# Patient Record
Sex: MALE | Race: WHITE | NOT HISPANIC OR LATINO | URBAN - METROPOLITAN AREA
[De-identification: names, ages, dates, MRNs, and addresses within clinical notes are randomized per-mention and may not be internally consistent; named-entity substitution may affect disease eponyms.]

---

## 2017-05-04 PROBLEM — Z00.00 ENCOUNTER FOR PREVENTIVE HEALTH EXAMINATION: Status: ACTIVE | Noted: 2017-05-04

## 2017-05-14 ENCOUNTER — EMERGENCY (EMERGENCY)
Facility: HOSPITAL | Age: 77
LOS: 0 days | Discharge: HOME | End: 2017-05-14

## 2017-06-28 ENCOUNTER — INPATIENT (INPATIENT)
Facility: HOSPITAL | Age: 77
LOS: 1 days | Discharge: HOME | End: 2017-06-30

## 2017-06-28 DIAGNOSIS — Z98.890 OTHER SPECIFIED POSTPROCEDURAL STATES: ICD-10-CM

## 2017-06-28 DIAGNOSIS — R47.81 SLURRED SPEECH: ICD-10-CM

## 2017-06-28 DIAGNOSIS — R04.0 EPISTAXIS: ICD-10-CM

## 2017-06-28 DIAGNOSIS — Z79.02 LONG TERM (CURRENT) USE OF ANTITHROMBOTICS/ANTIPLATELETS: ICD-10-CM

## 2017-06-28 DIAGNOSIS — I25.10 ATHEROSCLEROTIC HEART DISEASE OF NATIVE CORONARY ARTERY WITHOUT ANGINA PECTORIS: ICD-10-CM

## 2017-06-28 DIAGNOSIS — Z79.82 LONG TERM (CURRENT) USE OF ASPIRIN: ICD-10-CM

## 2017-06-28 DIAGNOSIS — I10 ESSENTIAL (PRIMARY) HYPERTENSION: ICD-10-CM

## 2017-07-10 DIAGNOSIS — N40.0 BENIGN PROSTATIC HYPERPLASIA WITHOUT LOWER URINARY TRACT SYMPTOMS: ICD-10-CM

## 2017-07-10 DIAGNOSIS — I25.10 ATHEROSCLEROTIC HEART DISEASE OF NATIVE CORONARY ARTERY WITHOUT ANGINA PECTORIS: ICD-10-CM

## 2017-07-10 DIAGNOSIS — B19.20 UNSPECIFIED VIRAL HEPATITIS C WITHOUT HEPATIC COMA: ICD-10-CM

## 2017-07-10 DIAGNOSIS — F32.9 MAJOR DEPRESSIVE DISORDER, SINGLE EPISODE, UNSPECIFIED: ICD-10-CM

## 2017-07-10 DIAGNOSIS — I10 ESSENTIAL (PRIMARY) HYPERTENSION: ICD-10-CM

## 2017-07-10 DIAGNOSIS — G45.9 TRANSIENT CEREBRAL ISCHEMIC ATTACK, UNSPECIFIED: ICD-10-CM

## 2017-07-10 DIAGNOSIS — Z87.820 PERSONAL HISTORY OF TRAUMATIC BRAIN INJURY: ICD-10-CM

## 2017-07-10 DIAGNOSIS — I63.9 CEREBRAL INFARCTION, UNSPECIFIED: ICD-10-CM

## 2017-07-11 DIAGNOSIS — R29.704 NIHSS SCORE 4: ICD-10-CM

## 2017-08-11 ENCOUNTER — OUTPATIENT (OUTPATIENT)
Dept: OUTPATIENT SERVICES | Facility: HOSPITAL | Age: 77
LOS: 1 days | Discharge: HOME | End: 2017-08-11

## 2017-08-11 DIAGNOSIS — R47.81 SLURRED SPEECH: ICD-10-CM

## 2017-08-17 ENCOUNTER — INPATIENT (INPATIENT)
Facility: HOSPITAL | Age: 77
LOS: 5 days | Discharge: HOME | End: 2017-08-23
Attending: INTERNAL MEDICINE

## 2017-08-17 DIAGNOSIS — K29.40 CHRONIC ATROPHIC GASTRITIS WITHOUT BLEEDING: ICD-10-CM

## 2017-08-17 DIAGNOSIS — Z86.73 PERSONAL HISTORY OF TRANSIENT ISCHEMIC ATTACK (TIA), AND CEREBRAL INFARCTION WITHOUT RESIDUAL DEFICITS: ICD-10-CM

## 2017-08-17 DIAGNOSIS — R47.81 SLURRED SPEECH: ICD-10-CM

## 2017-08-17 DIAGNOSIS — E78.00 PURE HYPERCHOLESTEROLEMIA, UNSPECIFIED: ICD-10-CM

## 2017-08-17 DIAGNOSIS — R13.10 DYSPHAGIA, UNSPECIFIED: ICD-10-CM

## 2017-08-25 DIAGNOSIS — E78.5 HYPERLIPIDEMIA, UNSPECIFIED: ICD-10-CM

## 2017-08-25 DIAGNOSIS — N40.0 BENIGN PROSTATIC HYPERPLASIA WITHOUT LOWER URINARY TRACT SYMPTOMS: ICD-10-CM

## 2017-08-25 DIAGNOSIS — R55 SYNCOPE AND COLLAPSE: ICD-10-CM

## 2017-08-25 DIAGNOSIS — Z86.19 PERSONAL HISTORY OF OTHER INFECTIOUS AND PARASITIC DISEASES: ICD-10-CM

## 2017-08-25 DIAGNOSIS — I25.10 ATHEROSCLEROTIC HEART DISEASE OF NATIVE CORONARY ARTERY WITHOUT ANGINA PECTORIS: ICD-10-CM

## 2017-08-25 DIAGNOSIS — I10 ESSENTIAL (PRIMARY) HYPERTENSION: ICD-10-CM

## 2017-08-25 DIAGNOSIS — Z87.891 PERSONAL HISTORY OF NICOTINE DEPENDENCE: ICD-10-CM

## 2017-08-25 DIAGNOSIS — G62.9 POLYNEUROPATHY, UNSPECIFIED: ICD-10-CM

## 2017-08-25 DIAGNOSIS — Z85.828 PERSONAL HISTORY OF OTHER MALIGNANT NEOPLASM OF SKIN: ICD-10-CM

## 2017-08-25 DIAGNOSIS — D50.9 IRON DEFICIENCY ANEMIA, UNSPECIFIED: ICD-10-CM

## 2017-08-25 DIAGNOSIS — Z86.73 PERSONAL HISTORY OF TRANSIENT ISCHEMIC ATTACK (TIA), AND CEREBRAL INFARCTION WITHOUT RESIDUAL DEFICITS: ICD-10-CM

## 2017-08-25 DIAGNOSIS — R63.4 ABNORMAL WEIGHT LOSS: ICD-10-CM

## 2017-08-25 DIAGNOSIS — K57.30 DIVERTICULOSIS OF LARGE INTESTINE WITHOUT PERFORATION OR ABSCESS WITHOUT BLEEDING: ICD-10-CM

## 2017-09-12 ENCOUNTER — OUTPATIENT (OUTPATIENT)
Dept: OUTPATIENT SERVICES | Facility: HOSPITAL | Age: 77
LOS: 1 days | Discharge: HOME | End: 2017-09-12

## 2017-09-12 DIAGNOSIS — R47.81 SLURRED SPEECH: ICD-10-CM

## 2017-09-18 ENCOUNTER — OUTPATIENT (OUTPATIENT)
Dept: OUTPATIENT SERVICES | Facility: HOSPITAL | Age: 77
LOS: 1 days | Discharge: HOME | End: 2017-09-18

## 2017-09-18 DIAGNOSIS — R47.81 SLURRED SPEECH: ICD-10-CM

## 2017-09-20 DIAGNOSIS — D64.9 ANEMIA, UNSPECIFIED: ICD-10-CM

## 2017-09-20 DIAGNOSIS — R19.5 OTHER FECAL ABNORMALITIES: ICD-10-CM

## 2017-09-25 DIAGNOSIS — E78.5 HYPERLIPIDEMIA, UNSPECIFIED: ICD-10-CM

## 2017-09-25 DIAGNOSIS — I10 ESSENTIAL (PRIMARY) HYPERTENSION: ICD-10-CM

## 2017-09-25 DIAGNOSIS — R55 SYNCOPE AND COLLAPSE: ICD-10-CM

## 2017-09-25 DIAGNOSIS — I25.10 ATHEROSCLEROTIC HEART DISEASE OF NATIVE CORONARY ARTERY WITHOUT ANGINA PECTORIS: ICD-10-CM

## 2017-10-12 ENCOUNTER — APPOINTMENT (OUTPATIENT)
Dept: CARDIOLOGY | Facility: CLINIC | Age: 77
End: 2017-10-12

## 2017-10-12 VITALS — BODY MASS INDEX: 25.76 KG/M2 | HEIGHT: 71 IN | WEIGHT: 184 LBS

## 2017-10-12 VITALS — DIASTOLIC BLOOD PRESSURE: 62 MMHG | SYSTOLIC BLOOD PRESSURE: 104 MMHG

## 2017-10-12 DIAGNOSIS — Z86.39 PERSONAL HISTORY OF OTHER ENDOCRINE, NUTRITIONAL AND METABOLIC DISEASE: ICD-10-CM

## 2017-10-12 DIAGNOSIS — Z86.19 PERSONAL HISTORY OF OTHER INFECTIOUS AND PARASITIC DISEASES: ICD-10-CM

## 2018-06-18 ENCOUNTER — MOBILE ON CALL (OUTPATIENT)
Age: 78
End: 2018-06-18

## 2018-08-09 RX ORDER — FLUDROCORTISONE ACETATE 0.1 MG/1
0.1 TABLET ORAL DAILY
Refills: 0 | Status: ACTIVE | COMMUNITY

## 2018-08-09 RX ORDER — CLOPIDOGREL 75 MG/1
75 TABLET, FILM COATED ORAL
Refills: 0 | Status: DISCONTINUED | COMMUNITY

## 2018-09-17 ENCOUNTER — OUTPATIENT (OUTPATIENT)
Dept: OUTPATIENT SERVICES | Facility: HOSPITAL | Age: 78
LOS: 1 days | Discharge: HOME | End: 2018-09-17

## 2018-09-17 VITALS
SYSTOLIC BLOOD PRESSURE: 148 MMHG | OXYGEN SATURATION: 95 % | WEIGHT: 184.31 LBS | HEIGHT: 71 IN | TEMPERATURE: 98 F | HEART RATE: 69 BPM | DIASTOLIC BLOOD PRESSURE: 81 MMHG | RESPIRATION RATE: 18 BRPM

## 2018-09-17 NOTE — H&P CARDIOLOGY - FAMILY HISTORY
Mother  Still living? Unknown  Family history of breast cancer in female, Age at diagnosis: Age Unknown     Father  Still living? No  Family history of throat cancer, Age at diagnosis: Age Unknown

## 2018-09-17 NOTE — H&P CARDIOLOGY - PMH
Benign prostate hyperplasia    Depression    Gastroesophageal reflux disease, esophagitis presence not specified    Hepatitis C virus infection cured after antiviral drug therapy    Hypercholesteremia    Other depression    Skin cancer  left ear  Spleen injury

## 2018-09-17 NOTE — H&P CARDIOLOGY - HISTORY OF PRESENT ILLNESS
HPI   78 Yr old male with PMH of GERD, BPH, HEPATITIS C, SPLENECTOMY, LEFT EAR CA, DEPRESSION, HLD  presents for removal of loop recorder.  Pt states loop recorder was inserted one year ago for episodes dizziness with periods of near syncope.  Pt admits to diaphoresis with these events. Pt denied any cp or sob and no n/v with these events.  Reports since being placed on loop recorder was found to have orthostatic hypotension with improved symptoms since he now compensates by rising slowly with position change.

## 2018-09-17 NOTE — CHART NOTE - NSCHARTNOTEFT_GEN_A_CORE
Cardiac Electrophysiology Procedure Note    Procedure:  [employer]  Implantable Loop Recorder Explant    Indication: Palpitations  Attending:	Jl Santacruz MD    EQUIPMENT EXPLANTED      [employer] Linq  Serial Number  [id number]        DESCRIPTION OF PROCEDURE  The patient was brought to the Procedure Room in a nonsedated and fasting state, having received preoperative antibiotics. Informed, written consent was obtained prior to the procedure.  The left shoulder region was cleaned and prepped with serial applications of Chlorhexidine. Patient was then covered with sterile drapes in the usual manner. Blood pressure, oxygenation and level of comfort were stable throughout.   	The left deltopectoral groove region was defined; 10 cc of lidocaine solution were infiltrated into the skin overlying the left deltopectoral groove. A 5 mm. incision was then made. The loop recorder was removed from under the skin..     The wound margins approximated well, without any tension or overlap. The wound was closed using dermabond  A dry, sterile dressing was placed over this.     COMPLICATIONS:  The patient tolerated the procedure well. There were no immediate complications.    CONCLUSIONS:  Successful explant of loop recorder.

## 2018-09-17 NOTE — PROGRESS NOTE ADULT - SUBJECTIVE AND OBJECTIVE BOX
Electrophysiology Brief Post-Op Note    I have personally seen and examined the patient.  I agree with the history and physical which I have reviewed and noted any changes below.  09-17-18 @ 13:08    PRE-OP DIAGNOSIS: palpitations    POST-OP DIAGNOSIS: palpitations    PROCEDURE: loop explant    Physician: Noam  Assistant: None    ESTIMATED BLOOD LOSS: 1      mL    ANESTHESIA TYPE:  [  ]General Anesthesia  [  ] Sedation  [ X ] Local/Regional    CONDITION  [  ] Critical  [  ] Serious  [  ]Fair  [ X} good      SPECIMENS REMOVED (IF APPLICABLE):  loop    IMPLANTS (IF APPLICABLE)  none    FINDINGS  PLAN OF CARE  FU 3-4 weeks

## 2018-09-20 DIAGNOSIS — F32.9 MAJOR DEPRESSIVE DISORDER, SINGLE EPISODE, UNSPECIFIED: ICD-10-CM

## 2018-09-20 DIAGNOSIS — Z45.09 ENCOUNTER FOR ADJUSTMENT AND MANAGEMENT OF OTHER CARDIAC DEVICE: ICD-10-CM

## 2018-09-20 DIAGNOSIS — Z87.891 PERSONAL HISTORY OF NICOTINE DEPENDENCE: ICD-10-CM

## 2018-09-20 DIAGNOSIS — E78.4 OTHER HYPERLIPIDEMIA: ICD-10-CM

## 2018-09-20 DIAGNOSIS — B19.20 UNSPECIFIED VIRAL HEPATITIS C WITHOUT HEPATIC COMA: ICD-10-CM

## 2019-06-18 ENCOUNTER — INPATIENT (INPATIENT)
Facility: HOSPITAL | Age: 79
LOS: 1 days | Discharge: HOME | End: 2019-06-20
Attending: INTERNAL MEDICINE | Admitting: INTERNAL MEDICINE
Payer: MEDICARE

## 2019-06-18 VITALS
HEART RATE: 70 BPM | SYSTOLIC BLOOD PRESSURE: 186 MMHG | RESPIRATION RATE: 18 BRPM | OXYGEN SATURATION: 97 % | DIASTOLIC BLOOD PRESSURE: 94 MMHG

## 2019-06-18 PROBLEM — Z86.19 PERSONAL HISTORY OF OTHER INFECTIOUS AND PARASITIC DISEASES: Chronic | Status: ACTIVE | Noted: 2018-09-17

## 2019-06-18 PROBLEM — E78.00 PURE HYPERCHOLESTEROLEMIA, UNSPECIFIED: Chronic | Status: ACTIVE | Noted: 2018-09-17

## 2019-06-18 PROBLEM — K21.9 GASTRO-ESOPHAGEAL REFLUX DISEASE WITHOUT ESOPHAGITIS: Chronic | Status: ACTIVE | Noted: 2018-09-17

## 2019-06-18 PROBLEM — F32.89 OTHER SPECIFIED DEPRESSIVE EPISODES: Chronic | Status: ACTIVE | Noted: 2018-09-17

## 2019-06-18 PROBLEM — N40.0 BENIGN PROSTATIC HYPERPLASIA WITHOUT LOWER URINARY TRACT SYMPTOMS: Chronic | Status: ACTIVE | Noted: 2018-09-17

## 2019-06-18 PROBLEM — C44.90 UNSPECIFIED MALIGNANT NEOPLASM OF SKIN, UNSPECIFIED: Chronic | Status: ACTIVE | Noted: 2018-09-17

## 2019-06-18 PROBLEM — S36.00XA: Chronic | Status: ACTIVE | Noted: 2018-09-17

## 2019-06-18 PROBLEM — F32.9 MAJOR DEPRESSIVE DISORDER, SINGLE EPISODE, UNSPECIFIED: Chronic | Status: ACTIVE | Noted: 2018-09-17

## 2019-06-18 LAB
ALBUMIN SERPL ELPH-MCNC: 3.8 G/DL — SIGNIFICANT CHANGE UP (ref 3.5–5.2)
ALP SERPL-CCNC: 96 U/L — SIGNIFICANT CHANGE UP (ref 30–115)
ALT FLD-CCNC: 15 U/L — SIGNIFICANT CHANGE UP (ref 0–41)
ANION GAP SERPL CALC-SCNC: 7 MMOL/L — SIGNIFICANT CHANGE UP (ref 7–14)
APTT BLD: 38.1 SEC — SIGNIFICANT CHANGE UP (ref 27–39.2)
AST SERPL-CCNC: 21 U/L — SIGNIFICANT CHANGE UP (ref 0–41)
BASOPHILS # BLD AUTO: 0.05 K/UL — SIGNIFICANT CHANGE UP (ref 0–0.2)
BASOPHILS NFR BLD AUTO: 0.7 % — SIGNIFICANT CHANGE UP (ref 0–1)
BILIRUB SERPL-MCNC: 0.8 MG/DL — SIGNIFICANT CHANGE UP (ref 0.2–1.2)
BUN SERPL-MCNC: 11 MG/DL — SIGNIFICANT CHANGE UP (ref 10–20)
CALCIUM SERPL-MCNC: 8.9 MG/DL — SIGNIFICANT CHANGE UP (ref 8.5–10.1)
CHLORIDE SERPL-SCNC: 102 MMOL/L — SIGNIFICANT CHANGE UP (ref 98–110)
CK MB CFR SERPL CALC: 2.5 NG/ML — SIGNIFICANT CHANGE UP (ref 0.6–6.3)
CK MB CFR SERPL CALC: 2.7 NG/ML — SIGNIFICANT CHANGE UP (ref 0.6–6.3)
CK SERPL-CCNC: 92 U/L — SIGNIFICANT CHANGE UP (ref 0–225)
CO2 SERPL-SCNC: 32 MMOL/L — SIGNIFICANT CHANGE UP (ref 17–32)
CREAT SERPL-MCNC: 0.8 MG/DL — SIGNIFICANT CHANGE UP (ref 0.7–1.5)
EOSINOPHIL # BLD AUTO: 0.19 K/UL — SIGNIFICANT CHANGE UP (ref 0–0.7)
EOSINOPHIL NFR BLD AUTO: 2.7 % — SIGNIFICANT CHANGE UP (ref 0–8)
GLUCOSE BLDC GLUCOMTR-MCNC: 109 MG/DL — HIGH (ref 70–99)
GLUCOSE BLDC GLUCOMTR-MCNC: 111 MG/DL — HIGH (ref 70–99)
GLUCOSE SERPL-MCNC: 94 MG/DL — SIGNIFICANT CHANGE UP (ref 70–99)
HCT VFR BLD CALC: 42.9 % — SIGNIFICANT CHANGE UP (ref 42–52)
HGB BLD-MCNC: 14.2 G/DL — SIGNIFICANT CHANGE UP (ref 14–18)
IMM GRANULOCYTES NFR BLD AUTO: 0.3 % — SIGNIFICANT CHANGE UP (ref 0.1–0.3)
INR BLD: 1.14 RATIO — SIGNIFICANT CHANGE UP (ref 0.65–1.3)
LYMPHOCYTES # BLD AUTO: 2.41 K/UL — SIGNIFICANT CHANGE UP (ref 1.2–3.4)
LYMPHOCYTES # BLD AUTO: 34.5 % — SIGNIFICANT CHANGE UP (ref 20.5–51.1)
MCHC RBC-ENTMCNC: 32 PG — HIGH (ref 27–31)
MCHC RBC-ENTMCNC: 33.1 G/DL — SIGNIFICANT CHANGE UP (ref 32–37)
MCV RBC AUTO: 96.6 FL — HIGH (ref 80–94)
MONOCYTES # BLD AUTO: 0.79 K/UL — HIGH (ref 0.1–0.6)
MONOCYTES NFR BLD AUTO: 11.3 % — HIGH (ref 1.7–9.3)
NEUTROPHILS # BLD AUTO: 3.52 K/UL — SIGNIFICANT CHANGE UP (ref 1.4–6.5)
NEUTROPHILS NFR BLD AUTO: 50.5 % — SIGNIFICANT CHANGE UP (ref 42.2–75.2)
NRBC # BLD: 0 /100 WBCS — SIGNIFICANT CHANGE UP (ref 0–0)
PLATELET # BLD AUTO: 209 K/UL — SIGNIFICANT CHANGE UP (ref 130–400)
POTASSIUM SERPL-MCNC: 3.3 MMOL/L — LOW (ref 3.5–5)
POTASSIUM SERPL-SCNC: 3.3 MMOL/L — LOW (ref 3.5–5)
PROT SERPL-MCNC: 6.8 G/DL — SIGNIFICANT CHANGE UP (ref 6–8)
PROTHROM AB SERPL-ACNC: 13.1 SEC — HIGH (ref 9.95–12.87)
RBC # BLD: 4.44 M/UL — LOW (ref 4.7–6.1)
RBC # FLD: 14.2 % — SIGNIFICANT CHANGE UP (ref 11.5–14.5)
SODIUM SERPL-SCNC: 141 MMOL/L — SIGNIFICANT CHANGE UP (ref 135–146)
TROPONIN T SERPL-MCNC: <0.01 NG/ML — SIGNIFICANT CHANGE UP
TROPONIN T SERPL-MCNC: <0.01 NG/ML — SIGNIFICANT CHANGE UP
WBC # BLD: 6.98 K/UL — SIGNIFICANT CHANGE UP (ref 4.8–10.8)
WBC # FLD AUTO: 6.98 K/UL — SIGNIFICANT CHANGE UP (ref 4.8–10.8)

## 2019-06-18 PROCEDURE — 93010 ELECTROCARDIOGRAM REPORT: CPT

## 2019-06-18 PROCEDURE — 70450 CT HEAD/BRAIN W/O DYE: CPT | Mod: 26

## 2019-06-18 PROCEDURE — 0042T: CPT

## 2019-06-18 PROCEDURE — 93010 ELECTROCARDIOGRAM REPORT: CPT | Mod: 77

## 2019-06-18 PROCEDURE — 71045 X-RAY EXAM CHEST 1 VIEW: CPT | Mod: 26

## 2019-06-18 PROCEDURE — 99221 1ST HOSP IP/OBS SF/LOW 40: CPT

## 2019-06-18 PROCEDURE — 99291 CRITICAL CARE FIRST HOUR: CPT | Mod: GC

## 2019-06-18 RX ORDER — PREGABALIN 225 MG/1
1000 CAPSULE ORAL DAILY
Refills: 0 | Status: DISCONTINUED | OUTPATIENT
Start: 2019-06-18 | End: 2019-06-20

## 2019-06-18 RX ORDER — FERROUS SULFATE 325(65) MG
325 TABLET ORAL DAILY
Refills: 0 | Status: DISCONTINUED | OUTPATIENT
Start: 2019-06-18 | End: 2019-06-20

## 2019-06-18 RX ORDER — PANTOPRAZOLE SODIUM 20 MG/1
1 TABLET, DELAYED RELEASE ORAL
Qty: 0 | Refills: 0 | DISCHARGE

## 2019-06-18 RX ORDER — PREGABALIN 225 MG/1
1 CAPSULE ORAL
Qty: 0 | Refills: 0 | DISCHARGE

## 2019-06-18 RX ORDER — TAMSULOSIN HYDROCHLORIDE 0.4 MG/1
0.4 CAPSULE ORAL AT BEDTIME
Refills: 0 | Status: DISCONTINUED | OUTPATIENT
Start: 2019-06-18 | End: 2019-06-20

## 2019-06-18 RX ORDER — CHLORHEXIDINE GLUCONATE 213 G/1000ML
1 SOLUTION TOPICAL ONCE
Refills: 0 | Status: COMPLETED | OUTPATIENT
Start: 2019-06-18 | End: 2019-06-18

## 2019-06-18 RX ORDER — ATORVASTATIN CALCIUM 80 MG/1
80 TABLET, FILM COATED ORAL AT BEDTIME
Refills: 0 | Status: DISCONTINUED | OUTPATIENT
Start: 2019-06-18 | End: 2019-06-20

## 2019-06-18 RX ORDER — ASPIRIN/CALCIUM CARB/MAGNESIUM 324 MG
81 TABLET ORAL DAILY
Refills: 0 | Status: DISCONTINUED | OUTPATIENT
Start: 2019-06-18 | End: 2019-06-20

## 2019-06-18 RX ORDER — FLUDROCORTISONE ACETATE 0.1 MG/1
0.1 TABLET ORAL DAILY
Refills: 0 | Status: DISCONTINUED | OUTPATIENT
Start: 2019-06-18 | End: 2019-06-20

## 2019-06-18 RX ORDER — ASPIRIN/CALCIUM CARB/MAGNESIUM 324 MG
325 TABLET ORAL ONCE
Refills: 0 | Status: COMPLETED | OUTPATIENT
Start: 2019-06-18 | End: 2019-06-18

## 2019-06-18 RX ORDER — CHLORHEXIDINE GLUCONATE 213 G/1000ML
1 SOLUTION TOPICAL
Refills: 0 | Status: DISCONTINUED | OUTPATIENT
Start: 2019-06-18 | End: 2019-06-20

## 2019-06-18 RX ORDER — PANTOPRAZOLE SODIUM 20 MG/1
40 TABLET, DELAYED RELEASE ORAL
Refills: 0 | Status: DISCONTINUED | OUTPATIENT
Start: 2019-06-18 | End: 2019-06-20

## 2019-06-18 RX ORDER — ENOXAPARIN SODIUM 100 MG/ML
40 INJECTION SUBCUTANEOUS DAILY
Refills: 0 | Status: DISCONTINUED | OUTPATIENT
Start: 2019-06-18 | End: 2019-06-20

## 2019-06-18 RX ORDER — ESCITALOPRAM OXALATE 10 MG/1
20 TABLET, FILM COATED ORAL DAILY
Refills: 0 | Status: DISCONTINUED | OUTPATIENT
Start: 2019-06-18 | End: 2019-06-20

## 2019-06-18 RX ORDER — ATORVASTATIN CALCIUM 80 MG/1
1 TABLET, FILM COATED ORAL
Qty: 0 | Refills: 0 | DISCHARGE

## 2019-06-18 RX ORDER — POTASSIUM CHLORIDE 20 MEQ
40 PACKET (EA) ORAL ONCE
Refills: 0 | Status: COMPLETED | OUTPATIENT
Start: 2019-06-18 | End: 2019-06-18

## 2019-06-18 RX ORDER — TAMSULOSIN HYDROCHLORIDE 0.4 MG/1
1 CAPSULE ORAL
Qty: 0 | Refills: 0 | DISCHARGE

## 2019-06-18 RX ADMIN — TAMSULOSIN HYDROCHLORIDE 0.4 MILLIGRAM(S): 0.4 CAPSULE ORAL at 22:03

## 2019-06-18 RX ADMIN — Medication 325 MILLIGRAM(S): at 16:27

## 2019-06-18 RX ADMIN — Medication 40 MILLIEQUIVALENT(S): at 16:27

## 2019-06-18 RX ADMIN — ENOXAPARIN SODIUM 40 MILLIGRAM(S): 100 INJECTION SUBCUTANEOUS at 17:25

## 2019-06-18 NOTE — H&P ADULT - NSHPLABSRESULTS_GEN_ALL_CORE
14.2   6.98  )-----------( 209      ( 18 Jun 2019 10:55 )             42.9       06-18    141  |  102  |  11  ----------------------------<  94  3.3<L>   |  32  |  0.8    Ca    8.9      18 Jun 2019 10:55    TPro  6.8  /  Alb  3.8  /  TBili  0.8  /  DBili  x   /  AST  21  /  ALT  15  /  AlkPhos  96  06-18        PT/INR - ( 18 Jun 2019 10:55 )   PT: 13.10 sec;   INR: 1.14 ratio         PTT - ( 18 Jun 2019 10:55 )  PTT:38.1 sec      CARDIAC MARKERS ( 18 Jun 2019 10:55 )  x     / <0.01 ng/mL / 92 U/L / x     / 2.5 ng/mL        08 (18 Jun 2019 11:16)      POCT Blood Glucose.: 108 mg/dL (18 Jun 2019 10:46)

## 2019-06-18 NOTE — CONSULT NOTE ADULT - ASSESSMENT
Impression:  79 y/o  with PMH TIA, BPH, Hyperlipidemia, TBI, skin cancer presents with left arm weakness, numbness, dysarthria and left facial droop which began yesterday at 12PM.    Plan:  No TPA as LKW >4.5 hours  No IA intervention as no LVO present  Continue Plavix   mg x1 then 81 mg QD  Lipitor 80 mg QD  MRI brain without bang  2d echo w bubble study  check lipid panel, a1c, b12/folate  PT/OT/Speech eval  admit to stroke unit Impression:  77 y/o  with PMH TIA, BPH, Hyperlipidemia, TBI, skin cancer presents with left arm weakness, numbness, dysarthria and left facial droop which began yesterday at 12PM.    Plan:  No TPA as LKW >4.5 hours  No IA intervention as no LVO present  Continue Plavix   mg x1 then 81 mg QD  Lipitor 80 mg QD  MRI brain without bang  2d echo w bubble study  check lipid panel, a1c, b12/folate  PT/OT/Speech eval  admit to stroke unit and neurochecks and vitals q4 hours  Keep -200

## 2019-06-18 NOTE — H&P ADULT - ASSESSMENT
77 y/o with PMHx of TIA, BPH, Hyperlipidemia, TBI, skin cancer s/p ear sx and RT on remission , hepatitis C (treated)   presents with Left sided weakness and numbness that started around 12 pm yesterday. Patient was a stroke code in ED, with NIHSS of 6, CThead  was performed in ED which was unremarkable for acute changes. Patient is being admitted under stroke unit for further work up.    #Left sided weakness/numbness : R/o stroke   CT head : unremarkable  CTA :  Short 0.7 cm segment of mild (50-59%) stenosis of the proximal left   internal carotid artery proximally 1.0 cm beyond the carotid bifurcation. Persistent fetal origin of the right PCA off the right internal carotid artery with small/hypoplastic P1 segment of the right PCA.otherwise unremarkable  seen by neurology  Recommended :  mg x1 then 81 mg QD  Lipitor 80 mg QD  MRI brain without bang  2d echo w bubble study  check lipid panel, a1c, b12/folate  PT/OT/Speech eval  neurochecks and vitals q4 hours  Keep -200    #BPH: c/w flomax    #depression : c/w lexapro    #CHG 4% bath daily and prn  DVT PPX: lovenox  DISPO:from home  DIET: dash diet  Activity: as tolerated   PT/OT

## 2019-06-18 NOTE — ED PROVIDER NOTE - CLINICAL SUMMARY MEDICAL DECISION MAKING FREE TEXT BOX
Pw dysarthria, left sided weakness, at first thought to be waxing and waning but appears to be continuous since yesterday. Stroke code called. Neuro recs followed. admitted

## 2019-06-18 NOTE — CONSULT NOTE ADULT - SUBJECTIVE AND OBJECTIVE BOX
HPI:  77 y/o  with PMH TIA, BPH, Hyperlipidemia, TBI, skin cancer presents with left arm weakness, numbness, dysarthria and left facial droop which began yesterday at 12PM.    Home Medications:  Aspirin Low Dose 81 mg oral delayed release tablet: 1 tab(s) orally once a day (17 Sep 2018 15:17)  Cobal-1000 injectable solution: 1  injectable every 30 days (17 Sep 2018 15:22)  cyanocobalamin 1000 mcg oral tablet, extended release: 1 tab(s) orally once a day (17 Sep 2018 15:23)  ferrous sulfate 324 mg (65 mg elemental iron) oral tablet: 1 tab(s) orally (17 Sep 2018 15:20)  Flomax 0.4 mg oral capsule: 1 cap(s) orally once a day (17 Sep 2018 15:17)  Lexapro 20 mg oral tablet: 1 tab(s) orally once a day (17 Sep 2018 15:17)  Lipitor 80 mg oral tablet: 1 tab(s) orally once a day (17 Sep 2018 15:17)  Protonix 40 mg oral granule, delayed release: 1 each orally once a day (17 Sep 2018 15:17)    Allergies    No Known Allergies    Intolerances      MEDICATIONS  (STANDING):    MEDICATIONS  (PRN):      LABS:                        14.2   6.98  )-----------( 209      ( 18 Jun 2019 10:55 )             42.9       NIH Stroke Scale:   · NIH Stroke Scale: LOC	(0) Alert; keenly responsive	  · NIH Stroke Scale: LOC Question	(1) Answers one question correctly	  · NIH Stroke Scale: LOC Command	(0) Performs both tasks correctly	  · NIH Stroke Scale: Gaze	(0) Normal	  · NIH Stroke Scale: Visual	(0) No visual loss	  · NIH Stroke Scale: Facial	(1) Minor paralysis (flattened nasolabial fold, asymmetry on smiling)	  · NIH Stroke Scale: Arm Left	(1) Drift; limb holds 90 (or 45) degrees, but drifts down before full 10 seconds; does not hit bed or other support	  · NIH Stroke Scale: Arm Right	(0) No drift; limb holds 90 (or 45) degrees for full 10 secs	  · NIH Stroke Scale: Leg Left	(0) No drift; leg holds 30 degree position for full 5 secs	  · NIH Stroke Scale: Leg Right	(0) No drift; leg holds 30 degree position for full 5 secs	  · NIH Stroke Scale: Ataxia	(1) Present in one limb	  · NIH Stroke Scale: Sensory	(1) Mild-to-moderate sensory loss; patient feels pinprick is less sharp or is dull on the affected side; or there is a loss of superficial pain with pinprick, but patient is aware of being touched	  · NIH Stroke Scale: Language	(0) No aphasia; normal	  · NIH Stroke Scale: Dysarthria	(1) Mild-to-moderate dysarthria; patient slurs at least some words and, at worst, can be understood with some difficulty	  · NIH Stroke Scale: Extinct Inattention	(0) No abnormality	  · NIH Stroke Scale: Total	6	    MRS: 0    < from: CT Brain Stroke Protocol (06.18.19 @ 11:05) >  Findings: The ventricles, basal cisterns and sulcal pattern are slightly   prominent consistent with parenchymal volume loss. There are scattered   patchy and punctate foci of hypodensities in the periventricular and   subcortical white matter which are nonspecific and without mass effect   most likely consistent with chronic small vessel ischemic changes in a   patient of this stated age.    Small round hypodensities are again noted in the right head of the   caudate and right basal ganglia, bilateral subinsular regions and left   cerebellum consistent with areas of old lacunar infarcts. There is no   acute mass effect, midline shift or hemorrhage. No extra-axial fluid   collections are identified.    The bones of the calvarium are intact. There is partial opacification   left mastoid air complex. The paranasal sinuses, right mastoid complex   and globes and orbits are grossly unremarkable. The lenses are thin and   may have been replaced.    Vascular calcifications are noted in the bilateral carotid arteries   consistent with atherosclerotic changes. Metallic surgical clip is noted   within the region of the left parotid gland.    Beam hardening artifact is noted overlying the brain stem and posterior   fossa which is inherent to CT in this location.      IMPRESSION:     1.  Periventricular and subcortical white matter chronic small vessel   ischemic changes and multiple lacunar infarcts as described above.    2.  No acute mass effect, midline shift or hemorrhage.      3.  If the patient continues to be symptomatic follow-up CT scan and/or   MRI of the brain may be helpful for further evaluation.    < end of copied text >

## 2019-06-18 NOTE — ED PROVIDER NOTE - PROGRESS NOTE DETAILS
ATTENDING NOTE:   79 y/o M with PMH of TBI, TIA, BPH, HLD, skin CA, presents to ED with onset of left sided weakness starting at 12PM yesterday. Wife believed this was waxing/waning, however pt now states SX have been continuous. No fever/chills, CP, SOB, abdominal pain, N/V/D.     Exam: NAD, NCAT, HEENT: Pt with post-surgical changes of face s/p injury years ago, mmm, EOMI, PERRLA, Neck: supple, nontender, nl ROM, Heart: RRR, no murmur, Lungs: BCTA, no signs of increased WOB, Abd: NTND, no guarding or rebound, no hernia palpated, no CVAT. MSK: chest, back, and ext nontender, nl rom, no deformity. Neuro: A&Ox3, CN II-XII intact, 5/5 strength of right upper and lower extremities, left lower extremity. (+)Left hand weakness with some drift of LUE noted. nl sensation throughout, normal speech, gait, and coordination.  A/P: Stroke code called on arrival. Dispo per neuro recs. Assess for metabolic or other cause of SX.

## 2019-06-18 NOTE — H&P ADULT - ATTENDING COMMENTS
Patient seen and examined independently. I agree with the resident's note, physical exam, and plan except as below.  Vital Signs Last 24 Hrs  T(C): 35.8 (19 Jun 2019 11:15), Max: 37.3 (18 Jun 2019 20:00)  T(F): 96.5 (19 Jun 2019 11:15), Max: 99.1 (18 Jun 2019 20:00)  HR: 63 (19 Jun 2019 11:15) (58 - 71)  BP: 143/75 (19 Jun 2019 05:42) (143/75 - 196/109)  BP(mean): 109 (18 Jun 2019 19:00) (109 - 142)  RR: 16 (19 Jun 2019 05:42) (11 - 19)  SpO2: 97% (18 Jun 2019 21:05) (97% - 99%)  PE  nad  aaox3  LEft ear removed  [id number]  ctabl  soft ntnd+bs  no ccw  nonfocal , CN intact    #LEft sided numbness/weakness - now resolved  TIA - rule out CVA  cont asa/lipitor  stroke unit monitoring  follow up MRI and echo with bubble  pt has hx of TIA in past  neuro follow up after mri  pt/rehab    #Bph on flomax - no luts    discussed with resident and rn

## 2019-06-18 NOTE — ED ADULT NURSE NOTE - NSIMPLEMENTINTERV_GEN_ALL_ED
Implemented All Fall with Harm Risk Interventions:  Caret to call system. Call bell, personal items and telephone within reach. Instruct patient to call for assistance. Room bathroom lighting operational. Non-slip footwear when patient is off stretcher. Physically safe environment: no spills, clutter or unnecessary equipment. Stretcher in lowest position, wheels locked, appropriate side rails in place. Provide visual cue, wrist band, yellow gown, etc. Monitor gait and stability. Monitor for mental status changes and reorient to person, place, and time. Review medications for side effects contributing to fall risk. Reinforce activity limits and safety measures with patient and family. Provide visual clues: red socks.

## 2019-06-18 NOTE — ED PROVIDER NOTE - PHYSICAL EXAMINATION
Exam: NAD, NCAT, HEENT: Pt with post-surgical changes of face s/p injury years ago, mmm, EOMI, PERRLA, Neck: supple, nontender, nl ROM, Heart: RRR, no murmur, Lungs: BCTA, no signs of increased WOB, Abd: NTND, no guarding or rebound, no hernia palpated, no CVAT. MSK: chest, back, and ext nontender, nl rom, no deformity. Neuro: A&Ox3, CN II-XII intact, 5/5 strength of right upper and lower extremities, left lower extremity. (+)Left hand weakness with some drift of LUE noted. nl sensation throughout, normal speech, gait, and coordination.

## 2019-06-18 NOTE — H&P ADULT - HISTORY OF PRESENT ILLNESS
77 y/o  with PMH TIA, BPH, Hyperlipidemia, TBI, skin cancer presents with left arm weakness, numbness, dysarthria and left facial droop which began yesterday at 12PM. 77 y/o with PMHx of TIA, BPH, Hyperlipidemia, TBI, skin cancer s/p ear sx and RT on remission , hepatitis C (treated)   presents with Left sided weakness and numbness that started around 12 pm yesterday. He states that he suddenly started feeling weak and had unsteady gait since yesterday afternoon which has now improved but still not at his baseline. Also reports feeling numb on entire left side (both UE and LE). As per wife, his symptoms have been on and off but he states that its continuous since yesterday. Denies any headache/n/v/dizziness/vision change/slurred speech. He has always had difficulty swallowing from radiation therapy that he received for his skin cancer but no new changes now. Denies fever/chills/cehst pain/sob.abd pain.GI or  symptoms. ROS otherwise negative.

## 2019-06-18 NOTE — CONSULT NOTE ADULT - ATTENDING COMMENTS
Patient seen and examined with PA during stroke code.  Patient had symptoms starting last night before bed.  When he woke up he was still not feeling normal so wife brought him to the ED.    Plan as above

## 2019-06-18 NOTE — H&P ADULT - NSICDXFAMILYHX_GEN_ALL_CORE_FT
FAMILY HISTORY:  Father  Still living? No  Family history of throat cancer, Age at diagnosis: Age Unknown    Mother  Still living? Unknown  Family history of breast cancer in female, Age at diagnosis: Age Unknown

## 2019-06-18 NOTE — H&P ADULT - NSICDXPASTMEDICALHX_GEN_ALL_CORE_FT
PAST MEDICAL HISTORY:  Benign prostate hyperplasia     Depression     Gastroesophageal reflux disease, esophagitis presence not specified     Hepatitis C virus infection cured after antiviral drug therapy     Hypercholesteremia     Other depression     Skin cancer left ear    Spleen injury

## 2019-06-18 NOTE — ED PROVIDER NOTE - OBJECTIVE STATEMENT
79 y/o M with PMH of TBI, TIA, BPH, HLD, skin CA, presents to ED with onset of left sided weakness starting at 12PM yesterday. Wife believed this was waxing/waning, however pt now states SX have been continuous. No fever/chills, CP, SOB, abdominal pain, N/V/D.

## 2019-06-18 NOTE — ED ADULT NURSE NOTE - OBJECTIVE STATEMENT
left side weakness and decreased sensation this AM, wife noticed this morning when he got up to walk.

## 2019-06-19 LAB
ANION GAP SERPL CALC-SCNC: 11 MMOL/L — SIGNIFICANT CHANGE UP (ref 7–14)
BASOPHILS # BLD AUTO: 0.06 K/UL — SIGNIFICANT CHANGE UP (ref 0–0.2)
BASOPHILS NFR BLD AUTO: 0.8 % — SIGNIFICANT CHANGE UP (ref 0–1)
BUN SERPL-MCNC: 8 MG/DL — LOW (ref 10–20)
CALCIUM SERPL-MCNC: 8.3 MG/DL — LOW (ref 8.5–10.1)
CHLORIDE SERPL-SCNC: 105 MMOL/L — SIGNIFICANT CHANGE UP (ref 98–110)
CHOLEST SERPL-MCNC: 108 MG/DL — SIGNIFICANT CHANGE UP (ref 100–200)
CO2 SERPL-SCNC: 28 MMOL/L — SIGNIFICANT CHANGE UP (ref 17–32)
CREAT SERPL-MCNC: 0.6 MG/DL — LOW (ref 0.7–1.5)
EOSINOPHIL # BLD AUTO: 0.33 K/UL — SIGNIFICANT CHANGE UP (ref 0–0.7)
EOSINOPHIL NFR BLD AUTO: 4.4 % — SIGNIFICANT CHANGE UP (ref 0–8)
ESTIMATED AVERAGE GLUCOSE: 100 MG/DL — SIGNIFICANT CHANGE UP (ref 68–114)
FOLATE SERPL-MCNC: 8.6 NG/ML — SIGNIFICANT CHANGE UP
GLUCOSE BLDC GLUCOMTR-MCNC: 106 MG/DL — HIGH (ref 70–99)
GLUCOSE BLDC GLUCOMTR-MCNC: 124 MG/DL — HIGH (ref 70–99)
GLUCOSE BLDC GLUCOMTR-MCNC: 88 MG/DL — SIGNIFICANT CHANGE UP (ref 70–99)
GLUCOSE BLDC GLUCOMTR-MCNC: 88 MG/DL — SIGNIFICANT CHANGE UP (ref 70–99)
GLUCOSE SERPL-MCNC: 90 MG/DL — SIGNIFICANT CHANGE UP (ref 70–99)
HBA1C BLD-MCNC: 5.1 % — SIGNIFICANT CHANGE UP (ref 4–5.6)
HCT VFR BLD CALC: 40.6 % — LOW (ref 42–52)
HDLC SERPL-MCNC: 34 MG/DL — LOW
HGB BLD-MCNC: 13.6 G/DL — LOW (ref 14–18)
IMM GRANULOCYTES NFR BLD AUTO: 0.3 % — SIGNIFICANT CHANGE UP (ref 0.1–0.3)
LIPID PNL WITH DIRECT LDL SERPL: 65 MG/DL — SIGNIFICANT CHANGE UP (ref 4–129)
LYMPHOCYTES # BLD AUTO: 2.16 K/UL — SIGNIFICANT CHANGE UP (ref 1.2–3.4)
LYMPHOCYTES # BLD AUTO: 28.8 % — SIGNIFICANT CHANGE UP (ref 20.5–51.1)
MAGNESIUM SERPL-MCNC: 1.9 MG/DL — SIGNIFICANT CHANGE UP (ref 1.8–2.4)
MCHC RBC-ENTMCNC: 32 PG — HIGH (ref 27–31)
MCHC RBC-ENTMCNC: 33.5 G/DL — SIGNIFICANT CHANGE UP (ref 32–37)
MCV RBC AUTO: 95.5 FL — HIGH (ref 80–94)
MONOCYTES # BLD AUTO: 1.02 K/UL — HIGH (ref 0.1–0.6)
MONOCYTES NFR BLD AUTO: 13.6 % — HIGH (ref 1.7–9.3)
NEUTROPHILS # BLD AUTO: 3.91 K/UL — SIGNIFICANT CHANGE UP (ref 1.4–6.5)
NEUTROPHILS NFR BLD AUTO: 52.1 % — SIGNIFICANT CHANGE UP (ref 42.2–75.2)
NRBC # BLD: 0 /100 WBCS — SIGNIFICANT CHANGE UP (ref 0–0)
PLATELET # BLD AUTO: 200 K/UL — SIGNIFICANT CHANGE UP (ref 130–400)
POTASSIUM SERPL-MCNC: 3.2 MMOL/L — LOW (ref 3.5–5)
POTASSIUM SERPL-SCNC: 3.2 MMOL/L — LOW (ref 3.5–5)
RBC # BLD: 4.25 M/UL — LOW (ref 4.7–6.1)
RBC # FLD: 14.1 % — SIGNIFICANT CHANGE UP (ref 11.5–14.5)
SODIUM SERPL-SCNC: 144 MMOL/L — SIGNIFICANT CHANGE UP (ref 135–146)
TOTAL CHOLESTEROL/HDL RATIO MEASUREMENT: 3.2 RATIO — LOW (ref 4–5.5)
TRIGL SERPL-MCNC: 63 MG/DL — SIGNIFICANT CHANGE UP (ref 10–149)
TSH SERPL-MCNC: 4.24 UIU/ML — HIGH (ref 0.27–4.2)
VIT B12 SERPL-MCNC: 1157 PG/ML — SIGNIFICANT CHANGE UP (ref 232–1245)
WBC # BLD: 7.5 K/UL — SIGNIFICANT CHANGE UP (ref 4.8–10.8)
WBC # FLD AUTO: 7.5 K/UL — SIGNIFICANT CHANGE UP (ref 4.8–10.8)

## 2019-06-19 PROCEDURE — 70551 MRI BRAIN STEM W/O DYE: CPT | Mod: 26

## 2019-06-19 PROCEDURE — 99232 SBSQ HOSP IP/OBS MODERATE 35: CPT

## 2019-06-19 PROCEDURE — 93306 TTE W/DOPPLER COMPLETE: CPT | Mod: 26

## 2019-06-19 PROCEDURE — 99223 1ST HOSP IP/OBS HIGH 75: CPT

## 2019-06-19 RX ORDER — LISINOPRIL 2.5 MG/1
2.5 TABLET ORAL DAILY
Refills: 0 | Status: DISCONTINUED | OUTPATIENT
Start: 2019-06-19 | End: 2019-06-20

## 2019-06-19 RX ORDER — CLOPIDOGREL BISULFATE 75 MG/1
75 TABLET, FILM COATED ORAL DAILY
Refills: 0 | Status: DISCONTINUED | OUTPATIENT
Start: 2019-06-19 | End: 2019-06-20

## 2019-06-19 RX ORDER — POTASSIUM CHLORIDE 20 MEQ
40 PACKET (EA) ORAL ONCE
Refills: 0 | Status: COMPLETED | OUTPATIENT
Start: 2019-06-19 | End: 2019-06-19

## 2019-06-19 RX ADMIN — Medication 81 MILLIGRAM(S): at 12:40

## 2019-06-19 RX ADMIN — PREGABALIN 1000 MICROGRAM(S): 225 CAPSULE ORAL at 12:40

## 2019-06-19 RX ADMIN — ENOXAPARIN SODIUM 40 MILLIGRAM(S): 100 INJECTION SUBCUTANEOUS at 12:40

## 2019-06-19 RX ADMIN — CHLORHEXIDINE GLUCONATE 1 APPLICATION(S): 213 SOLUTION TOPICAL at 06:11

## 2019-06-19 RX ADMIN — FLUDROCORTISONE ACETATE 0.1 MILLIGRAM(S): 0.1 TABLET ORAL at 06:11

## 2019-06-19 RX ADMIN — LISINOPRIL 2.5 MILLIGRAM(S): 2.5 TABLET ORAL at 12:42

## 2019-06-19 RX ADMIN — CLOPIDOGREL BISULFATE 75 MILLIGRAM(S): 75 TABLET, FILM COATED ORAL at 12:40

## 2019-06-19 RX ADMIN — Medication 325 MILLIGRAM(S): at 12:40

## 2019-06-19 RX ADMIN — Medication 40 MILLIEQUIVALENT(S): at 12:40

## 2019-06-19 RX ADMIN — ATORVASTATIN CALCIUM 80 MILLIGRAM(S): 80 TABLET, FILM COATED ORAL at 21:45

## 2019-06-19 RX ADMIN — PANTOPRAZOLE SODIUM 40 MILLIGRAM(S): 20 TABLET, DELAYED RELEASE ORAL at 06:11

## 2019-06-19 RX ADMIN — ESCITALOPRAM OXALATE 20 MILLIGRAM(S): 10 TABLET, FILM COATED ORAL at 12:40

## 2019-06-19 RX ADMIN — TAMSULOSIN HYDROCHLORIDE 0.4 MILLIGRAM(S): 0.4 CAPSULE ORAL at 21:45

## 2019-06-19 NOTE — PROGRESS NOTE ADULT - ASSESSMENT
77 y/o  with PMH TIA, BPH, Hyperlipidemia, TBI, skin cancer presents with left arm weakness, numbness, dysarthria and left facial droop which has been gradually resolving.  NIHSS is 3 on this exam, no acute events.    Plan:  MRI brain NC  Echo with bubble  Cont aspirin 81 MG daily  Continue Lipitor 80 mg q daily  PT/REHAB 77 y/o  with PMH TIA, BPH, Hyperlipidemia, TBI, skin cancer presents with left arm weakness, numbness, dysarthria and left facial droop which has been gradually resolving.  NIHSS is 3 on this exam, no acute events.    Plan:  continue stroke unit with q 4 hr neuro checks  MRI brain NC  Echo with bubble  Cont aspirin 81 MG daily  Continue Lipitor 80 mg q daily  PT/REHAB 77 y/o  with PMH TIA, BPH, Hyperlipidemia, TBI, skin cancer presents with left arm weakness, numbness, dysarthria and left facial droop which has been gradually resolving.  NIHSS is 3 on this exam, no acute events.    Plan:  continue stroke unit with q 4 hr neuro checks  MRI brain NC  Echo with bubble  Cont aspirin 81 MG daily  Lower lipitor to 40mg QD  PT/OT possible dc today to home with services vs rehab

## 2019-06-19 NOTE — PROGRESS NOTE ADULT - ASSESSMENT
77 y/o  with PMH TIA, BPH, Hyperlipidemia, TBI, skin cancer presents with left arm weakness, numbness, dysarthria and left facial droop which began yesterday and has been gradually resolving. CTH and CTP are unremarkable. NIHSS is 2.    Plan:  MRI brain NC  TTE  Cont aspirin, statin  PT      Neuroattending note will follow

## 2019-06-19 NOTE — SWALLOW BEDSIDE ASSESSMENT ADULT - SLP PERTINENT HISTORY OF CURRENT PROBLEM
Pt presented w/ L sided weakness and numbness; PMHx: TIA, BPH, TBI, HLD, skin CA s/p ear sx and RT in remission, GERD, hepatitis C; CTH (-), CXR (-)

## 2019-06-19 NOTE — PROGRESS NOTE ADULT - ATTENDING COMMENTS
Patient seen and examined with PA.  Plan as above
Patient seen and examined and agree with above except as noted.  Patient MRI brain reviewed and patient exam is similar to yesterday.  Findings of slight drift in LUE and clumsiness in LUE.  No drift in Leg and HTS is normal  Slight left facial  NIHSS 3    Plan as above

## 2019-06-19 NOTE — PROGRESS NOTE ADULT - ASSESSMENT
SUBJECTIVE:    Patient is a 78y old  Male who presents with a chief complaint of Left arm weakness (18 Jun 2019 13:58)    Currently admitted to medicine with the primary diagnosis of Unilateral weakness     Today is hospital day 1d. This morning he is resting comfortably in bed and reports no new issues or overnight events. L sided weakness resolved.    PAST MEDICAL & SURGICAL HISTORY  PAST MEDICAL & SURGICAL HISTORY:  Depression  Spleen injury  Benign prostate hyperplasia  Skin cancer: left ear  Hepatitis C virus infection cured after antiviral drug therapy  Other depression  Gastroesophageal reflux disease, esophagitis presence not specified  Hypercholesteremia      ALLERGIES:  No Known Allergies    MEDICATIONS:  STANDING MEDICATIONS  aspirin enteric coated 81 milliGRAM(s) Oral daily  atorvastatin 80 milliGRAM(s) Oral at bedtime  chlorhexidine 4% Liquid 1 Application(s) Topical <User Schedule>  clopidogrel Tablet 75 milliGRAM(s) Oral daily  cyanocobalamin 1000 MICROGram(s) Oral daily  enoxaparin Injectable 40 milliGRAM(s) SubCutaneous daily  escitalopram 20 milliGRAM(s) Oral daily  ferrous    sulfate 325 milliGRAM(s) Oral daily  fludroCORTISONE 0.1 milliGRAM(s) Oral daily  pantoprazole    Tablet 40 milliGRAM(s) Oral before breakfast  potassium chloride    Tablet ER 40 milliEquivalent(s) Oral once  tamsulosin 0.4 milliGRAM(s) Oral at bedtime    PRN MEDICATIONS    VITALS:   T(F): 97  HR: 62  BP: 143/75  RR: 16  SpO2: 97%    LABS:                        13.6   7.50  )-----------( 200      ( 19 Jun 2019 05:16 )             40.6     06-19    144  |  105  |  8<L>  ----------------------------<  90  3.2<L>   |  28  |  0.6<L>    Ca    8.3<L>      19 Jun 2019 05:16    TPro  6.8  /  Alb  3.8  /  TBili  0.8  /  DBili  x   /  AST  21  /  ALT  15  /  AlkPhos  96  06-18    PT/INR - ( 18 Jun 2019 10:55 )   PT: 13.10 sec;   INR: 1.14 ratio         PTT - ( 18 Jun 2019 10:55 )  PTT:38.1 sec      Troponin T, Serum: <0.01 ng/mL (06-18-19 @ 16:51)  Creatine Kinase, Serum: 92 U/L (06-18-19 @ 10:55)  Troponin T, Serum: <0.01 ng/mL (06-18-19 @ 10:55)      CARDIAC MARKERS ( 18 Jun 2019 16:51 )  x     / <0.01 ng/mL / x     / x     / 2.7 ng/mL  CARDIAC MARKERS ( 18 Jun 2019 10:55 )  x     / <0.01 ng/mL / 92 U/L / x     / 2.5 ng/mL      RADIOLOGY:    PHYSICAL EXAM:  GEN: No acute distress  HEENT: NCAT  LUNGS: Clear to auscultation bilaterally   HEART: RRR. no murmurs  ABD: Soft, non-tender, non-distended  EXT: no edema  NEURO: AAOX3    Assessment and Plan: SUBJECTIVE:    Patient is a 78y old  Male who presents with a chief complaint of Left arm weakness (18 Jun 2019 13:58)    Currently admitted to medicine with the primary diagnosis of Unilateral weakness     Today is hospital day 1d. This morning he is resting comfortably in bed and reports no new issues or overnight events. L sided weakness resolved.    PAST MEDICAL & SURGICAL HISTORY  PAST MEDICAL & SURGICAL HISTORY:  Depression  Spleen injury  Benign prostate hyperplasia  Skin cancer: left ear  Hepatitis C virus infection cured after antiviral drug therapy  Other depression  Gastroesophageal reflux disease, esophagitis presence not specified  Hypercholesteremia      ALLERGIES:  No Known Allergies    MEDICATIONS:  STANDING MEDICATIONS  aspirin enteric coated 81 milliGRAM(s) Oral daily  atorvastatin 80 milliGRAM(s) Oral at bedtime  chlorhexidine 4% Liquid 1 Application(s) Topical <User Schedule>  clopidogrel Tablet 75 milliGRAM(s) Oral daily  cyanocobalamin 1000 MICROGram(s) Oral daily  enoxaparin Injectable 40 milliGRAM(s) SubCutaneous daily  escitalopram 20 milliGRAM(s) Oral daily  ferrous    sulfate 325 milliGRAM(s) Oral daily  fludroCORTISONE 0.1 milliGRAM(s) Oral daily  pantoprazole    Tablet 40 milliGRAM(s) Oral before breakfast  potassium chloride    Tablet ER 40 milliEquivalent(s) Oral once  tamsulosin 0.4 milliGRAM(s) Oral at bedtime    PRN MEDICATIONS    VITALS:   T(F): 97  HR: 62  BP: 143/75  RR: 16  SpO2: 97%    LABS:                        13.6   7.50  )-----------( 200      ( 19 Jun 2019 05:16 )             40.6     06-19    144  |  105  |  8<L>  ----------------------------<  90  3.2<L>   |  28  |  0.6<L>    Ca    8.3<L>      19 Jun 2019 05:16    TPro  6.8  /  Alb  3.8  /  TBili  0.8  /  DBili  x   /  AST  21  /  ALT  15  /  AlkPhos  96  06-18    PT/INR - ( 18 Jun 2019 10:55 )   PT: 13.10 sec;   INR: 1.14 ratio         PTT - ( 18 Jun 2019 10:55 )  PTT:38.1 sec      Troponin T, Serum: <0.01 ng/mL (06-18-19 @ 16:51)  Creatine Kinase, Serum: 92 U/L (06-18-19 @ 10:55)  Troponin T, Serum: <0.01 ng/mL (06-18-19 @ 10:55)      CARDIAC MARKERS ( 18 Jun 2019 16:51 )  x     / <0.01 ng/mL / x     / x     / 2.7 ng/mL  CARDIAC MARKERS ( 18 Jun 2019 10:55 )  x     / <0.01 ng/mL / 92 U/L / x     / 2.5 ng/mL      RADIOLOGY:    PHYSICAL EXAM:  GEN: No acute distress  HEENT: NCAT  LUNGS: Clear to auscultation bilaterally   HEART: RRR. no murmurs  ABD: Soft, non-tender, non-distended  EXT: no edema  NEURO: AAOX3    Assessment and Plan:  79 y/o with PMHx of TIA, BPH, Hyperlipidemia, TBI, skin cancer s/p ear sx and RT on remission , hepatitis C (treated) presents with Left sided weakness and numbness.    #Left sided weakness/numbness : R/o stroke   - CT head : unremarkable  - CTA :  Short 0.7 cm segment of mild (50-59%) stenosis of the proximal left internal carotid artery proximally 1.0 cm beyond the carotid bifurcation. 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

## 2019-06-19 NOTE — SWALLOW BEDSIDE ASSESSMENT ADULT - COMMENTS
Pt reported some difficulty swallowing since his RT (e.g., food feeling like its stuck, coughing/choking w/ liquids), however he has taught himself strategies to use to assist with the difficulty (e.g., slow rate, small sips/bites, thorough chewing). Pt reported eating mostly soft foods at home, and if he eats meat he cuts it up very small or dips it in applesauce to soften it +toleration w/ intermittent overt s/s penetration/aspiration w/ puree +toleration w/ intermittent overt s/s penetration/aspiration w/ soft solids

## 2019-06-20 ENCOUNTER — TRANSCRIPTION ENCOUNTER (OUTPATIENT)
Age: 79
End: 2019-06-20

## 2019-06-20 VITALS — WEIGHT: 185.41 LBS | HEIGHT: 73 IN

## 2019-06-20 DIAGNOSIS — I10 ESSENTIAL (PRIMARY) HYPERTENSION: ICD-10-CM

## 2019-06-20 LAB
ANION GAP SERPL CALC-SCNC: 8 MMOL/L — SIGNIFICANT CHANGE UP (ref 7–14)
BASOPHILS # BLD AUTO: 0.06 K/UL — SIGNIFICANT CHANGE UP (ref 0–0.2)
BASOPHILS NFR BLD AUTO: 0.7 % — SIGNIFICANT CHANGE UP (ref 0–1)
BUN SERPL-MCNC: 9 MG/DL — LOW (ref 10–20)
CALCIUM SERPL-MCNC: 8.4 MG/DL — LOW (ref 8.5–10.1)
CHLORIDE SERPL-SCNC: 103 MMOL/L — SIGNIFICANT CHANGE UP (ref 98–110)
CO2 SERPL-SCNC: 28 MMOL/L — SIGNIFICANT CHANGE UP (ref 17–32)
CREAT SERPL-MCNC: 0.7 MG/DL — SIGNIFICANT CHANGE UP (ref 0.7–1.5)
EOSINOPHIL # BLD AUTO: 0.37 K/UL — SIGNIFICANT CHANGE UP (ref 0–0.7)
EOSINOPHIL NFR BLD AUTO: 4.5 % — SIGNIFICANT CHANGE UP (ref 0–8)
GLUCOSE BLDC GLUCOMTR-MCNC: 88 MG/DL — SIGNIFICANT CHANGE UP (ref 70–99)
GLUCOSE BLDC GLUCOMTR-MCNC: 94 MG/DL — SIGNIFICANT CHANGE UP (ref 70–99)
GLUCOSE SERPL-MCNC: 84 MG/DL — SIGNIFICANT CHANGE UP (ref 70–99)
HCT VFR BLD CALC: 41.4 % — LOW (ref 42–52)
HGB BLD-MCNC: 13.7 G/DL — LOW (ref 14–18)
IMM GRANULOCYTES NFR BLD AUTO: 0.4 % — HIGH (ref 0.1–0.3)
LYMPHOCYTES # BLD AUTO: 2.14 K/UL — SIGNIFICANT CHANGE UP (ref 1.2–3.4)
LYMPHOCYTES # BLD AUTO: 26.3 % — SIGNIFICANT CHANGE UP (ref 20.5–51.1)
MAGNESIUM SERPL-MCNC: 2 MG/DL — SIGNIFICANT CHANGE UP (ref 1.8–2.4)
MCHC RBC-ENTMCNC: 31.9 PG — HIGH (ref 27–31)
MCHC RBC-ENTMCNC: 33.1 G/DL — SIGNIFICANT CHANGE UP (ref 32–37)
MCV RBC AUTO: 96.3 FL — HIGH (ref 80–94)
MONOCYTES # BLD AUTO: 1.12 K/UL — HIGH (ref 0.1–0.6)
MONOCYTES NFR BLD AUTO: 13.8 % — HIGH (ref 1.7–9.3)
NEUTROPHILS # BLD AUTO: 4.42 K/UL — SIGNIFICANT CHANGE UP (ref 1.4–6.5)
NEUTROPHILS NFR BLD AUTO: 54.3 % — SIGNIFICANT CHANGE UP (ref 42.2–75.2)
NRBC # BLD: 0 /100 WBCS — SIGNIFICANT CHANGE UP (ref 0–0)
PLATELET # BLD AUTO: 205 K/UL — SIGNIFICANT CHANGE UP (ref 130–400)
POTASSIUM SERPL-MCNC: 3.5 MMOL/L — SIGNIFICANT CHANGE UP (ref 3.5–5)
POTASSIUM SERPL-SCNC: 3.5 MMOL/L — SIGNIFICANT CHANGE UP (ref 3.5–5)
RBC # BLD: 4.3 M/UL — LOW (ref 4.7–6.1)
RBC # FLD: 14.1 % — SIGNIFICANT CHANGE UP (ref 11.5–14.5)
SODIUM SERPL-SCNC: 139 MMOL/L — SIGNIFICANT CHANGE UP (ref 135–146)
WBC # BLD: 8.14 K/UL — SIGNIFICANT CHANGE UP (ref 4.8–10.8)
WBC # FLD AUTO: 8.14 K/UL — SIGNIFICANT CHANGE UP (ref 4.8–10.8)

## 2019-06-20 PROCEDURE — 99239 HOSP IP/OBS DSCHRG MGMT >30: CPT

## 2019-06-20 PROCEDURE — 99232 SBSQ HOSP IP/OBS MODERATE 35: CPT

## 2019-06-20 RX ORDER — ATORVASTATIN CALCIUM 80 MG/1
1 TABLET, FILM COATED ORAL
Qty: 0 | Refills: 0 | DISCHARGE

## 2019-06-20 RX ORDER — LISINOPRIL 2.5 MG/1
1 TABLET ORAL
Qty: 30 | Refills: 0
Start: 2019-06-20 | End: 2019-07-19

## 2019-06-20 RX ORDER — ATORVASTATIN CALCIUM 80 MG/1
1 TABLET, FILM COATED ORAL
Qty: 30 | Refills: 0
Start: 2019-06-20 | End: 2019-07-19

## 2019-06-20 RX ORDER — CLOPIDOGREL BISULFATE 75 MG/1
1 TABLET, FILM COATED ORAL
Qty: 30 | Refills: 0
Start: 2019-06-20 | End: 2019-07-19

## 2019-06-20 RX ORDER — LISINOPRIL 2.5 MG/1
10 TABLET ORAL ONCE
Refills: 0 | Status: COMPLETED | OUTPATIENT
Start: 2019-06-20 | End: 2019-06-20

## 2019-06-20 RX ORDER — LISINOPRIL 2.5 MG/1
10 TABLET ORAL DAILY
Refills: 0 | Status: DISCONTINUED | OUTPATIENT
Start: 2019-06-20 | End: 2019-06-20

## 2019-06-20 RX ORDER — ATORVASTATIN CALCIUM 80 MG/1
40 TABLET, FILM COATED ORAL AT BEDTIME
Refills: 0 | Status: DISCONTINUED | OUTPATIENT
Start: 2019-06-20 | End: 2019-06-20

## 2019-06-20 RX ADMIN — CLOPIDOGREL BISULFATE 75 MILLIGRAM(S): 75 TABLET, FILM COATED ORAL at 11:30

## 2019-06-20 RX ADMIN — ESCITALOPRAM OXALATE 20 MILLIGRAM(S): 10 TABLET, FILM COATED ORAL at 11:30

## 2019-06-20 RX ADMIN — ENOXAPARIN SODIUM 40 MILLIGRAM(S): 100 INJECTION SUBCUTANEOUS at 11:30

## 2019-06-20 RX ADMIN — PREGABALIN 1000 MICROGRAM(S): 225 CAPSULE ORAL at 11:30

## 2019-06-20 RX ADMIN — Medication 325 MILLIGRAM(S): at 11:30

## 2019-06-20 RX ADMIN — PANTOPRAZOLE SODIUM 40 MILLIGRAM(S): 20 TABLET, DELAYED RELEASE ORAL at 05:55

## 2019-06-20 RX ADMIN — LISINOPRIL 10 MILLIGRAM(S): 2.5 TABLET ORAL at 15:51

## 2019-06-20 RX ADMIN — FLUDROCORTISONE ACETATE 0.1 MILLIGRAM(S): 0.1 TABLET ORAL at 05:55

## 2019-06-20 RX ADMIN — Medication 81 MILLIGRAM(S): at 11:30

## 2019-06-20 RX ADMIN — CHLORHEXIDINE GLUCONATE 1 APPLICATION(S): 213 SOLUTION TOPICAL at 05:54

## 2019-06-20 RX ADMIN — LISINOPRIL 2.5 MILLIGRAM(S): 2.5 TABLET ORAL at 05:55

## 2019-06-20 NOTE — OCCUPATIONAL THERAPY INITIAL EVALUATION ADULT - ADDITIONAL COMMENTS
Pt resides in  with 5 NGUYEN and no stairs inside. +bathtub Pt was going to outpatient PT PTA. Pt drives.

## 2019-06-20 NOTE — DISCHARGE NOTE PROVIDER - CARE PROVIDERS DIRECT ADDRESSES
,DirectAddress_Unknown ,[email or web address],DirectAddress_Unknown

## 2019-06-20 NOTE — OCCUPATIONAL THERAPY INITIAL EVALUATION ADULT - PREDICTED DURATION OF THERAPY (DAYS/WKS), OT EVAL
Pt spouse and daughter present, pt to be discharged to home today. Recommended pt follow up with outpatient OT services.

## 2019-06-20 NOTE — PHYSICAL THERAPY INITIAL EVALUATION ADULT - ACTIVE RANGE OF MOTION EXAMINATION, REHAB EVAL
bilateral  lower extremity Active ROM was WFL (within functional limits)/deficits as listed below/LUE shoulder ~ 135 degrees flexion

## 2019-06-20 NOTE — DISCHARGE NOTE PROVIDER - HOSPITAL COURSE
79 y/o with PMHx of TIA, BPH, Hyperlipidemia, TBI, skin cancer s/p ear sx and RT on remission , hepatitis C (treated). Patient presented  with Left sided weakness and numbness, associated with slurred speech. He states that he suddenly started feeling weak and had unsteady gait which gradually improved but still not at his baseline. He also reported feeling numbness on entire left side (both UE and LE). Denies any headache/n/v/dizziness/vision change/slurred speech. He has always had difficulty swallowing from radiation therapy that he received for his skin cancer but no new changes now. Denies fever/chills/cehst pain/sob.abd pain.GI or  symptoms. ROS otherwise negative.        In ED, patient had NIHSS of 6, and code stroke was run. CT perfusion studies showed         Short 0.7 cm segment of mild (50-59%) stenosis of the proximal left     internal carotid artery proximally 1.0 cm beyond the carotid bifurcation.        2.  No significant stenosis involving the right internal carotid artery.        3.  Persistent fetal origin of the right PCA off the right internal     carotid artery with small/hypoplastic P1 segment of the right PCA.        4.  Dominant right vertebral artery with small/hypoplastic left vertebral     artery.        5.  Otherwise unremarkable CT angiogram of the head.        6.  Normal perfusion images of the brain.        It also showed persistent fetal origin of right PCA off the right internal carotid artery        MRI studies showed small lacunar infarcts in the right cerebral hemispheres, along with small lesions in white matter, possibly sec to hypertension.            patient was started on Plavix, aspirin, atorvatstain  and DVT prophylaxis.        Patient has significantly improved the course of last 2 days, with NIHSS of 1 (slight dysarthia) He had no focal neurological deficit today, power was 5/5 in all extremities, and patient is able to ambulate without support. 79 y/o with PMHx of TIA, BPH, Hyperlipidemia, TBI, skin cancer s/p ear sx and RT on remission , hepatitis C (treated). Patient presented  with Left sided weakness and numbness, associated with slurred speech. He states that he suddenly started feeling weak and had unsteady gait which gradually improved but still not at his baseline. He also reported feeling numbness on entire left side (both UE and LE). Denies any headache/n/v/dizziness/vision change/slurred speech. He has always had difficulty swallowing from radiation therapy that he received for his skin cancer but no new changes now. Denies fever/chills/cehst pain/sob.abd pain.GI or  symptoms. ROS otherwise negative.        In ED, patient had NIHSS of 6, and code stroke was run. CT perfusion studies showed         Short 0.7 cm segment of mild (50-59%) stenosis of the proximal left     internal carotid artery proximally 1.0 cm beyond the carotid bifurcation.        MRI studies showed small lacunar infarcts in the right cerebral hemispheres, along with small lesions in white matter, possibly sec to hypertension.            patient was started on Plavix, in addition to aspirin, atorvatstain  and DVT prophylaxis.        Patient has significantly improved the course of last 2 days, with persistent LUE weakness and drift.

## 2019-06-20 NOTE — SWALLOW BEDSIDE ASSESSMENT ADULT - SLP PERTINENT HISTORY OF CURRENT PROBLEM
Pt presented w/ L sided weakness and numbness; PMHx: TIA, BPH, TBI, HLD, skin CA s/p ear sx and RT in remission, GERD, hepatitis C; CTH (-), CXR (-), MRI (+) Acute lacunar infarct right posterior centrum semiovale

## 2019-06-20 NOTE — PROGRESS NOTE ADULT - SUBJECTIVE AND OBJECTIVE BOX
Neurology Follow up note  Patient seen and examined at bedside, he denies any new complaints. left arm drift and left ataxia persists. No acute events. Sinus rythum on the cardiac monitor. Patient able to ambulate independently.    Vital Signs Last 24 Hrs  T(C): 35.9 (19 Jun 2019 18:46), Max: 37.1 (18 Jun 2019 21:05)  T(F): 96.6 (19 Jun 2019 18:46), Max: 98.7 (18 Jun 2019 21:05)  HR: 69 (19 Jun 2019 18:46) (62 - 70)  BP: 155/82 (19 Jun 2019 18:46) (143/75 - 160/84)  BP(mean): --  RR: 18 (19 Jun 2019 18:46) (16 - 18)  SpO2: 97% (18 Jun 2019 21:05) (97% - 97%)      Neurological Exam:   Mental status: Awake, alert and oriented x 3.  Naming, repetition and comprehension intact.  Attention/concentration intact.  No dysarthria, no aphasia.  Fund of knowledge appropriate.    Cranial nerves: Intact except for mild left facial droop   Motor: RUE 5/5  RLE 5/5             LUE 5/5 prox 4+/5 distal  LLE 5/5 (Left arm drift)  Sensation: Intact and symmetric  Coordination: dysmetria on the left , no limb ataxia noted  Gait: steady with occasional assist    NIHSS      LOC:       1a: 0    1b(Questions):  0         1c(Instructions): 0            Best Gaze:0  Visual:0  Motor:                 RUE:  0   RLE:  0   LUE:  1   LLE: 0    FACE: 0    Limb Ataxia:1  face 1 (left face)  Sensory:    0   Language:  0     Dysarthria:   0       Extinction and Inattention: 0    NIHSS today: 3              m-Rs  0      Medications  aspirin enteric coated 81 milliGRAM(s) Oral daily  atorvastatin 80 milliGRAM(s) Oral at bedtime  chlorhexidine 4% Liquid 1 Application(s) Topical <User Schedule>  clopidogrel Tablet 75 milliGRAM(s) Oral daily  cyanocobalamin 1000 MICROGram(s) Oral daily  enoxaparin Injectable 40 milliGRAM(s) SubCutaneous daily  escitalopram 20 milliGRAM(s) Oral daily  ferrous    sulfate 325 milliGRAM(s) Oral daily  fludroCORTISONE 0.1 milliGRAM(s) Oral daily  lisinopril 2.5 milliGRAM(s) Oral daily  pantoprazole    Tablet 40 milliGRAM(s) Oral before breakfast  tamsulosin 0.4 milliGRAM(s) Oral at bedtime      Lab  Hemoglobin A1C with Mean Plasma Glucose (06.19.19 @ 05:16)    Hemoglobin A1C, Whole Blood: 5.1: Method: Immunoassay         Complete Blood Count + Automated Diff (06.19.19 @ 05:16)    WBC Count: 7.50 K/uL    RBC Count: 4.25 M/uL    Hemoglobin: 13.6 g/dL    Hematocrit: 40.6 %    Mean Cell Volume: 95.5 fL    Mean Cell Hemoglobin: 32.0 pg    Mean Cell Hemoglobin Conc: 33.5 g/dL    Red Cell Distrib Width: 14.1 %    Platelet Count - Automated: 200 K/uL    Auto Neutrophil #: 3.91 K/uL    Auto Lymphocyte #: 2.16 K/uL    Auto Monocyte #: 1.02 K/uL    Auto Eosinophil #: 0.33 K/uL    Auto Basophil #: 0.06 K/uL    Auto Neutrophil %: 52.1: Differential percentages must be correlated with absolute numbers for  clinical significance. %    Auto Lymphocyte %: 28.8 %    Auto Monocyte %: 13.6 %    Auto Eosinophil %: 4.4 %    Auto Basophil %: 0.8 %    Auto Immature Granulocyte %: 0.3 %    Nucleated RBC: 0 /100 WBCs    Lipid Profile (06.19.19 @ 05:16)    Total Cholesterol/HDL Ratio Measurement: 3.2 Ratio    Cholesterol, Serum: 108 mg/dL    Triglycerides, Serum: 63 mg/dL    HDL Cholesterol, Serum: 34: HDL Levels >/= 60 mg/dL are considered beneficial and a "negative" risk  factor.  Effective 08/15/2018: New reference range and interpretive comment. mg/dL    Direct LDL: 65: LDL Cholesterol (mg/dL) --- Interpretive Comment (for adults 18 and over)    Radiology
Pt seen and examined independently. No new complaints. Feeling better. Ambulating on his own. Cleared by neuro. No events on tele.    Gen:NAD, AAOx3, chronic dysarthria  CV: S1 S2  Resp: Decreased BS b/l  GI: NT/ND/S +BS  MS: neg c/c/e  Neuro: LUE 4/5, rest 5/5    Discharge instructions discussed and patient/spouse/daughter know when to seek immediate medical attention.  Patient has proper follow up.  All results discussed and patient/spouse/daughter aware they may require further work up.  Stressed importance of proper follow up.  Medications prescribed and changes discussed.  All questions and concerns from patient and family addressed. Understanding of instructions verbalized.    Time spent in completing discharge process and coordinating care 45 minutes.
ÁLVARO SELLERS    Chief Complaint:    Handed    HPI:  77 y/o with PMHx of TIA, BPH, Hyperlipidemia, TBI, skin cancer s/p ear sx and RT on remission , hepatitis C (treated)   presents with Left sided weakness and numbness that started around 12 pm yesterday. He states that he suddenly started feeling weak and had unsteady gait since yesterday afternoon which has now improved but still not at his baseline. Also reports feeling numb on entire left side (both UE and LE). As per wife, his symptoms have been on and off but he states that its continuous since yesterday. Denies any headache/n/v/dizziness/vision change/slurred speech. He has always had difficulty swallowing from radiation therapy that he received for his skin cancer but no new changes now. Denies fever/chills/cehst pain/sob.abd pain.GI or  symptoms. ROS otherwise negative. (18 Jun 2019 13:58)    Patient is examined at the bedside, his aaox3, eating breakfast. Patient states he is doing better. On exam has LUE mild drift with ataxia. NIHSS is 2.  Irregular rhythm on tele.  No acute overnight events.    Relevant PMH:  [] Prior ischemic stroke/TIA  [] Afib  []CAD  []HTN  []DLD  []DM []PVD []Obesity [] Sedintary lifestyle []CHF  []JEFFREY  []Cancer Hx     Social History: [] Smoking []  Drug Use: []   Alcohol Use:   [] Other:      Possible Location of Stroke:    Possible Cause of Stroke:    Relevant Cerebral Imaging:    Relevant Cervicocerebral Imaging:      CT Perfusion w/ Maps w/ IV Cont:   EXAM:  CT PERFUSION W MAPS IC            PROCEDURE DATE:  06/18/2019            INTERPRETATION:  Clinical History / Reason for exam: Stroke. Stroke code.        Technique: CT perfusion of the brain was performed along with CTA of the   head and neck after the intravenous administration of 120 cc Optiray 350   contrast. Sagittal, coronal and axial reformatted images were obtained as   well as 3-D volume rendered images. 80 cc of contrast were discarded      No comparison studies are available.      Findings:      CTA neck:     There is a common origin of the innominate and left common carotid   artery, bovine arch, normal variant. Normal origin is noted of the left   subclavian artery. Basilar calcifications are noted of the aortic arch   consistent atherosclerotic changes. The bilateral common carotid arteries   are unremarkable without significant stenosis seen. Gill calcification   is noted at the proximal left internal carotid artery approximately 1 cm   beyond the carotid bifurcation resulting in short 0.7 cm segment of mild   (50-59%) stenosis. The right internal carotid artery also demonstrates   vascular calcifications just beyond the carotid bifurcation with no   significant stenosis. There is medial deviation of the rightcervical   carotid coursing through the retropharynx at the level of C2-C3. The   vertebral arteries are slightly dominant on the right and   small/hypoplastic on the left.       CTA head:     The distal internal carotid arteries, middle cerebral arteries and   anterior cerebral arteries are unremarkable without significant stenosis.   Vascular calcifications are noted in the bilateral cavernous carotid   arteries. Sclerotic changes. The basilar artery, superior cerebellar   arteries and posterior cerebral arteries are unremarkable without   significant stenosis. Persistent fetal origin of the right PCA of the   right internal carotid arteries noted with small/hypoplastic P1 segment   of the right petrous CVA.    There is contrast filling of a small left posterior commuting artery.    No aneurysms or vascular malformations are identified.    Degenerative changes are noted of the cervical spine with straightening   and reversal of the normal cervical lordosis, disc space narrowing   vertebral endplate spurring worse at C3-4, C4-5, C5-6 and C6-7.      CT perfusion:     The cerebral blood volume and time to peak images demonstrate no   significant evidence of fixed or mismatched focal perfusion deficit to   suggest acute cerebral ischemia or infarct.      IMPRESSION:    1.  Short 0.7 cm segment of mild (50-59%) stenosis of the proximal left   internal carotid artery proximally 1.0 cm beyond the carotid bifurcation.    2.  No significant stenosis involving the right internal carotid artery.    3.  Persistent fetal origin of the right PCA off the right internal   carotid artery with small/hypoplastic P1 segment of the right PCA.    4.  Dominant right vertebral artery with small/hypoplastic left vertebral   artery.    5.  Otherwise unremarkable CT angiogram of the head.    6.  Normal perfusion images of the brain.      Spoke with BELLA DU on 6/18/2019 at 11:20 AM with readback.      GILBERTO WADE M.D., ATTENDING RADIOLOGIST  This document has been electronically signed. Jun 18 2019  2:04PM             (06-18-19 @ 11:16)      Relevant blood tests:  Direct LDL: 65 mg/dL [4 - 129] (06-19-19 @ 05:16)      Relevant cardiac rhythm monitoring:    Relevant Cardiac Structure:(TTE/BHUPENDRA +/-):[]No intracardiac thrombus/[] no vegetation/[]no [place]/EF:      Home Medications:  Aspirin Low Dose 81 mg oral delayed release tablet: 1 tab(s) orally once a day (18 Jun 2019 14:44)  cyanocobalamin 1000 mcg oral tablet: 1 tab(s) orally once a day (18 Jun 2019 14:44)  cyanocobalamin 1000 mcg/mL injectable solution: 1000 microgram(s) injectable once a month (18 Jun 2019 14:44)  ferrous sulfate 324 mg (65 mg elemental iron) oral tablet: 1 tab(s) orally (18 Jun 2019 14:44)  Flomax 0.4 mg oral capsule: 1 cap(s) orally once a day (at bedtime) (18 Jun 2019 14:44)  Florinef Acetate 0.1 mg oral tablet: 1 tab(s) orally once a day (18 Jun 2019 14:44)  Lexapro 20 mg oral tablet: 1 tab(s) orally once a day (18 Jun 2019 14:44)  Lipitor 80 mg oral tablet: 1 tab(s) orally once a day (at bedtime) (18 Jun 2019 14:44)  pantoprazole 40 mg oral delayed release tablet: 1 tab(s) orally once a day (18 Jun 2019 14:44)      MEDICATIONS  (STANDING):  aspirin enteric coated 81 milliGRAM(s) Oral daily  atorvastatin 80 milliGRAM(s) Oral at bedtime  chlorhexidine 4% Liquid 1 Application(s) Topical <User Schedule>  clopidogrel Tablet 75 milliGRAM(s) Oral daily  cyanocobalamin 1000 MICROGram(s) Oral daily  enoxaparin Injectable 40 milliGRAM(s) SubCutaneous daily  escitalopram 20 milliGRAM(s) Oral daily  ferrous    sulfate 325 milliGRAM(s) Oral daily  fludroCORTISONE 0.1 milliGRAM(s) Oral daily  lisinopril 2.5 milliGRAM(s) Oral daily  pantoprazole    Tablet 40 milliGRAM(s) Oral before breakfast  potassium chloride    Tablet ER 40 milliEquivalent(s) Oral once  tamsulosin 0.4 milliGRAM(s) Oral at bedtime      PT/OT/Speech/Rehab/S&Swr:    Exam:    Vital Signs Last 24 Hrs  T(C): 36.1 (19 Jun 2019 05:42), Max: 37.3 (18 Jun 2019 20:00)  T(F): 97 (19 Jun 2019 05:42), Max: 99.1 (18 Jun 2019 20:00)  HR: 62 (19 Jun 2019 05:42) (58 - 71)  BP: 143/75 (19 Jun 2019 05:42) (143/75 - 196/109)  BP(mean): 109 (18 Jun 2019 19:00) (109 - 142)  RR: 16 (19 Jun 2019 05:42) (11 - 19)  SpO2: 97% (18 Jun 2019 21:05) (97% - 99%)    NIHSS      LOC:       1a: 0    1b(Questions):  0         1c(Instructions): 0            Best Gaze:0  Visual:0  Motor:                 RUE:  0   RLE:  0   LUE:  1   LLE: 0    FACE: 0    Limb Ataxia:1  Sensory:    0   Language:  0     Dysarthria:   0       Extinction and Inattention: 0    NIHSS on admission:          NIHSS yesterday:   6       NIHSS today: 2            m-RS: 0    Impression:      Suggestion:  Routine stroke workup including:    Disposition:

## 2019-06-20 NOTE — OCCUPATIONAL THERAPY INITIAL EVALUATION ADULT - RANGE OF MOTION EXAMINATION, UPPER EXTREMITY
left shoulder 0-135 shoulder flexion, elbow to digits WFL/Right UE Active ROM was WNL (within normal limits)

## 2019-06-20 NOTE — SWALLOW BEDSIDE ASSESSMENT ADULT - COMMENTS
Pt reported some difficulty swallowing since his Radiation Therapy ("a couple years") (e.g., globus sensation, coughing/choking w/ liquids), however he has taught himself strategies to use to assist with the difficulty (e.g., slow rate, small sips/bites, thorough chewing). Pt reported eating mostly soft foods at home, and if he eats meat he cuts it up very small or dips it in applesauce to soften it.    Patient will be d/c today and will receive outpatient services [facility]. Recommend Modified Barium swallow study to fully assess oropharyngeal function upon the start of OP services. MD and patient aware of recommendations.

## 2019-06-20 NOTE — PHYSICAL THERAPY INITIAL EVALUATION ADULT - GAIT DISTANCE, PT EVAL
initially without a.d.  100 ft x 1, with occasional unsteady with turns. Attempted ambulation with st. cane for balance, but pt had difficulty with sequencing. Pt ambulated 100 ft x 1 with RW with improved balance.

## 2019-06-20 NOTE — SWALLOW BEDSIDE ASSESSMENT ADULT - SLP GENERAL OBSERVATIONS
Pt received sitting upright in bed, alert and oriented x4 w/ no c/o pain. +room air.
Pt received sitting upright in bed, alert and oriented x4 w/ no c/o pain. +room air.

## 2019-06-20 NOTE — DISCHARGE NOTE PROVIDER - NSDCQMMRS_NEU_ALL_CORE
1 - No significant disability. Able to carry out all usual activities, despite some symptoms 2 - Slight disability. Able to lookafter own affairs without assistance, but unable to carry out all previous activities

## 2019-06-20 NOTE — DISCHARGE NOTE PROVIDER - PROVIDER TOKENS
PROVIDER:[TOKEN:[[id number]]] PROVIDER:[TOKEN:[[id number]],FOLLOWUP:[2 weeks]],FREE:[LAST:[Primary Medical Doctor],PHONE:[(   )    -],FAX:[(   )    -],FOLLOWUP:[1 week]]

## 2019-06-20 NOTE — CONSULT NOTE ADULT - SUBJECTIVE AND OBJECTIVE BOX
Patient is a 78y old  Male who presents with a chief complaint of Left arm weakness (19 Jun 2019 20:25)    HPI:  79 y/o with PMHx of TIA, BPH, Hyperlipidemia, TBI, skin cancer s/p ear sx and RT on remission , hepatitis C (treated)   presents with Left sided weakness and numbness that started around 12 pm yesterday. He states that he suddenly started feeling weak and had unsteady gait since yesterday afternoon which has now improved but still not at his baseline. Also reports feeling numb on entire left side (both UE and LE). As per wife, his symptoms have been on and off but he states that its continuous since yesterday. Denies any headache/n/v/dizziness/vision change/slurred speech. He has always had difficulty swallowing from radiation therapy that he received for his skin cancer but no new changes now. Denies fever/chills/cehst pain/sob.abd pain.GI or  symptoms. ROS otherwise negative. (18 Jun 2019 13:58)      PAST MEDICAL & SURGICAL HISTORY:  Depression  Spleen injury  Benign prostate hyperplasia  Skin cancer: left ear  Hepatitis C virus infection cured after antiviral drug therapy  Other depression  Gastroesophageal reflux disease, esophagitis presence not specified  Hypercholesteremia      Hospital Course: seen by Neuro-CT head : unremarkable  - CTA :  Short 0.7 cm segment of mild (50-59%) stenosis of the proximal left internal carotid artery proximally 1.0 cm beyond the carotid bifurcation. 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    2.  Periventricular and subcortical white matter chronic small vessel   ischemic changes and multiple old lacunar infarcts as described above.    3.  Partial opacification left mastoid air complex.    4.  No acute intracranial hemorrhage.      < end of copied text >        TODAY'S SUBJECTIVE & REVIEW OF SYMPTOMS:     Constitutional WNL   Cardio WNL   Resp WNL   GI WNL  Heme WNL  Endo WNL  Skin WNL  MSK WNL  Neuro WNL  Cognitive WNL  Psych WNL      MEDICATIONS  (STANDING):  aspirin enteric coated 81 milliGRAM(s) Oral daily  atorvastatin 40 milliGRAM(s) Oral at bedtime  chlorhexidine 4% Liquid 1 Application(s) Topical <User Schedule>  clopidogrel Tablet 75 milliGRAM(s) Oral daily  cyanocobalamin 1000 MICROGram(s) Oral daily  enoxaparin Injectable 40 milliGRAM(s) SubCutaneous daily  escitalopram 20 milliGRAM(s) Oral daily  ferrous    sulfate 325 milliGRAM(s) Oral daily  fludroCORTISONE 0.1 milliGRAM(s) Oral daily  lisinopril 2.5 milliGRAM(s) Oral daily  pantoprazole    Tablet 40 milliGRAM(s) Oral before breakfast  tamsulosin 0.4 milliGRAM(s) Oral at bedtime    MEDICATIONS  (PRN):      FAMILY HISTORY:  Family history of throat cancer (Father)  Family history of breast cancer in female (Mother)      Allergies    No Known Allergies    Intolerances        SOCIAL HISTORY:    [    ] Etoh  [    ] Smoking  [    ] Substance abuse     Home Environment:  [    ] Home Alone  [    ] Lives with Family  [    ] Home Health Aid    Dwelling:  [    ] Apartment  [  x  ] Private House  [    ] Adult Home  [    ] Skilled Nursing Facility      [    ] Short Term  [    ] Long Term  [   x ] Stairs   outside                        [    ] Elevator     FUNCTIONAL STATUS PTA: (Check all that apply)  Ambulation: [   x  ]Independent    [    ] Dependent     [    ] Non-Ambulatory  Assistive Device: [    ] SA Cane  [    ]  Q Cane  [    ] Walker  [    ]  Wheelchair  ADL : [  x  ] Independent  [    ]  Dependent       Vital Signs Last 24 Hrs  T(C): 35.6 (20 Jun 2019 14:09), Max: 36.3 (20 Jun 2019 10:42)  T(F): 96 (20 Jun 2019 14:09), Max: 97.4 (20 Jun 2019 10:42)  HR: 69 (20 Jun 2019 14:16) (62 - 78)  BP: 173/87 (20 Jun 2019 14:16) (145/77 - 179/96)  BP(mean): --  RR: 17 (20 Jun 2019 14:09) (17 - 18)  SpO2: --      PHYSICAL EXAM: Alert & Oriented X3 dysarthric  GENERAL: NAD, well-groomed, well-developed  HEAD:  Atraumatic, Normocephalic  EYES: EOMI, PERRLA, conjunctiva and sclera clear  NECK: Supple  CHEST/LUNG: Clear bilaterally  HEART: Regular rate and rhythm  ABDOMEN: Soft, Nontender, Nondistended; Bowel sounds present  EXTREMITIES:  no calf tenderness,no edema BLES    NERVOUS SYSTEM:  Cranial Nerves 2-12 intact [  x  ] Abnormal  [    ]  ROM: WFL all extremities [  x  ]  Abnormal [     ]  Motor Strength: WFL all extremities  [    ]  Abnormal [ x   ]4/5 LUE prox  Sensation: intact to light touch [   x ] Abnormal [    ]      FUNCTIONAL STATUS:  Bed Mobility: [   ]  Independent [  x  ]  Supervision [    ]  Needs Assistance [  ]  N/A  Transfers: [    ]  Independent [   x ]  Supervision [    ]  Needs Assistance [    ]  N/A    Ambulation:  [    ]  Independent [   x ]  Supervision [    ]  Needs Assistance [    ]  N/A   ADL:  [    ]   Independent [    ] Requires Assistance [  x  ] N/A   DIMINISHED COORDINATION LUE/ MILD ATAXIA    LABS:                        13.7   8.14  )-----------( 205      ( 20 Jun 2019 05:31 )             41.4     06-20    139  |  103  |  9<L>  ----------------------------<  84  3.5   |  28  |  0.7    Ca    8.4<L>      20 Jun 2019 05:31  Mg     2.0     06-20            RADIOLOGY & ADDITIONAL STUDIES:

## 2019-06-20 NOTE — PHYSICAL THERAPY INITIAL EVALUATION ADULT - PLANNED THERAPY INTERVENTIONS, PT EVAL
transfer training/balance training/gait training balance training/Pt is being discharged home, recommend outpatient PT/neuromuscular re-education

## 2019-06-20 NOTE — DISCHARGE NOTE PROVIDER - CARE PROVIDER_API CALL
Marck Sandoval)  Internal Medicine  [address] [place] [address]  Phone: [phone]  Fax: [phone]  Follow Up Time: Nitin Mak)  EEGEpilepsy; Neurology  [address] [place] [address], [address]  [place] [address]  Phone: [phone]  Fax: [phone]  Follow Up Time: 2 weeks    Primary Medical Doctor,   Phone: (   )    -  Fax: (   )    -  Follow Up Time: 1 week

## 2019-06-20 NOTE — DISCHARGE NOTE PROVIDER - NSDCCPCAREPLAN_GEN_ALL_CORE_FT
PRINCIPAL DISCHARGE DIAGNOSIS  Diagnosis: Unilateral weakness  Assessment and Plan of Treatment: You presented to the hospital with left sided body weakness and numbness. Imaging showed narrowing of  the artery supplying blood to your brain. We started you on blood thinners that resulted in marked improvement. You are required to take your meds regularly. and check your blood pressure regularly and contact your  if its noit controlled      SECONDARY DISCHARGE DIAGNOSES  Diagnosis: Dysarthria  Assessment and Plan of Treatment: You prresented to hospital with difficulty speaking associated with left sided body weakness. You were started on blood thinners. You are required to take your meds regularly and adpt lifestyle modifidcations. PRINCIPAL DISCHARGE DIAGNOSIS  Diagnosis: Unilateral weakness  Assessment and Plan of Treatment: You presented to the hospital with left sided body weakness and numbness. Imaging showed narrowing of  the Lt Carotid artery supplying blood to your brain. MRI showed Rt sided lacunar CVA. Start taking Plavix in addition to ASA and plavix. Follow up with neurologist in 2wks.      SECONDARY DISCHARGE DIAGNOSES  Diagnosis: Carotid stenosis  Assessment and Plan of Treatment: moderate stenosis on the Left. F/u with neurologist for monitoring.    Diagnosis: Hypertension  Assessment and Plan of Treatment: Your blood pressure was elevated in the hospital. We started you on Lisinopril. Please discuss with your PMD the need for Florinef. Monitor your BP at home and bring results to PMD.

## 2019-06-20 NOTE — CONSULT NOTE ADULT - ASSESSMENT
IMPRESSION: Rehab of CVA/Ataxia/dysarthria/dysphagia    PRECAUTIONS: [  x  ] Cardiac  [    ] Respiratory  [    ] Seizures [    ] Contact Isolation  [    ] Droplet Isolation  [    ] Other    Weight Bearing Status:     RECOMMENDATION:    Out of Bed to Chair     DVT/Decubiti Prophylaxis    REHAB PLAN:     [x     ] Bedside P/T 3-5 times a week   [     ] Bedside O/T  2-3 times a week   [     ] No Rehab Therapy Indicated   [   x  ]  Speech Therapy   Conditioning/ROM                                 ADL  Bed Mobility                                            Conditioning/ROM  Transfers                                                  Bed Mobility  Sitting /Standing Balance                      Transfers                                        Gait Training                                            Sitting/Standing Balance  Stair Training [   ]Applicable                 Home equipment Eval                                                                     Splinting  [   ] Only      GOALS:   ADL   [  x  ]   Independent         Transfers  [ x   ] Independent            Ambulation  [  x   ] Independent     [     ] With device                            [    ]  CG                                               [    ]  CG                                                    [     ] CG                            [    ] Min A                                          [    ] Min A                                                [     ] Min  A          DISCHARGE PLAN:   [     ]  Good candidate for Intensive Rehabilitation/Hospital based                                             Will tolerate 3hrs Intensive Rehab Daily                                       [      ]  Short Term Rehab in Skilled Nursing Facility                                       [  x    ]  Home with Outpatient or VN services OPD PT/OT/SPEECH AT [facility]                                         [      ]  Possible Candidate for Intensive Hospital based Rehab

## 2019-06-20 NOTE — PHYSICAL THERAPY INITIAL EVALUATION ADULT - GENERAL OBSERVATIONS, REHAB EVAL
Pt does not have activity orders at this time, Dr. Sandoval made aware. Recommend to hold PT until OOB orders are updated.
13:25-13:59 Pt encountered seated in b/s chair in NAD. + family present, + tele. Pt agreeable for PT. No c/o offered at this time.

## 2019-06-20 NOTE — DISCHARGE NOTE NURSING/CASE MANAGEMENT/SOCIAL WORK - NSDCDPATPORTLINK_GEN_ALL_CORE
You can access the [facility], offered by [facility], by registering with the following website: [email or web address]

## 2019-06-20 NOTE — SWALLOW BEDSIDE ASSESSMENT ADULT - SWALLOW EVAL: RECOMMENDED FEEDING/EATING TECHNIQUES
position upright (90 degrees)/small sips/bites/oral hygiene/alternate food with liquid
alternate food with liquid/position upright (90 degrees)/oral hygiene/small sips/bites

## 2019-06-21 LAB — GLUCOSE BLDC GLUCOMTR-MCNC: 83 MG/DL — SIGNIFICANT CHANGE UP (ref 70–99)
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
## 2019-06-26 DIAGNOSIS — E78.00 PURE HYPERCHOLESTEROLEMIA, UNSPECIFIED: ICD-10-CM
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
## 2019-06-26 DIAGNOSIS — Z87.820 PERSONAL HISTORY OF TRAUMATIC BRAIN INJURY: ICD-10-CM

## 2019-06-26 DIAGNOSIS — Z86.73 PERSONAL HISTORY OF TRANSIENT ISCHEMIC ATTACK (TIA), AND CEREBRAL INFARCTION WITHOUT RESIDUAL DEFICITS: ICD-10-CM

## 2019-06-26 DIAGNOSIS — F41.9 ANXIETY DISORDER, UNSPECIFIED: ICD-10-CM

## 2019-06-26 DIAGNOSIS — Z80.2 FAMILY HISTORY OF MALIGNANT NEOPLASM OF OTHER RESPIRATORY AND INTRATHORACIC ORGANS: ICD-10-CM

## 2019-06-26 DIAGNOSIS — I10 ESSENTIAL (PRIMARY) HYPERTENSION: ICD-10-CM

## 2019-06-26 DIAGNOSIS — R13.19 OTHER DYSPHAGIA: ICD-10-CM

## 2019-06-26 DIAGNOSIS — F32.9 MAJOR DEPRESSIVE DISORDER, SINGLE EPISODE, UNSPECIFIED: ICD-10-CM

## 2019-06-26 DIAGNOSIS — R26.89 OTHER ABNORMALITIES OF GAIT AND MOBILITY: ICD-10-CM

## 2019-06-26 DIAGNOSIS — R29.810 FACIAL WEAKNESS: ICD-10-CM

## 2019-06-26 DIAGNOSIS — N40.0 BENIGN PROSTATIC HYPERPLASIA WITHOUT LOWER URINARY TRACT SYMPTOMS: ICD-10-CM

## 2019-06-26 DIAGNOSIS — R47.1 DYSARTHRIA AND ANARTHRIA: ICD-10-CM

## 2019-06-26 DIAGNOSIS — Z79.82 LONG TERM (CURRENT) USE OF ASPIRIN: ICD-10-CM

## 2019-06-26 DIAGNOSIS — K21.9 GASTRO-ESOPHAGEAL REFLUX DISEASE WITHOUT ESOPHAGITIS: ICD-10-CM

## 2019-06-26 DIAGNOSIS — Z86.19 PERSONAL HISTORY OF OTHER INFECTIOUS AND PARASITIC DISEASES: ICD-10-CM

## 2019-06-26 DIAGNOSIS — Z85.828 PERSONAL HISTORY OF OTHER MALIGNANT NEOPLASM OF SKIN: ICD-10-CM

## 2019-06-26 DIAGNOSIS — I63.9 CEREBRAL INFARCTION, UNSPECIFIED: ICD-10-CM

## 2019-06-26 DIAGNOSIS — G81.94 HEMIPLEGIA, UNSPECIFIED AFFECTING LEFT NONDOMINANT SIDE: ICD-10-CM

## 2019-06-26 DIAGNOSIS — I63.232 CEREBRAL INFARCTION DUE TO UNSPECIFIED OCCLUSION OR STENOSIS OF LEFT CAROTID ARTERIES: ICD-10-CM

## 2019-08-08 ENCOUNTER — OUTPATIENT (OUTPATIENT)
Dept: OUTPATIENT SERVICES | Facility: HOSPITAL | Age: 79
LOS: 1 days | Discharge: HOME | End: 2019-08-08
Payer: COMMERCIAL

## 2019-08-08 DIAGNOSIS — R13.12 DYSPHAGIA, OROPHARYNGEAL PHASE: ICD-10-CM

## 2019-08-08 PROCEDURE — 74230 X-RAY XM SWLNG FUNCJ C+: CPT | Mod: 26

## 2019-08-12 DIAGNOSIS — Z02.9 ENCOUNTER FOR ADMINISTRATIVE EXAMINATIONS, UNSPECIFIED: ICD-10-CM

## 2019-09-16 ENCOUNTER — OUTPATIENT (OUTPATIENT)
Dept: OUTPATIENT SERVICES | Facility: HOSPITAL | Age: 79
LOS: 1 days | Discharge: HOME | End: 2019-09-16

## 2019-09-16 DIAGNOSIS — I69.00 UNSPECIFIED SEQUELAE OF NONTRAUMATIC SUBARACHNOID HEMORRHAGE: ICD-10-CM

## 2019-09-16 DIAGNOSIS — G81.94 HEMIPLEGIA, UNSPECIFIED AFFECTING LEFT NONDOMINANT SIDE: ICD-10-CM

## 2019-09-16 DIAGNOSIS — I63.9 CEREBRAL INFARCTION, UNSPECIFIED: ICD-10-CM

## 2019-09-16 DIAGNOSIS — I63.232 CEREBRAL INFARCTION DUE TO UNSPECIFIED OCCLUSION OR STENOSIS OF LEFT CAROTID ARTERIES: ICD-10-CM

## 2019-09-16 DIAGNOSIS — R13.12 DYSPHAGIA, OROPHARYNGEAL PHASE: ICD-10-CM

## 2020-02-10 ENCOUNTER — OUTPATIENT (OUTPATIENT)
Dept: OUTPATIENT SERVICES | Facility: HOSPITAL | Age: 80
LOS: 1 days | Discharge: HOME | End: 2020-02-10
Payer: MEDICARE

## 2020-02-10 ENCOUNTER — TRANSCRIPTION ENCOUNTER (OUTPATIENT)
Age: 80
End: 2020-02-10

## 2020-02-10 ENCOUNTER — RESULT REVIEW (OUTPATIENT)
Age: 80
End: 2020-02-10

## 2020-02-10 VITALS — DIASTOLIC BLOOD PRESSURE: 75 MMHG | HEART RATE: 84 BPM | SYSTOLIC BLOOD PRESSURE: 141 MMHG | RESPIRATION RATE: 18 BRPM

## 2020-02-10 VITALS — WEIGHT: 185.19 LBS | HEIGHT: 70.87 IN

## 2020-02-10 DIAGNOSIS — Z87.828 PERSONAL HISTORY OF OTHER (HEALED) PHYSICAL INJURY AND TRAUMA: Chronic | ICD-10-CM

## 2020-02-10 DIAGNOSIS — Z85.828 PERSONAL HISTORY OF OTHER MALIGNANT NEOPLASM OF SKIN: Chronic | ICD-10-CM

## 2020-02-10 PROCEDURE — 88305 TISSUE EXAM BY PATHOLOGIST: CPT | Mod: 26

## 2020-02-10 PROCEDURE — 88312 SPECIAL STAINS GROUP 1: CPT | Mod: 26

## 2020-02-10 NOTE — ASU DISCHARGE PLAN (ADULT/PEDIATRIC) - CALL YOUR DOCTOR IF YOU HAVE ANY OF THE FOLLOWING:
Excessive diarrhea/Pain not relieved by Medications/Inability to tolerate liquids or foods/Nausea and vomiting that does not stop/Fever greater than (need to indicate Fahrenheit or Celsius)/Bleeding that does not stop

## 2020-02-10 NOTE — H&P PST ADULT - HISTORY OF PRESENT ILLNESS
79 year male patient with history of CVA on aspirin and Plavix , stopped Plavix 5 days ago presented for EGD. Patient has history of worsening esophageal dysphagia of several months.

## 2020-02-10 NOTE — H&P PST ADULT - NSICDXPASTMEDICALHX_GEN_ALL_CORE_FT
PAST MEDICAL HISTORY:  Anemia     Benign prostate hyperplasia     CVA (cerebral vascular accident)     Depression     Gastroesophageal reflux disease, esophagitis presence not specified     Hepatitis C virus infection cured after antiviral drug therapy     HTN (hypertension)     Hypercholesteremia     Other depression     Skin cancer left ear    Spleen injury

## 2020-02-10 NOTE — ASU PATIENT PROFILE, ADULT - PMH
Benign prostate hyperplasia    Depression    Gastroesophageal reflux disease, esophagitis presence not specified    Hepatitis C virus infection cured after antiviral drug therapy    Hypercholesteremia    Other depression    Skin cancer  left ear  Spleen injury Benign prostate hyperplasia    CVA (cerebral vascular accident)    Depression    Gastroesophageal reflux disease, esophagitis presence not specified    Hepatitis C virus infection cured after antiviral drug therapy    Hypercholesteremia    Other depression    Skin cancer  left ear  Spleen injury Anemia    Benign prostate hyperplasia    CVA (cerebral vascular accident)    Depression    Gastroesophageal reflux disease, esophagitis presence not specified    Hepatitis C virus infection cured after antiviral drug therapy    HTN (hypertension)    Hypercholesteremia    Other depression    Skin cancer  left ear  Spleen injury

## 2020-02-10 NOTE — ASU PATIENT PROFILE, ADULT - VISION (WITH CORRECTIVE LENSES IF THE PATIENT USUALLY WEARS THEM):
Partially impaired: cannot see medication labels or newsprint, but can see obstacles in path, and the surrounding layout; can count fingers at arm's length/Reading glasses Reading glasses/Normal vision: sees adequately in most situations; can see medication labels, newsprint

## 2020-02-11 LAB — SURGICAL PATHOLOGY STUDY: SIGNIFICANT CHANGE UP

## 2020-02-12 DIAGNOSIS — E78.00 PURE HYPERCHOLESTEROLEMIA, UNSPECIFIED: ICD-10-CM

## 2020-02-12 DIAGNOSIS — K22.2 ESOPHAGEAL OBSTRUCTION: ICD-10-CM

## 2020-02-12 DIAGNOSIS — I10 ESSENTIAL (PRIMARY) HYPERTENSION: ICD-10-CM

## 2020-02-12 DIAGNOSIS — Z86.19 PERSONAL HISTORY OF OTHER INFECTIOUS AND PARASITIC DISEASES: ICD-10-CM

## 2020-02-12 DIAGNOSIS — R13.10 DYSPHAGIA, UNSPECIFIED: ICD-10-CM

## 2020-02-12 DIAGNOSIS — Z86.73 PERSONAL HISTORY OF TRANSIENT ISCHEMIC ATTACK (TIA), AND CEREBRAL INFARCTION WITHOUT RESIDUAL DEFICITS: ICD-10-CM

## 2020-02-12 DIAGNOSIS — K21.0 GASTRO-ESOPHAGEAL REFLUX DISEASE WITH ESOPHAGITIS: ICD-10-CM

## 2020-02-12 DIAGNOSIS — Z85.828 PERSONAL HISTORY OF OTHER MALIGNANT NEOPLASM OF SKIN: ICD-10-CM

## 2020-02-12 DIAGNOSIS — N40.0 BENIGN PROSTATIC HYPERPLASIA WITHOUT LOWER URINARY TRACT SYMPTOMS: ICD-10-CM

## 2020-02-12 DIAGNOSIS — F32.9 MAJOR DEPRESSIVE DISORDER, SINGLE EPISODE, UNSPECIFIED: ICD-10-CM

## 2020-02-12 DIAGNOSIS — K29.50 UNSPECIFIED CHRONIC GASTRITIS WITHOUT BLEEDING: ICD-10-CM

## 2020-02-12 DIAGNOSIS — D64.9 ANEMIA, UNSPECIFIED: ICD-10-CM

## 2020-08-26 PROBLEM — I10 ESSENTIAL (PRIMARY) HYPERTENSION: Chronic | Status: ACTIVE | Noted: 2020-02-10

## 2020-08-26 PROBLEM — D64.9 ANEMIA, UNSPECIFIED: Chronic | Status: ACTIVE | Noted: 2020-02-10

## 2020-08-26 PROBLEM — I63.9 CEREBRAL INFARCTION, UNSPECIFIED: Chronic | Status: ACTIVE | Noted: 2020-02-10

## 2020-10-22 ENCOUNTER — APPOINTMENT (OUTPATIENT)
Dept: OTOLARYNGOLOGY | Facility: CLINIC | Age: 80
End: 2020-10-22
Payer: MEDICARE

## 2020-10-22 VITALS — HEIGHT: 72 IN | WEIGHT: 177 LBS | BODY MASS INDEX: 23.98 KG/M2

## 2020-10-22 DIAGNOSIS — K21.9 GASTRO-ESOPHAGEAL REFLUX DISEASE W/OUT ESOPHAGITIS: ICD-10-CM

## 2020-10-22 PROCEDURE — 31575 DIAGNOSTIC LARYNGOSCOPY: CPT

## 2020-10-22 PROCEDURE — 99204 OFFICE O/P NEW MOD 45 MIN: CPT | Mod: 25

## 2020-10-22 NOTE — CONSULT LETTER
[Dear  ___] : Dear  [unfilled], [Consult Letter:] : I had the pleasure of evaluating your patient, [unfilled]. [Please see my note below.] : Please see my note below. [Consult Closing:] : Thank you very much for allowing me to participate in the care of this patient.  If you have any questions, please do not hesitate to contact me. [Sincerely,] : Sincerely, [FreeTextEntry2] : Kwaku Martinez MD [FreeTextEntry3] : Yolanda Beyer MD\par Otolaryngology - Head & Neck Surgery\par

## 2020-10-22 NOTE — ASSESSMENT
[FreeTextEntry1] : - cerumen removed\par - f/up with audiogram\par - Acid reflux/laryngopharyngeal reflux\par - PPI daily, reviewed administration: take 30 minutes before eating, on an empty stomach, daily\par - reflux handout provided and reviewed for reflux precautions\par - VNG\par

## 2020-10-22 NOTE — HISTORY OF PRESENT ILLNESS
[de-identified] : Patient presents today with c/o dysphagia. Patient states it has been going on since May 2019 after he suffered a stroke. Patient admits dysphagia with foods and liquids. He went for therapy due to stoke but told to be moving his tongue abnormally. Patient admits about 10 lbs since this started. Wife tries to make him eat soft foods and protein shakes. Constantly clearing throat. He had a swallow evaluation which found that showed that he is not properly chewing, moving food side to side and not up and down. Has had EGD in the past which has showed narrowing of esophagus, no strictures. 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 Daughter attributes this radiation to cause constant dryness of mouth and throat. \par \par Patient also c/o hearing loss since ear was removed. Patient had left ear removed due to skin cancer.  Hearing from remaining right ear is decreasing as well. \par Patient suffers from vertigo. When getting up quickly severe room spinning. Suffers from orthostatic hypotension. Daughter admits he has passed out a few times. He also c/o "big head". Pressure in his head at times. He has headaches frequently. Patient also suffers from post nasal drip. Always clearing his throat. He takes Zyrtec OTC when needed. No nasal sprays.

## 2020-11-04 ENCOUNTER — OUTPATIENT (OUTPATIENT)
Dept: OUTPATIENT SERVICES | Facility: HOSPITAL | Age: 80
LOS: 1 days | Discharge: HOME | End: 2020-11-04

## 2020-11-04 DIAGNOSIS — Z87.828 PERSONAL HISTORY OF OTHER (HEALED) PHYSICAL INJURY AND TRAUMA: Chronic | ICD-10-CM

## 2020-11-04 DIAGNOSIS — R42 DIZZINESS AND GIDDINESS: ICD-10-CM

## 2020-11-04 DIAGNOSIS — Z85.828 PERSONAL HISTORY OF OTHER MALIGNANT NEOPLASM OF SKIN: Chronic | ICD-10-CM

## 2020-11-05 ENCOUNTER — APPOINTMENT (OUTPATIENT)
Dept: OTOLARYNGOLOGY | Facility: CLINIC | Age: 80
End: 2020-11-05
Payer: MEDICARE

## 2020-11-05 VITALS — WEIGHT: 176 LBS | BODY MASS INDEX: 23.84 KG/M2 | HEIGHT: 72 IN

## 2020-11-05 DIAGNOSIS — Z87.891 PERSONAL HISTORY OF NICOTINE DEPENDENCE: ICD-10-CM

## 2020-11-05 PROCEDURE — 99214 OFFICE O/P EST MOD 30 MIN: CPT

## 2020-11-05 NOTE — ASSESSMENT
[FreeTextEntry1] : - will proceed with MRI IAC for asymmetric SNHL left worse than right; and central vestibular weakness\par - will refer for vestibular therapy\par - recommend urgent f/up with Dr Lozano, dermatologist for left facial lesion, wife reports he is being closely watched for this\par - clearance for hearing aids\par - f/up in 3 months. Will order repeat CT neck for right level 1b LN at next visit

## 2020-11-05 NOTE — PHYSICAL EXAM
[Normal] : mucosa is normal [Midline] : trachea located in midline position [Hearing Loss Right Only] : normal [Hearing Loss Left Only] : normal [FreeTextEntry7] : s/p auriculectomy, stenotic EAC, EAC clear, TM intact [de-identified] : cutaneous friable lesions on left face x 3, each ~3mm in size

## 2020-11-05 NOTE — HISTORY OF PRESENT ILLNESS
[de-identified] : Patient presents today with c/o dysphagia. Patient states it has been going on since May 2019 after he suffered a stroke. Patient admits dysphagia with foods and liquids. He went for therapy due to stoke but told to be moving his tongue abnormally. Patient admits about 10 lbs since this started. Wife tries to make him eat soft foods and protein shakes. Constantly clearing throat. He had a swallow evaluation which found that showed that he is not properly chewing, moving food side to side and not up and down. Has had EGD in the past which has showed narrowing of esophagus, no strictures. 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 Daughter attributes this radiation to cause constant dryness of mouth and throat. \par \par Patient also c/o hearing loss since ear was removed. Patient had left ear removed due to skin cancer.  Hearing from remaining right ear is decreasing as well. \par Patient suffers from vertigo. When getting up quickly severe room spinning. Suffers from orthostatic hypotension. Daughter admits he has passed out a few times. He also c/o "big head". Pressure in his head at times. He has headaches frequently. Patient also suffers from post nasal drip. Always clearing his throat. He takes Zyrtec OTC when needed. No nasal sprays.  [FreeTextEntry1] : 11/05/2020 : 80 yr old male comes in today to discuss hearing test and balance test on 11/04/2020. He has several cutaneous lesions, hx of cutaneous cancer per wife, is under the care of Dr Lozano and Dr Umana for this.\par \par Of note, ear surgery 12/17/2013, at [facility], with Dr Oviedo, followed by radiation and chemo, was followed by Dr Oviedo afterwards for 1 year.

## 2020-11-05 NOTE — DATA REVIEWED
[de-identified] : outside CT neck with contrast: right level IA LN, 11mm, see attached note\par \par relevant images and reports personally reviewed by me:\par \par EXAM: MR BRAIN \par \par \par PROCEDURE DATE: 06/19/2019 \par \par \par \par \par INTERPRETATION: Clinical History / Reason for exam: Stroke. \par \par Technique: Sagittal and axial T1-weighted images, axial T2-weighted images, \par axial FLAIR images, axial gradient echo images and axial diffusion weighted \par images of the brain were obtained on the 1.5 Pau magnet without \par administration of intravenous contrast. \par \par Comparison: Noncontrast CT head and CT perfusion dated 6/18/2019 \par \par \par Findings: The ventricles, basal cisterns and sulcal pattern are slightly \par prominent consistent with parenchymal volume loss. There is a small 1.0 x \par 0.7 cm focus of signal abnormality in the right posterior centrum semiovale \par which is hyperintense on axial T2-weighted images and FLAIR sequences and \par axial diffusion weighted images with a dropout of signal on ADC map \par consistent with acute white matter lacunar infarct (series 3; image \par #[id number]). \par \par There are scattered patchy and punctate foci of T2 and FLAIR \par hyperintensities in the periventricular and subcortical white matter which \par are nonspecific and without mass effect most likely consistent with chronic \par small vessel ischemic changes in a patient of this stated age. \par \par Small foci of signal abnormality are also noted in the right head of the \par caudate, bilateral basal ganglia and thalami consistent with areas of old \par lacunar infarcts. There is no acute mass effect, midline shift or \par hemorrhage. No extra axial fluid collections are identified. \par \par Expected signal flow voids are noted in the major intracranial vessels \par consistent with their patency. \par \par Globes and orbits are grossly within normal limits. Signal abnormality is \par noted in the left mastoid air complex on axial T2-weighted images which may \par be partially opacified. The paranasal sinuses and right mastoid complex are \par within normal limits. \par \par There is no bone marrow signal abnormality. The sella is unremarkable. \par \par \par IMPRESSION: \par \par 1. Acute lacunar infarct right posterior centrum semiovale. \par \par 2. Periventricular and subcortical white matter chronic small vessel \par ischemic changes and multiple old lacunar infarcts as described above. \par \par 3. Partial opacification left mastoid air complex. \par \par 4. No acute intracranial hemorrhage. \par \par \par Spoke with AIDA AYALAHAMMAD on 6/20/2019 at 9:17 AM with readback. \par \par \par \par \par \par \par \par \par GILBERTO WADE M.D., ATTENDING RADIOLOGIST \par This document has been electronically signed. Jun 20 2019 9:31AM

## 2020-11-15 ENCOUNTER — OUTPATIENT (OUTPATIENT)
Dept: OUTPATIENT SERVICES | Facility: HOSPITAL | Age: 80
LOS: 1 days | Discharge: HOME | End: 2020-11-15
Payer: MEDICARE

## 2020-11-15 ENCOUNTER — RESULT REVIEW (OUTPATIENT)
Age: 80
End: 2020-11-15

## 2020-11-15 DIAGNOSIS — Z87.828 PERSONAL HISTORY OF OTHER (HEALED) PHYSICAL INJURY AND TRAUMA: Chronic | ICD-10-CM

## 2020-11-15 DIAGNOSIS — Z85.828 PERSONAL HISTORY OF OTHER MALIGNANT NEOPLASM OF SKIN: Chronic | ICD-10-CM

## 2020-11-15 DIAGNOSIS — H90.5 UNSPECIFIED SENSORINEURAL HEARING LOSS: ICD-10-CM

## 2020-11-15 DIAGNOSIS — H81.8X9 OTHER DISORDERS OF VESTIBULAR FUNCTION, UNSPECIFIED EAR: ICD-10-CM

## 2020-11-15 PROCEDURE — 70553 MRI BRAIN STEM W/O & W/DYE: CPT | Mod: 26,76

## 2020-12-21 NOTE — REASON FOR VISIT
Health Maintenance Due   Topic Date Due   • DM/CKD Microalbumin  08/05/2020   • Diabetes A1C  01/31/2021       Patient is up to date, no discussion needed.           [Initial Evaluation] : an initial evaluation for [FreeTextEntry2] : dysphagia / hearing test

## 2021-02-04 ENCOUNTER — APPOINTMENT (OUTPATIENT)
Dept: OTOLARYNGOLOGY | Facility: CLINIC | Age: 81
End: 2021-02-04
Payer: MEDICARE

## 2021-02-04 DIAGNOSIS — R55 SYNCOPE AND COLLAPSE: ICD-10-CM

## 2021-02-04 DIAGNOSIS — R51.9 HEADACHE, UNSPECIFIED: ICD-10-CM

## 2021-02-04 PROCEDURE — 99214 OFFICE O/P EST MOD 30 MIN: CPT | Mod: 25

## 2021-02-04 PROCEDURE — 69210 REMOVE IMPACTED EAR WAX UNI: CPT

## 2021-02-04 NOTE — HISTORY OF PRESENT ILLNESS
[de-identified] : Patient presents today with c/o dysphagia. Patient states it has been going on since May 2019 after he suffered a stroke. Patient admits dysphagia with foods and liquids. He went for therapy due to stoke but told to be moving his tongue abnormally. Patient admits about 10 lbs since this started. Wife tries to make him eat soft foods and protein shakes. Constantly clearing throat. He had a swallow evaluation which found that showed that he is not properly chewing, moving food side to side and not up and down. Has had EGD in the past which has showed narrowing of esophagus, no strictures. 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 Daughter attributes this radiation to cause constant dryness of mouth and throat. \par \par Patient also c/o hearing loss since ear was removed. Patient had left ear removed due to skin cancer.  Hearing from remaining right ear is decreasing as well. \par Patient suffers from vertigo. When getting up quickly severe room spinning. Suffers from orthostatic hypotension. Daughter admits he has passed out a few times. He also c/o "big head". Pressure in his head at times. He has headaches frequently. Patient also suffers from post nasal drip. Always clearing his throat. He takes Zyrtec OTC when needed. No nasal sprays. \par \par 11/05/2020 : 80 yr old male comes in today to discuss hearing test and balance test on 11/04/2020. He has several cutaneous lesions, hx of cutaneous cancer per wife, is under the care of Dr Lozano and Dr Umana for this.\par \par Of note, ear surgery 12/17/2013, at [facility], with Dr Oviedo, followed by radiation and chemo, was followed by Dr Oviedo afterwards for 1 year.   [FreeTextEntry1] : \par 2/4/21: Patient presents today following up on asymmetric hearing loss. Patient had recent MRI of IAC 11/15/20. Patient denies any new changes since last visit. 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 was not able to get vestibular therapy done.

## 2021-02-04 NOTE — PHYSICAL EXAM
[Midline] : trachea located in midline position [Normal] : no rashes [de-identified] : right level Ib palpable LN, ~1cm, mobile, non-tender [de-identified] : left ear s/p auriculectomy, left ear canal cerumen impaction [de-identified] : see above

## 2021-02-04 NOTE — ASSESSMENT
[FreeTextEntry1] : - referral again for vestibular therapy\par - neurologist referral\par - CT neck with contrast ordered to f/up right neck level IB LN\par - f/up in 2 months

## 2021-02-04 NOTE — REASON FOR VISIT
[Subsequent Evaluation] : a subsequent evaluation for [FreeTextEntry2] : hearing loss, s/p ear surgery

## 2021-02-04 NOTE — DATA REVIEWED
[de-identified] : relevant images and reports personally reviewed by me:\par EXAM:  MR IAC ONLY WAW IC\par EXAM:  MR BRAIN WAW IC\par \par \par PROCEDURE DATE:  11/15/2020\par \par \par \par \par INTERPRETATION:  INDICATION: Asymmetric sensorineural hearing loss. Central vertigo.\par \par TECHNIQUE:  Brain and IAC MR with and without contrast.\par \par Sagittal T1, axial T2, FLAIR, gradient-echo, and diffusion-weighted imaging of the brain is obtained followed by thin section pre- and post-contrast axial and coronal T1 imaging through the internal auditory canal and additional axial FIESTA sequence through the brain stem. Post-contrast axial, coronal, and sagittal T1 images are also obtained.\par \par 8 mls of Gadavist was administered intravenously without complication and 2 mls were discarded.\par \par COMPARISON: MR brain dated 6/19/2019.\par \par FINDINGS:\par \par The bilateral cranial nerves VII and VIII and internal auditory meati  demonstrate unremarkable signal intensity and morphology. There are no enhancing mass lesions within the bilateral cerebellopontine angles. There is no evidence for any abnormal enhancement within the membranous labyrinth.\par \par There is patchy opacification of the left mastoid air cells.\par \par The bilateral sulci and ventricular volumes are appropriate for age.\par \par There is periventricular and scattered nonenhancing T2 and FLAIR signal hyperintensity consistent with microvascular-type changes.\par \par There is no acute infarct on diffusion weighted sequences. There is no evidence for acute intracranial hemorrhage. There is no mass effect or midline shift.\par \par There are chronic lacunar infarcts involving the right lentiform nucleus, right caudate head, right justin and left cerebellar hemisphere.\par \par There is no abnormal intracranial enhancement.\par \par There is no extra-axial fluid collection.\par \par Flow voids are seen within the intracranial arterial vessels and dural sinuses.\par \par The pituitary and posterior fossa demonstrate unremarkable signal intensity and morphology.\par \par The bilateral orbits are unremarkable. There has been bilateral cataract surgery.\par \par Mucosal thickening involving the left sphenoid sinus. The nasal sinuses are otherwise unremarkable\par \par IMPRESSION:\par \par No evidence for mass, abnormal signal, or abnormal enhancement involving the cerebellopontine angle cisterns, internal auditory canals, or inner ear.\par \par Patchy opacification of the left mastoid air cells which is unchanged since prior examination.\par \par Chronic lacunar infarcts involving the left cerebellar hemisphere, right justin as well as right basal ganglia.\par \par \par \par \par \par \par VINNIE FUNK M.D., ATTENDING RADIOLOGIST\par This document has been electronically signed. Nov 16 2020 10:44AM\par  \par  [id number]

## 2021-02-22 ENCOUNTER — OUTPATIENT (OUTPATIENT)
Dept: OUTPATIENT SERVICES | Facility: HOSPITAL | Age: 81
LOS: 1 days | Discharge: HOME | End: 2021-02-22
Payer: MEDICARE

## 2021-02-22 DIAGNOSIS — Z87.891 PERSONAL HISTORY OF NICOTINE DEPENDENCE: ICD-10-CM

## 2021-02-22 DIAGNOSIS — Z87.828 PERSONAL HISTORY OF OTHER (HEALED) PHYSICAL INJURY AND TRAUMA: Chronic | ICD-10-CM

## 2021-02-22 DIAGNOSIS — Z85.828 PERSONAL HISTORY OF OTHER MALIGNANT NEOPLASM OF SKIN: Chronic | ICD-10-CM

## 2021-02-22 PROCEDURE — 70491 CT SOFT TISSUE NECK W/DYE: CPT | Mod: 26

## 2021-04-01 ENCOUNTER — APPOINTMENT (OUTPATIENT)
Dept: OTOLARYNGOLOGY | Facility: CLINIC | Age: 81
End: 2021-04-01
Payer: MEDICARE

## 2021-04-01 DIAGNOSIS — H81.8X9 OTHER DISORDERS OF VESTIBULAR FUNCTION, UNSPECIFIED EAR: ICD-10-CM

## 2021-04-01 DIAGNOSIS — H61.22 IMPACTED CERUMEN, LEFT EAR: ICD-10-CM

## 2021-04-01 PROCEDURE — 99213 OFFICE O/P EST LOW 20 MIN: CPT | Mod: 25

## 2021-04-01 PROCEDURE — 69210 REMOVE IMPACTED EAR WAX UNI: CPT

## 2021-04-01 NOTE — REASON FOR VISIT
[Subsequent Evaluation] : a subsequent evaluation for [FreeTextEntry2] : dysphagia, hearing loss, hx left ear cancer

## 2021-04-01 NOTE — HISTORY OF PRESENT ILLNESS
[de-identified] : Patient presents today with c/o dysphagia. Patient states it has been going on since May 2019 after he suffered a stroke. Patient admits dysphagia with foods and liquids. He went for therapy due to stoke but told to be moving his tongue abnormally. Patient admits about 10 lbs since this started. Wife tries to make him eat soft foods and protein shakes. Constantly clearing throat. He had a swallow evaluation which found that showed that he is not properly chewing, moving food side to side and not up and down. Has had EGD in the past which has showed narrowing of esophagus, no strictures. 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 Daughter attributes this radiation to cause constant dryness of mouth and throat. \par \par Patient also c/o hearing loss since ear was removed. Patient had left ear removed due to skin cancer.  Hearing from remaining right ear is decreasing as well. \par Patient suffers from vertigo. When getting up quickly severe room spinning. Suffers from orthostatic hypotension. Daughter admits he has passed out a few times. He also c/o "big head". Pressure in his head at times. He has headaches frequently. Patient also suffers from post nasal drip. Always clearing his throat. He takes Zyrtec OTC when needed. No nasal sprays. \par \par 11/05/2020 : 80 yr old male comes in today to discuss hearing test and balance test on 11/04/2020. He has several cutaneous lesions, hx of cutaneous cancer per wife, is under the care of Dr Lozano and Dr Umana for this.\par \par Of note, ear surgery 12/17/2013, at [facility], with Dr Oviedo, followed by radiation and chemo, was followed by Dr Oviedo afterwards for 1 year.  \par \par \par 2/4/21: Patient presents today following up on asymmetric hearing loss. Patient had recent MRI of IAC 11/15/20. Patient denies any new changes since last visit. 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 was not able to get vestibular therapy done.  [FreeTextEntry1] : \par 4/1/21: Patient presents today following up on dysphagia, hearing loss, vestibular dysfunction, hx left ear cancer. Starting SLP swallow therapy on Monday. Denies pain. Report he is eating slower and taking omeprazole daily. Reports improvement since last visit. Has started vestibular therapy. He has profound daytime fatigue, 2-3 hour long nap during the day.

## 2021-04-01 NOTE — PHYSICAL EXAM
[Midline] : trachea located in midline position [Normal] : no rashes [de-identified] : right level Ib palpable LN, 1-2 cm, mobile, non-tender [de-identified] : left ear s/p auriculectomy, left ear canal cerumen impaction [de-identified] : see above

## 2021-04-01 NOTE — ASSESSMENT
[FreeTextEntry1] : - will proceed with sleep study for SDB\par - continue vestibular therapy\par - f/up with SLP for swallow therapy\par - f/up in 1-2 months

## 2021-04-01 NOTE — DATA REVIEWED
[de-identified] : relevant images and reports personally reviewed by me:[place] DOLL 11/4/20: peripheral and central vestibular dysfunction

## 2021-04-15 ENCOUNTER — OUTPATIENT (OUTPATIENT)
Dept: OUTPATIENT SERVICES | Facility: HOSPITAL | Age: 81
LOS: 1 days | Discharge: HOME | End: 2021-04-15
Payer: MEDICARE

## 2021-04-15 DIAGNOSIS — Z87.828 PERSONAL HISTORY OF OTHER (HEALED) PHYSICAL INJURY AND TRAUMA: Chronic | ICD-10-CM

## 2021-04-15 DIAGNOSIS — Z85.828 PERSONAL HISTORY OF OTHER MALIGNANT NEOPLASM OF SKIN: Chronic | ICD-10-CM

## 2021-04-15 PROCEDURE — 95810 POLYSOM 6/> YRS 4/> PARAM: CPT | Mod: 26

## 2021-04-16 DIAGNOSIS — G47.33 OBSTRUCTIVE SLEEP APNEA (ADULT) (PEDIATRIC): ICD-10-CM

## 2021-05-20 ENCOUNTER — APPOINTMENT (OUTPATIENT)
Dept: OTOLARYNGOLOGY | Facility: CLINIC | Age: 81
End: 2021-05-20
Payer: MEDICARE

## 2021-05-20 DIAGNOSIS — G47.30 SLEEP APNEA, UNSPECIFIED: ICD-10-CM

## 2021-05-20 DIAGNOSIS — R42 DIZZINESS AND GIDDINESS: ICD-10-CM

## 2021-05-20 DIAGNOSIS — G47.33 OBSTRUCTIVE SLEEP APNEA (ADULT) (PEDIATRIC): ICD-10-CM

## 2021-05-20 DIAGNOSIS — R13.12 DYSPHAGIA, OROPHARYNGEAL PHASE: ICD-10-CM

## 2021-05-20 PROCEDURE — 99213 OFFICE O/P EST LOW 20 MIN: CPT

## 2021-05-20 NOTE — REASON FOR VISIT
[Subsequent Evaluation] : a subsequent evaluation for [FreeTextEntry2] : oropharyngeal dysphagia, sleep disorder, s/p ear surgery

## 2021-05-20 NOTE — PHYSICAL EXAM
[de-identified] : left ear s/p auriculectomy, left ear canal clear [Midline] : trachea located in midline position [Normal] : no rashes [de-identified] : see above

## 2021-05-20 NOTE — ASSESSMENT
[FreeTextEntry1] : - continue SLP\par - continue vestibular therapy\par - CPAP titration, reviewed PSG, mild JEFFREY\par - f/up in 2-3 months\par - referral to Pulm for CPAP

## 2021-05-20 NOTE — HISTORY OF PRESENT ILLNESS
[de-identified] : Patient presents today with c/o dysphagia. Patient states it has been going on since May 2019 after he suffered a stroke. Patient admits dysphagia with foods and liquids. He went for therapy due to stoke but told to be moving his tongue abnormally. Patient admits about 10 lbs since this started. Wife tries to make him eat soft foods and protein shakes. Constantly clearing throat. He had a swallow evaluation which found that showed that he is not properly chewing, moving food side to side and not up and down. Has had EGD in the past which has showed narrowing of esophagus, no strictures. 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 Daughter attributes this radiation to cause constant dryness of mouth and throat. \par \par Patient also c/o hearing loss since ear was removed. Patient had left ear removed due to skin cancer.  Hearing from remaining right ear is decreasing as well. Hx head and neck radiation due to cutaneous malignancy, ?stage.\par Patient suffers from vertigo. When getting up quickly severe room spinning. Suffers from orthostatic hypotension. Daughter admits he has passed out a few times. He also c/o "big head". Pressure in his head at times. He has headaches frequently. Patient also suffers from post nasal drip. Always clearing his throat. He takes Zyrtec OTC when needed. No nasal sprays. \par \par 11/05/2020 : 80 yr old male comes in today to discuss hearing test and balance test on 11/04/2020. 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  \par \par \par 2/4/21: Patient presents today following up on asymmetric hearing loss. Patient had recent MRI of IAC 11/15/20. Patient denies any new changes since last visit. 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 was not able to get vestibular therapy done. \par \par \par 4/1/21: Patient presents today following up on dysphagia, hearing loss, vestibular dysfunction, hx left ear cancer. Starting SLP swallow therapy on Monday. Denies pain. Report he is eating slower and taking omeprazole daily. Reports improvement since last visit. Has started vestibular therapy. He has profound daytime fatigue, 2-3 hour long nap during the day.  [FreeTextEntry1] : \par 5/20/21: Patient presents today following up on oropharyngeal dysphagia, sleep disorder, s/p ear surgery. Patient has been seeing SLP which is helping with his swallowing. He notes eating slower. He had sleep study done here to discuss results. Also doing vestibular therapy.  [FreeTextEntry3] : hx head and neck radiation

## 2021-05-20 NOTE — DATA REVIEWED
[de-identified] : relevant images and reports personally reviewed by me:\par PSG 4/15/21: AHI 11.8

## 2021-06-23 ENCOUNTER — OUTPATIENT (OUTPATIENT)
Dept: OUTPATIENT SERVICES | Facility: HOSPITAL | Age: 81
LOS: 1 days | Discharge: HOME | End: 2021-06-23

## 2021-06-23 DIAGNOSIS — Z85.828 PERSONAL HISTORY OF OTHER MALIGNANT NEOPLASM OF SKIN: Chronic | ICD-10-CM

## 2021-06-23 DIAGNOSIS — R42 DIZZINESS AND GIDDINESS: ICD-10-CM

## 2021-06-23 DIAGNOSIS — Z87.828 PERSONAL HISTORY OF OTHER (HEALED) PHYSICAL INJURY AND TRAUMA: Chronic | ICD-10-CM

## 2022-02-14 ENCOUNTER — RESULT REVIEW (OUTPATIENT)
Age: 82
End: 2022-02-14

## 2022-02-14 ENCOUNTER — TRANSCRIPTION ENCOUNTER (OUTPATIENT)
Age: 82
End: 2022-02-14

## 2022-02-14 ENCOUNTER — OUTPATIENT (OUTPATIENT)
Dept: OUTPATIENT SERVICES | Facility: HOSPITAL | Age: 82
LOS: 1 days | Discharge: HOME | End: 2022-02-14
Payer: MEDICARE

## 2022-02-14 VITALS
SYSTOLIC BLOOD PRESSURE: 157 MMHG | HEART RATE: 71 BPM | TEMPERATURE: 97 F | WEIGHT: 190.04 LBS | HEIGHT: 72 IN | RESPIRATION RATE: 18 BRPM | DIASTOLIC BLOOD PRESSURE: 102 MMHG

## 2022-02-14 VITALS
SYSTOLIC BLOOD PRESSURE: 172 MMHG | RESPIRATION RATE: 18 BRPM | OXYGEN SATURATION: 97 % | HEART RATE: 74 BPM | DIASTOLIC BLOOD PRESSURE: 107 MMHG

## 2022-02-14 DIAGNOSIS — Z87.828 PERSONAL HISTORY OF OTHER (HEALED) PHYSICAL INJURY AND TRAUMA: Chronic | ICD-10-CM

## 2022-02-14 DIAGNOSIS — Z85.828 PERSONAL HISTORY OF OTHER MALIGNANT NEOPLASM OF SKIN: Chronic | ICD-10-CM

## 2022-02-14 PROCEDURE — 88305 TISSUE EXAM BY PATHOLOGIST: CPT | Mod: 26

## 2022-02-14 PROCEDURE — 88312 SPECIAL STAINS GROUP 1: CPT | Mod: 26

## 2022-02-14 NOTE — ASU PATIENT PROFILE, ADULT - FALL HARM RISK - UNIVERSAL INTERVENTIONS
Bed in lowest position, wheels locked, appropriate side rails in place/Call bell, personal items and telephone in reach/Instruct patient to call for assistance before getting out of bed or chair/Non-slip footwear when patient is out of bed/[place] to call system/Physically safe environment - no spills, clutter or unnecessary equipment/Purposeful Proactive Rounding/Room/bathroom lighting operational, light cord in reach

## 2022-02-14 NOTE — ASU DISCHARGE PLAN (ADULT/PEDIATRIC) - NS MD DC FALL RISK RISK
For information on Fall & Injury Prevention, visit: [email or web address] OR  [email or web address] OR  https://www.cdc.gov/steadi/patient.html

## 2022-02-14 NOTE — ASU PATIENT PROFILE, ADULT - TEACHING/LEARNING DEVELOPMENTAL CONSIDERATIONS
Patient qualified for home O2, but delivery service not available at this time.  Discharge cancelled.  Today's discharge summary to be considered progress note.  Discharge with home O2 planned for AM.        This document has been electronically signed by TREVON Crouch on August 10, 2018 9:03 PM       none

## 2022-02-14 NOTE — PRE-ANESTHESIA EVALUATION ADULT - NSANTHOSAYNRD_GEN_A_CORE
denies/No. JEFFREY screening performed.  STOP BANG Legend: 0-2 = LOW Risk; 3-4 = INTERMEDIATE Risk; 5-8 = HIGH Risk

## 2022-02-16 LAB — SURGICAL PATHOLOGY STUDY: SIGNIFICANT CHANGE UP

## 2022-02-18 DIAGNOSIS — K29.50 UNSPECIFIED CHRONIC GASTRITIS WITHOUT BLEEDING: ICD-10-CM

## 2022-02-18 DIAGNOSIS — I10 ESSENTIAL (PRIMARY) HYPERTENSION: ICD-10-CM

## 2022-02-18 DIAGNOSIS — D64.9 ANEMIA, UNSPECIFIED: ICD-10-CM

## 2022-02-18 DIAGNOSIS — R13.10 DYSPHAGIA, UNSPECIFIED: ICD-10-CM

## 2022-02-18 DIAGNOSIS — E78.00 PURE HYPERCHOLESTEROLEMIA, UNSPECIFIED: ICD-10-CM

## 2022-02-18 DIAGNOSIS — Z79.82 LONG TERM (CURRENT) USE OF ASPIRIN: ICD-10-CM

## 2022-02-18 DIAGNOSIS — F32.9 MAJOR DEPRESSIVE DISORDER, SINGLE EPISODE, UNSPECIFIED: ICD-10-CM

## 2023-04-08 ENCOUNTER — EMERGENCY (EMERGENCY)
Facility: HOSPITAL | Age: 83
LOS: 0 days | Discharge: HOME | End: 2023-04-08
Attending: STUDENT IN AN ORGANIZED HEALTH CARE EDUCATION/TRAINING PROGRAM
Payer: MEDICARE

## 2023-04-08 VITALS
RESPIRATION RATE: 20 BRPM | OXYGEN SATURATION: 99 % | SYSTOLIC BLOOD PRESSURE: 127 MMHG | WEIGHT: 175.05 LBS | HEART RATE: 70 BPM | TEMPERATURE: 98 F | DIASTOLIC BLOOD PRESSURE: 69 MMHG

## 2023-04-08 DIAGNOSIS — Z85.828 PERSONAL HISTORY OF OTHER MALIGNANT NEOPLASM OF SKIN: Chronic | ICD-10-CM

## 2023-04-08 DIAGNOSIS — Z87.828 PERSONAL HISTORY OF OTHER (HEALED) PHYSICAL INJURY AND TRAUMA: Chronic | ICD-10-CM

## 2023-04-08 LAB
ALBUMIN SERPL ELPH-MCNC: 3.7 G/DL — SIGNIFICANT CHANGE UP (ref 3.5–5.2)
ALP SERPL-CCNC: 107 U/L — SIGNIFICANT CHANGE UP (ref 30–115)
ALT FLD-CCNC: 11 U/L — SIGNIFICANT CHANGE UP (ref 0–41)
ANION GAP SERPL CALC-SCNC: 7 MMOL/L — SIGNIFICANT CHANGE UP (ref 7–14)
APTT BLD: 34.4 SEC — SIGNIFICANT CHANGE UP (ref 27–39.2)
AST SERPL-CCNC: 22 U/L — SIGNIFICANT CHANGE UP (ref 0–41)
BASOPHILS # BLD AUTO: 0.07 K/UL — SIGNIFICANT CHANGE UP (ref 0–0.2)
BASOPHILS NFR BLD AUTO: 0.8 % — SIGNIFICANT CHANGE UP (ref 0–1)
BILIRUB SERPL-MCNC: 0.7 MG/DL — SIGNIFICANT CHANGE UP (ref 0.2–1.2)
BUN SERPL-MCNC: 11 MG/DL — SIGNIFICANT CHANGE UP (ref 10–20)
CALCIUM SERPL-MCNC: 9.2 MG/DL — SIGNIFICANT CHANGE UP (ref 8.4–10.5)
CHLORIDE SERPL-SCNC: 105 MMOL/L — SIGNIFICANT CHANGE UP (ref 98–110)
CO2 SERPL-SCNC: 33 MMOL/L — HIGH (ref 17–32)
CREAT SERPL-MCNC: 0.9 MG/DL — SIGNIFICANT CHANGE UP (ref 0.7–1.5)
EGFR: 85 ML/MIN/1.73M2 — SIGNIFICANT CHANGE UP
EOSINOPHIL # BLD AUTO: 0.19 K/UL — SIGNIFICANT CHANGE UP (ref 0–0.7)
EOSINOPHIL NFR BLD AUTO: 2.2 % — SIGNIFICANT CHANGE UP (ref 0–8)
GLUCOSE SERPL-MCNC: 92 MG/DL — SIGNIFICANT CHANGE UP (ref 70–99)
HCT VFR BLD CALC: 39.7 % — LOW (ref 42–52)
HGB BLD-MCNC: 13.3 G/DL — LOW (ref 14–18)
IMM GRANULOCYTES NFR BLD AUTO: 0.2 % — SIGNIFICANT CHANGE UP (ref 0.1–0.3)
INR BLD: 1.1 RATIO — SIGNIFICANT CHANGE UP (ref 0.65–1.3)
LYMPHOCYTES # BLD AUTO: 2.57 K/UL — SIGNIFICANT CHANGE UP (ref 1.2–3.4)
LYMPHOCYTES # BLD AUTO: 29.6 % — SIGNIFICANT CHANGE UP (ref 20.5–51.1)
MCHC RBC-ENTMCNC: 32.4 PG — HIGH (ref 27–31)
MCHC RBC-ENTMCNC: 33.5 G/DL — SIGNIFICANT CHANGE UP (ref 32–37)
MCV RBC AUTO: 96.8 FL — HIGH (ref 80–94)
MONOCYTES # BLD AUTO: 0.84 K/UL — HIGH (ref 0.1–0.6)
MONOCYTES NFR BLD AUTO: 9.7 % — HIGH (ref 1.7–9.3)
NEUTROPHILS # BLD AUTO: 4.98 K/UL — SIGNIFICANT CHANGE UP (ref 1.4–6.5)
NEUTROPHILS NFR BLD AUTO: 57.5 % — SIGNIFICANT CHANGE UP (ref 42.2–75.2)
NRBC # BLD: 0 /100 WBCS — SIGNIFICANT CHANGE UP (ref 0–0)
PLATELET # BLD AUTO: 174 K/UL — SIGNIFICANT CHANGE UP (ref 130–400)
POTASSIUM SERPL-MCNC: 3.5 MMOL/L — SIGNIFICANT CHANGE UP (ref 3.5–5)
POTASSIUM SERPL-SCNC: 3.5 MMOL/L — SIGNIFICANT CHANGE UP (ref 3.5–5)
PROT SERPL-MCNC: 6.8 G/DL — SIGNIFICANT CHANGE UP (ref 6–8)
PROTHROM AB SERPL-ACNC: 12.6 SEC — SIGNIFICANT CHANGE UP (ref 9.95–12.87)
RBC # BLD: 4.1 M/UL — LOW (ref 4.7–6.1)
RBC # FLD: 15.2 % — HIGH (ref 11.5–14.5)
SODIUM SERPL-SCNC: 145 MMOL/L — SIGNIFICANT CHANGE UP (ref 135–146)
TROPONIN T SERPL-MCNC: <0.01 NG/ML — SIGNIFICANT CHANGE UP
WBC # BLD: 8.67 K/UL — SIGNIFICANT CHANGE UP (ref 4.8–10.8)
WBC # FLD AUTO: 8.67 K/UL — SIGNIFICANT CHANGE UP (ref 4.8–10.8)

## 2023-04-08 PROCEDURE — 93010 ELECTROCARDIOGRAM REPORT: CPT

## 2023-04-08 PROCEDURE — 70498 CT ANGIOGRAPHY NECK: CPT | Mod: 26,MA

## 2023-04-08 PROCEDURE — 82962 GLUCOSE BLOOD TEST: CPT

## 2023-04-08 PROCEDURE — 70498 CT ANGIOGRAPHY NECK: CPT | Mod: MA

## 2023-04-08 PROCEDURE — 70496 CT ANGIOGRAPHY HEAD: CPT | Mod: MA

## 2023-04-08 PROCEDURE — 85610 PROTHROMBIN TIME: CPT

## 2023-04-08 PROCEDURE — 93005 ELECTROCARDIOGRAM TRACING: CPT

## 2023-04-08 PROCEDURE — 70450 CT HEAD/BRAIN W/O DYE: CPT | Mod: MA,XU

## 2023-04-08 PROCEDURE — 99285 EMERGENCY DEPT VISIT HI MDM: CPT

## 2023-04-08 PROCEDURE — 85025 COMPLETE CBC W/AUTO DIFF WBC: CPT

## 2023-04-08 PROCEDURE — 80053 COMPREHEN METABOLIC PANEL: CPT

## 2023-04-08 PROCEDURE — 70496 CT ANGIOGRAPHY HEAD: CPT | Mod: 26,MA

## 2023-04-08 PROCEDURE — 85730 THROMBOPLASTIN TIME PARTIAL: CPT

## 2023-04-08 PROCEDURE — 99285 EMERGENCY DEPT VISIT HI MDM: CPT | Mod: 25

## 2023-04-08 PROCEDURE — 36415 COLL VENOUS BLD VENIPUNCTURE: CPT

## 2023-04-08 PROCEDURE — 84484 ASSAY OF TROPONIN QUANT: CPT

## 2023-04-08 NOTE — ED PROVIDER NOTE - PHYSICAL EXAMINATION
VITAL SIGNS: I have reviewed nursing notes and confirm.  CONSTITUTIONAL: wdwn male in stretcher in NAD  SKIN: Warm dry, normal skin turgor  HEAD: NCAT  EYES: EOMI, PERRL, no scleral icterus  ENT: Moist mucous membranes, normal pharynx with no erythema or exudates  NECK: Supple; non tender. Full ROM.  CARD: RRR, no murmurs, rubs or gallops  RESP: clear to ausculation b/l.  No rales, rhonchi, or wheezing.  ABD: soft, non-tender, non-distended, no rebound or guarding.   EXT: Full ROM, no bony tenderness, no pedal edema, no calf tenderness  NEURO: Awake, alert, oriented. PERRL, EOMI, V1-V3 intact bl, +R facial droop at baseline per pt and wife, +bl shoulder shrug, 5/5 strength/sensory bl UE/LEs, no pronator drift, normal finger to nose. Mild slurring of speech at baseline as per pt/wife.  PSYCH: Cooperative, appropriate.

## 2023-04-08 NOTE — ED PROVIDER NOTE - PROGRESS NOTE DETAILS
Lucio: all results reviewed with patient and wife at bedside. Patient still with mild slurring of speech but states this has been ongoing for over 2 days. Offered OBS for further workup and imaging with neurology however patient declined, states he would prefer to f/u with neurology and vascular surgery outpatient. Pt and wife aware of findings of ICA/PCA stenosis.  Patient to be discharged from ED. Any available test results were discussed with patient and/or family. Verbal instructions given, including instructions to return to ED immediately for any new, worsening, or concerning symptoms. Patient endorsed understanding. Written discharge instructions additionally given, including follow-up plan.

## 2023-04-08 NOTE — ED PROVIDER NOTE - OBJECTIVE STATEMENT
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 Wife reports that patient has slurred speech at baseline however is unsure if it has worsened over the past 2 days. No recent falls/trauma. Patient has been taking Benadryl for headache at home which makes him drowsy, which wife notes also causes his speech to slur at times. Denies chest pain, shortness of breath, abd pain, nausea, vomiting, fever/chills, diarrhea, numbness, tingling, focal weakness.

## 2023-04-08 NOTE — ED PROVIDER NOTE - PATIENT PORTAL LINK FT
You can access the FollowMyHealth Patient Portal offered by [facility] by registering at the following website: [email or web address]. By joining [facility]’s FollowMyHealth portal, you will also be able to view your health information using other applications (apps) compatible with our system.

## 2023-04-08 NOTE — ED PROVIDER NOTE - PROVIDER TOKENS
PROVIDER:[TOKEN:[[id number]],FOLLOWUP:[1-3 Days]],PROVIDER:[TOKEN:[[id number]]],PROVIDER:[TOKEN:[[id number]]]

## 2023-04-08 NOTE — ED PROVIDER NOTE - CARE PROVIDER_API CALL
Nitin Mak)  EEGEpilepsy; Neurology  [address], [address]  [place] [address]  Phone: [phone]  Fax: [phone]  Follow Up Time: 1-3 Days    Abdelrahman Hedrick)  Cardiovascular Disease; Internal Medicine  [address]  [place] [address]  Phone: [phone]  Fax: [phone]  Follow Up Time:     Bekah Power)  Surgery; Vascular Surgery  [address] [place] [address]  Phone: [phone]  Fax: [phone]  Follow Up Time:

## 2023-04-08 NOTE — ED PROVIDER NOTE - CLINICAL SUMMARY MEDICAL DECISION MAKING FREE TEXT BOX
82-year-old male presented with acute on chronic slurred speech. Labs and EKG were ordered and reviewed.  Imaging was ordered and reviewed by me. Patient's records (prior hospital, ED visit, and/or nursing home notes if available) were reviewed.  Additional history was obtained from EMS, family, and/or PCP (where available). Escalation to admission/observation was considered. However patient feels much better and is comfortable with discharge. Patient offered ED observation for neurology consult as well as MRI, shared decision making had with patient and wife, because there were no acute findings on imaging done today would prefer to follow-up with outpatient neurology and vascular. Appropriate follow-up was arranged.

## 2023-04-08 NOTE — ED ADULT TRIAGE NOTE - CHIEF COMPLAINT QUOTE
sent in by PMD as per wife pt has had a delay in his expressive speech x 2 days hx of TIAs in the past  pt on plavix /pt alert and oriented times four - pronator drift

## 2023-04-08 NOTE — ED PROVIDER NOTE - NSPTACCESSSVCSAPPTDETAILS_ED_ALL_ED_FT
please give rapid follow up with: vascular surgery, cardiology, and neurology  patient seen to have left internal carotid artery stenosis and right posterior cerebral artery stenosis on CT

## 2023-04-08 NOTE — ED PROVIDER NOTE - ATTENDING CONTRIBUTION TO CARE
82-year-old male past medical history hypertension, hyperlipidemia, CVA with residual aphasia and right facial droop with drooling, on Plavix, anemia presents to the emergency department for evaluation of slurred speech.  Patient's wife reports patient at baseline has slurred speech but she is not sure if it is worsened over the past 2 days.  No recent falls or trauma.  Patient had a mild headache for the last few days so he was taking Benadryl at home and that causes speech to sound more slurred than baseline so wife became concerned.  No nausea, fevers, vomiting, neck stiffness, abdominal pain.    Vital Signs: I have reviewed the initial vital signs.  Constitutional: Patient in no acute distress.  Integumentary: No rash.  ENT: MMM  NECK: Supple.   Cardiovascular: RRR, radial pulses 2/4 bilaterally.   Respiratory: Breath sounds CTA b/l, no wheezing or crackles, good air exchange, good resp effort and excursion, no accessory muscle use, no stridor.  Gastrointestinal: Abdomen soft, non-tender, non-distended, no rebound tenderness or guarding, no CVAT.   Musculoskeletal: FROM, no edema, no calf pain/swelling/erythema.  Neurologic: AAOx3, motor 5/5 and sensation intact throughout upper and lower ext, +R facial droop baseline per wife, +slurred speech (currently at baseline per wife), no ataxia.

## 2023-04-08 NOTE — ED PROVIDER NOTE - NSFOLLOWUPINSTRUCTIONS_ED_ALL_ED_FT
Please follow up with Neurology and Vascular Surgery services for your CT findings.  A copy of your results is attached.  Continue to take all your medications as prescribed.    Return to the Emergency Department if your symptoms worsen or new concerning symptoms develop.    You have narrowed carotid artery which slows the blood to the brain due to blockage in the vessel. It can lead to symptoms of lightheadness and even lead to stroke. Please control your cholesterol and maintain your blood pressure. Please follow up with your neurologist, vascular surgeon, or cardiologist in 1-2 weeks of discharge.

## 2023-04-08 NOTE — ED PROVIDER NOTE - CARE PROVIDERS DIRECT ADDRESSES
,[email or web address]

## 2023-04-10 ENCOUNTER — APPOINTMENT (OUTPATIENT)
Dept: GASTROENTEROLOGY | Facility: CLINIC | Age: 83
End: 2023-04-10
Payer: MEDICARE

## 2023-04-10 VITALS — BODY MASS INDEX: 23.03 KG/M2 | WEIGHT: 170 LBS | HEIGHT: 72 IN

## 2023-04-10 PROCEDURE — 99204 OFFICE O/P NEW MOD 45 MIN: CPT

## 2023-04-10 RX ORDER — PANTOPRAZOLE 40 MG/1
40 TABLET, DELAYED RELEASE ORAL
Qty: 90 | Refills: 3 | Status: DISCONTINUED | COMMUNITY
Start: 2020-10-22 | End: 2023-04-10

## 2023-04-10 NOTE — ASSESSMENT
[FreeTextEntry1] : 82-year-old male with esophageal and transfer dysphagia\par Vascular disease with carotid stenosis history of recurrent CVAs on Plavix\par Significant cardiac history\par \par Plan\par We will obtain cardiovascular evaluation risk ratification with guidance on the possibility of holding antiplatelet therapy/anticoagulation prior to a possible endoscopy with possible dilation\par Follow-up in 3 months with recommendations from above-mentioned subspecialists\par May consider barium esophagram for further evaluation prior to any possible dilation.\par May also consider neuro evaluation to weigh in on the possibility of and neurologic etiology for the dysphagia.  If dysphagia is primarily due to central cause then a dilation would not be of significant value.\par

## 2023-04-10 NOTE — HISTORY OF PRESENT ILLNESS
[FreeTextEntry1] : 82-year-old male history of ENT cancer status posttreatment and radiation with subsequent esophageal dysphagia status post multiple EGDs with bougie dilation in the past with reported adequate response presenting for initial visit.  Patient also has a history of CVA which may be contributing to his dysphagia.  Currently on Plavix.  Patient is scheduled for a cardiology evaluation to be subsequently followed by a vascular evaluation.\par Patient is described as choking on food associated with significant weight loss.

## 2023-04-10 NOTE — PHYSICAL EXAM
[Alert] : alert [Normal Voice/Communication] : normal voice/communication [Healthy Appearing] : healthy appearing [No Acute Distress] : no acute distress [Sclera] : the sclera and conjunctiva were normal [No Respiratory Distress] : no respiratory distress [No Acc Muscle Use] : no accessory muscle use [Respiration, Rhythm And Depth] : normal respiratory rhythm and effort [Auscultation Breath Sounds / Voice Sounds] : lungs were clear to auscultation bilaterally [Heart Rate And Rhythm] : heart rate was normal and rhythm regular [Normal S1, S2] : normal S1 and S2 [Murmurs] : no murmurs [Bowel Sounds] : normal bowel sounds [Abdomen Tenderness] : non-tender [No Masses] : no abdominal mass palpated [Abdomen Soft] : soft [] : no hepatosplenomegaly [Oriented To Time, Place, And Person] : oriented to person, place, and time [de-identified] : Abnormal Hearing [de-identified] : Abnormal neck appearance

## 2023-05-08 ENCOUNTER — APPOINTMENT (OUTPATIENT)
Dept: OTOLARYNGOLOGY | Facility: CLINIC | Age: 83
End: 2023-05-08
Payer: MEDICARE

## 2023-05-08 ENCOUNTER — RX RENEWAL (OUTPATIENT)
Age: 83
End: 2023-05-08

## 2023-05-08 VITALS — HEIGHT: 72 IN | WEIGHT: 175 LBS | BODY MASS INDEX: 23.7 KG/M2

## 2023-05-08 DIAGNOSIS — C44.90 UNSPECIFIED MALIGNANT NEOPLASM OF SKIN, UNSPECIFIED: ICD-10-CM

## 2023-05-08 DIAGNOSIS — Z98.890 OTHER SPECIFIED POSTPROCEDURAL STATES: ICD-10-CM

## 2023-05-08 DIAGNOSIS — I63.9 CEREBRAL INFARCTION, UNSPECIFIED: ICD-10-CM

## 2023-05-08 PROCEDURE — 69210 REMOVE IMPACTED EAR WAX UNI: CPT

## 2023-05-08 PROCEDURE — 99214 OFFICE O/P EST MOD 30 MIN: CPT | Mod: 25

## 2023-05-08 RX ORDER — LEVOCETIRIZINE DIHYDROCHLORIDE 5 MG/1
5 TABLET ORAL DAILY
Qty: 1 | Refills: 3 | Status: ACTIVE | COMMUNITY
Start: 2023-05-08 | End: 1900-01-01

## 2023-05-08 RX ORDER — ASPIRIN 81 MG
6.5 TABLET, DELAYED RELEASE (ENTERIC COATED) ORAL
Qty: 1 | Refills: 6 | Status: ACTIVE | COMMUNITY
Start: 2023-05-08 | End: 1900-01-01

## 2023-05-08 NOTE — PHYSICAL EXAM
[de-identified] : no left auricle , cerumen b/l  [Normal] : mucosa is normal [Midline] : trachea located in midline position

## 2023-05-08 NOTE — HISTORY OF PRESENT ILLNESS
[FreeTextEntry1] : Patient returns today c/o clogged ear, throat clearing. Patient is having difficulty hearing, believes it is due to cerumen. No pain or discomfort. No recent audiogram. Constantly clearing throat, has difficulty swallowing due to radiation from prior accident. Also occasionally feels pressure in head

## 2023-05-08 NOTE — REASON FOR VISIT
[Subsequent Evaluation] : a subsequent evaluation for [FreeTextEntry2] : clogged ear, throat clearing

## 2023-05-08 NOTE — PROCEDURE
[Globus] : globus [None] : none [Flexible Endoscope] : examined with the flexible endoscope [Arytenoid Edema ___ /4] : arytenoid edema [unfilled]U/4 [Arytenoid Erythema ___ /4] : arytenoid erythema [unfilled]U/4 [Normal] : posterior cricoid area had healthy pink mucosa in the interarytenoid area and the esophageal inlet [Interarytenoid Edema] : interarytenoid area edematous [de-identified] : lprd

## 2023-05-25 ENCOUNTER — APPOINTMENT (OUTPATIENT)
Dept: OTOLARYNGOLOGY | Facility: CLINIC | Age: 83
End: 2023-05-25
Payer: MEDICARE

## 2023-05-25 DIAGNOSIS — H90.3 SENSORINEURAL HEARING LOSS, BILATERAL: ICD-10-CM

## 2023-05-25 PROCEDURE — 99213 OFFICE O/P EST LOW 20 MIN: CPT | Mod: 25

## 2023-05-25 PROCEDURE — 69210 REMOVE IMPACTED EAR WAX UNI: CPT

## 2023-05-25 PROCEDURE — 31575 DIAGNOSTIC LARYNGOSCOPY: CPT

## 2023-05-25 NOTE — PHYSICAL EXAM
[Normal] : mucosa is normal [Midline] : trachea located in midline position [de-identified] : no left auricle , cerumen b/l

## 2023-05-25 NOTE — PROCEDURE
[None] : none [Flexible Endoscope] : examined with the flexible endoscope [True Vocal Cords Erythematous] : bilateral true vocal cord edema [Glottis Arytenoid Cartilages Erythema] : bilateral arytenoid ~M erythema [Normal] : posterior cricoid area had healthy pink mucosa in the interarytenoid area and the esophageal inlet [de-identified] : lprd

## 2023-05-25 NOTE — REASON FOR VISIT
[Subsequent Evaluation] : a subsequent evaluation for [FreeTextEntry2] : LPRD,  throat clearing , SNHL

## 2023-05-25 NOTE — HISTORY OF PRESENT ILLNESS
[FreeTextEntry1] : PAtient returns today c/o LPRD,  throat clearing and  SNHL.  Has been using  Debrox  since last visit .  He has been taking famotidine ,  has  seen improvement able to ear better while on it .

## 2023-07-17 ENCOUNTER — APPOINTMENT (OUTPATIENT)
Dept: GASTROENTEROLOGY | Facility: CLINIC | Age: 83
End: 2023-07-17
Payer: MEDICARE

## 2023-07-17 VITALS
SYSTOLIC BLOOD PRESSURE: 114 MMHG | BODY MASS INDEX: 22.75 KG/M2 | DIASTOLIC BLOOD PRESSURE: 66 MMHG | WEIGHT: 168 LBS | HEIGHT: 72 IN | HEART RATE: 80 BPM

## 2023-07-17 DIAGNOSIS — Z87.891 PERSONAL HISTORY OF NICOTINE DEPENDENCE: ICD-10-CM

## 2023-07-17 PROCEDURE — 99214 OFFICE O/P EST MOD 30 MIN: CPT

## 2023-07-17 NOTE — PHYSICAL EXAM
[Alert] : alert [Normal Voice/Communication] : normal voice/communication [No Acute Distress] : no acute distress [Well Developed] : well developed [Well Nourished] : well nourished [Sclera] : the sclera and conjunctiva were normal [Normal Lips/Gums] : the lips and gums were normal [Oropharynx] : the oropharynx was normal [Hearing Loss Right Only] : diminished [Hearing Loss Left Only] : dimished [None] : no edema [Normal] : normal bowel sounds, non-tender, no masses, soft, no no hepato-splenomegaly [Abnormal Walk] : normal gait [Normal Color / Pigmentation] : normal skin color and pigmentation [Oriented To Time, Place, And Person] : oriented to person, place, and time

## 2023-07-18 NOTE — REVIEW OF SYSTEMS
[Recent Weight Loss (___ Lbs)] : recent [unfilled] ~Ulb weight loss [As Noted in HPI] : as noted in HPI [Negative] : Heme/Lymph [Fever] : no fever [Chills] : no chills [Feeling Poorly] : not feeling poorly [Feeling Tired] : not feeling tired [Recent Weight Gain (___ Lbs)] : no recent weight gain [FreeTextEntry4] : dysphagia

## 2023-07-18 NOTE — HISTORY OF PRESENT ILLNESS
[FreeTextEntry1] : 82-year-old male with esophageal and transfer dysphagia for F/U today. He is still having dysphagia with solids and liquids. He noted decreased po intake  and he has lost 7 lbs in the past 2 months. He denied odynophagia, abdominal pain, vomiting, fevers, constipation, diarrhea, melena, or blood in the stool. He is here for F/U with Cardiovascular clearance for an EGD with possible dilation, (on Plavix). As per his wife, he received clearance for the EGD procedure by Dr. Enriquez but she was unsure about the Plavix. \par  [de-identified] : 2/2022

## 2023-07-18 NOTE — ASSESSMENT
[FreeTextEntry1] : 82-year-old male with esophageal and transfer dysphagia for F/U today. He is still having dysphagia with solids and liquids. He noted decreased po intake  and he has lost 7 lbs in the past 2 months. He denied odynophagia, abdominal pain, vomiting, fevers, constipation, diarrhea, melena, or blood in the stool. He is here for F/U with Cardiovascular clearance for an EGD with possible dilation.(on Plavix). As per his wife, he received clearance for the EGD procedure by Dr. Enriquez but she was unsure about the Plavix. \par \par \par \par Dysphagia\par \par - Spoke to Cardiologist Dr. Enriquez who cleared patient for EGD with possible and will be able to hold the Plavix x 5 days before procedure\par - Will therefore schedule an EGD with possible dilation; all risks and benefits discussed\par - CBC, CMP, and INR pre procedure\par - F/U after procedure is done\par - May consider barium esophagram for further evaluation prior to any possible dilation.\par - May also consider neuro evaluation to weigh in on the possibility of and neurologic etiology for the dysphagia.  If dysphagia is primarily due to central cause then a dilation would not be of significant value.\par

## 2023-09-05 ENCOUNTER — TRANSCRIPTION ENCOUNTER (OUTPATIENT)
Age: 83
End: 2023-09-05

## 2023-09-05 ENCOUNTER — OUTPATIENT (OUTPATIENT)
Dept: OUTPATIENT SERVICES | Facility: HOSPITAL | Age: 83
LOS: 1 days | Discharge: HOME | End: 2023-09-05
Payer: MEDICARE

## 2023-09-05 ENCOUNTER — RESULT REVIEW (OUTPATIENT)
Age: 83
End: 2023-09-05

## 2023-09-05 VITALS
RESPIRATION RATE: 16 BRPM | OXYGEN SATURATION: 96 % | DIASTOLIC BLOOD PRESSURE: 72 MMHG | HEART RATE: 56 BPM | SYSTOLIC BLOOD PRESSURE: 131 MMHG

## 2023-09-05 VITALS
WEIGHT: 166.89 LBS | DIASTOLIC BLOOD PRESSURE: 84 MMHG | TEMPERATURE: 98 F | SYSTOLIC BLOOD PRESSURE: 163 MMHG | HEART RATE: 63 BPM | RESPIRATION RATE: 18 BRPM | HEIGHT: 72 IN

## 2023-09-05 DIAGNOSIS — Z87.828 PERSONAL HISTORY OF OTHER (HEALED) PHYSICAL INJURY AND TRAUMA: Chronic | ICD-10-CM

## 2023-09-05 DIAGNOSIS — R13.10 DYSPHAGIA, UNSPECIFIED: ICD-10-CM

## 2023-09-05 DIAGNOSIS — Z85.828 PERSONAL HISTORY OF OTHER MALIGNANT NEOPLASM OF SKIN: Chronic | ICD-10-CM

## 2023-09-05 DIAGNOSIS — K21.9 GASTRO-ESOPHAGEAL REFLUX DISEASE WITHOUT ESOPHAGITIS: ICD-10-CM

## 2023-09-05 PROCEDURE — 88312 SPECIAL STAINS GROUP 1: CPT | Mod: 26

## 2023-09-05 PROCEDURE — 88305 TISSUE EXAM BY PATHOLOGIST: CPT | Mod: 26

## 2023-09-05 PROCEDURE — 43239 EGD BIOPSY SINGLE/MULTIPLE: CPT | Mod: XU

## 2023-09-05 PROCEDURE — 88312 SPECIAL STAINS GROUP 1: CPT

## 2023-09-05 PROCEDURE — 43248 EGD GUIDE WIRE INSERTION: CPT

## 2023-09-05 PROCEDURE — 88305 TISSUE EXAM BY PATHOLOGIST: CPT

## 2023-09-05 RX ORDER — TAMSULOSIN HYDROCHLORIDE 0.4 MG/1
1 CAPSULE ORAL
Qty: 0 | Refills: 0 | DISCHARGE

## 2023-09-05 RX ORDER — PREGABALIN 225 MG/1
1000 CAPSULE ORAL
Qty: 0 | Refills: 0 | DISCHARGE

## 2023-09-05 RX ORDER — PANTOPRAZOLE SODIUM 20 MG/1
1 TABLET, DELAYED RELEASE ORAL
Qty: 0 | Refills: 0 | DISCHARGE

## 2023-09-05 RX ORDER — FLUDROCORTISONE ACETATE 0.1 MG/1
1 TABLET ORAL
Qty: 0 | Refills: 0 | DISCHARGE

## 2023-09-05 RX ORDER — ESCITALOPRAM OXALATE 10 MG/1
1 TABLET, FILM COATED ORAL
Qty: 0 | Refills: 0 | DISCHARGE

## 2023-09-05 RX ORDER — FERROUS SULFATE 325(65) MG
1 TABLET ORAL
Qty: 0 | Refills: 0 | DISCHARGE

## 2023-09-05 RX ORDER — PREGABALIN 225 MG/1
1 CAPSULE ORAL
Qty: 0 | Refills: 0 | DISCHARGE

## 2023-09-05 RX ORDER — ASPIRIN/CALCIUM CARB/MAGNESIUM 324 MG
1 TABLET ORAL
Qty: 0 | Refills: 0 | DISCHARGE

## 2023-09-05 NOTE — ASU DISCHARGE PLAN (ADULT/PEDIATRIC) - CARE PROVIDER_API CALL
Kateryna Sam  Gastroenterology  [address] [place] [address]  Phone: [phone]  Fax: [phone]  Follow Up Time:

## 2023-09-05 NOTE — ASU DISCHARGE PLAN (ADULT/PEDIATRIC) - NS MD DC FALL RISK RISK
For information on Fall & Injury Prevention, visit: [email or web address] OR  [email or web address] OR  https://www.cdc.gov/steadi/patient.html

## 2023-09-05 NOTE — PRE-ANESTHESIA EVALUATION ADULT - NSANTHPMHFT_GEN_ALL_CORE
Detail Level: Detailed Depth Of Biopsy: dermis Was A Bandage Applied: Yes Size Of Lesion In Cm: 0.7 X Size Of Lesion In Cm: 0 Biopsy Type: H and E Chart reviewed, pt interviewed and examined.  PMH: As Above S/p TIA on Plavix, now on hold. Biopsy Method: Dermablade Anesthesia Type: 1% lidocaine with epinephrine Anesthesia Volume In Cc (Will Not Render If 0): 0.5 Hemostasis: Drysol Wound Care: Petrolatum Dressing: bandage Destruction After The Procedure: No Type Of Destruction Used: Curettage Curettage Text: The wound bed was treated with curettage after the biopsy was performed. Cryotherapy Text: The wound bed was treated with cryotherapy after the biopsy was performed. Electrodesiccation Text: The wound bed was treated with electrodesiccation after the biopsy was performed. Electrodesiccation And Curettage Text: The wound bed was treated with electrodesiccation and curettage after the biopsy was performed. Silver Nitrate Text: The wound bed was treated with silver nitrate after the biopsy was performed. Lab: 445 Triangulation (Location Of Lesion In Relation To Distance From Anatomical Landmarks): Size: 0.7cm Consent: Written consent was obtained and risks were reviewed including but not limited to scarring, infection, bleeding, scabbing, incomplete removal, nerve damage and allergy to anesthesia. Post-Care Instructions: I reviewed with the patient in detail post-care instructions. Patient is to keep the biopsy site dry overnight, and then apply bacitracin twice daily until healed. Patient may apply hydrogen peroxide soaks to remove any crusting. Notification Instructions: Patient will be notified of biopsy results. However, patient instructed to call the office if not contacted within 2 weeks. Billing Type: Third-Party Bill Information: Selecting Yes will display possible errors in your note based on the variables you have selected. This validation is only offered as a suggestion for you. PLEASE NOTE THAT THE VALIDATION TEXT WILL BE REMOVED WHEN YOU FINALIZE YOUR NOTE. IF YOU WANT TO FAX A PRELIMINARY NOTE YOU WILL NEED TO TOGGLE THIS TO 'NO' IF YOU DO NOT WANT IT IN YOUR FAXED NOTE.

## 2023-09-05 NOTE — ASU PATIENT PROFILE, ADULT - VISION (WITH CORRECTIVE LENSES IF THE PATIENT USUALLY WEARS THEM):
Normal vision: sees adequately in most situations; can see medication labels, newsprint
(3) adequate

## 2023-09-06 LAB — SURGICAL PATHOLOGY STUDY: SIGNIFICANT CHANGE UP

## 2023-09-07 DIAGNOSIS — R13.10 DYSPHAGIA, UNSPECIFIED: ICD-10-CM

## 2023-09-07 DIAGNOSIS — K22.89 OTHER SPECIFIED DISEASE OF ESOPHAGUS: ICD-10-CM

## 2023-09-07 DIAGNOSIS — I10 ESSENTIAL (PRIMARY) HYPERTENSION: ICD-10-CM

## 2023-09-07 DIAGNOSIS — E78.00 PURE HYPERCHOLESTEROLEMIA, UNSPECIFIED: ICD-10-CM

## 2023-09-07 DIAGNOSIS — D64.9 ANEMIA, UNSPECIFIED: ICD-10-CM

## 2023-09-07 DIAGNOSIS — K29.50 UNSPECIFIED CHRONIC GASTRITIS WITHOUT BLEEDING: ICD-10-CM

## 2023-09-07 DIAGNOSIS — F32.9 MAJOR DEPRESSIVE DISORDER, SINGLE EPISODE, UNSPECIFIED: ICD-10-CM

## 2023-09-25 ENCOUNTER — APPOINTMENT (OUTPATIENT)
Dept: GASTROENTEROLOGY | Facility: CLINIC | Age: 83
End: 2023-09-25
Payer: MEDICARE

## 2023-09-25 PROCEDURE — 99443: CPT | Mod: 95

## 2023-12-07 ENCOUNTER — APPOINTMENT (OUTPATIENT)
Dept: OTOLARYNGOLOGY | Facility: CLINIC | Age: 83
End: 2023-12-07
Payer: MEDICARE

## 2023-12-07 ENCOUNTER — RX RENEWAL (OUTPATIENT)
Age: 83
End: 2023-12-07

## 2023-12-07 DIAGNOSIS — H61.21 IMPACTED CERUMEN, RIGHT EAR: ICD-10-CM

## 2023-12-07 DIAGNOSIS — R13.10 DYSPHAGIA, UNSPECIFIED: ICD-10-CM

## 2023-12-07 DIAGNOSIS — H93.8X3 OTHER SPECIFIED DISORDERS OF EAR, BILATERAL: ICD-10-CM

## 2023-12-07 PROCEDURE — 99214 OFFICE O/P EST MOD 30 MIN: CPT | Mod: 25

## 2023-12-07 PROCEDURE — 69210 REMOVE IMPACTED EAR WAX UNI: CPT

## 2023-12-07 PROCEDURE — 31575 DIAGNOSTIC LARYNGOSCOPY: CPT

## 2023-12-07 RX ORDER — AZELASTINE HYDROCHLORIDE 137 UG/1
0.1 SPRAY, METERED NASAL DAILY
Qty: 90 | Refills: 0 | Status: ACTIVE | COMMUNITY
Start: 2023-12-07 | End: 1900-01-01

## 2023-12-07 RX ORDER — IPRATROPIUM BROMIDE 42 UG/1
0.06 SPRAY NASAL 3 TIMES DAILY
Qty: 1 | Refills: 6 | Status: ACTIVE | COMMUNITY
Start: 2023-12-07 | End: 1900-01-01

## 2024-01-18 ENCOUNTER — APPOINTMENT (OUTPATIENT)
Dept: OTOLARYNGOLOGY | Facility: CLINIC | Age: 84
End: 2024-01-18
Payer: MEDICARE

## 2024-01-18 PROCEDURE — 99214 OFFICE O/P EST MOD 30 MIN: CPT | Mod: 25

## 2024-01-18 PROCEDURE — 31231 NASAL ENDOSCOPY DX: CPT

## 2024-01-18 NOTE — HISTORY OF PRESENT ILLNESS
[FreeTextEntry1] : Patient  returns today c/o dysphagia, throat clearing, rhinorrhea . He has  been taking prescribed  medication and has  noticed improvement  with symptoms

## 2024-01-18 NOTE — PROCEDURE
[None] : none [Flexible Endoscope] : examined with the flexible endoscope [Congested] : congested [Debra] : debra [FreeTextEntry6] : The following anatomic sites were directly examined in a sequential fashion: The scope was introduced in the nasal passage between the middle and inferior turbinates to exam the inferior portion of the middle meatus and the fontanelle, as well as the maxillary ostia. Next, the scope was passed medically and posteriorly to the middle turbinates to examine the sphenoethmoid recess and the superior turbinate region. [de-identified] : rhinorrhea

## 2024-01-18 NOTE — ASSESSMENT
[FreeTextEntry1] : Risks, benefits, and alternatives of ablation of nasal swelling and nerve were explained including but not limited to bleeding, infection, persistent symptoms, numbness, perforation, change in smell, change in taste, need for additional surgery, etc...  Pt will continue with reflux meds

## 2024-01-18 NOTE — REASON FOR VISIT
[Subsequent Evaluation] : a subsequent evaluation for [FreeTextEntry2] : dysphagia, throat clearing, rhinorrhea

## 2024-01-18 NOTE — PHYSICAL EXAM
[de-identified] : cerumen left, s/p auriculectomy [Nasal Endoscopy Performed] : nasal endoscopy was performed, see procedure section for findings [de-identified] : edema [Normal] : mucosa is normal [Midline] : trachea located in midline position

## 2024-02-01 ENCOUNTER — APPOINTMENT (OUTPATIENT)
Dept: OTOLARYNGOLOGY | Facility: CLINIC | Age: 84
End: 2024-02-01
Payer: MEDICARE

## 2024-02-01 DIAGNOSIS — R22.0 LOCALIZED SWELLING, MASS AND LUMP, HEAD: ICD-10-CM

## 2024-02-01 DIAGNOSIS — J34.3 HYPERTROPHY OF NASAL TURBINATES: ICD-10-CM

## 2024-02-01 PROCEDURE — 30802 ABLATE INF TURBINATE SUBMUC: CPT

## 2024-02-01 PROCEDURE — 30117 REMOVAL OF INTRANASAL LESION: CPT | Mod: 59

## 2024-02-01 PROCEDURE — 31242Z: CUSTOM

## 2024-02-01 PROCEDURE — 30930 THER FX NASAL INF TURBINATE: CPT | Mod: 59,LT

## 2024-02-14 NOTE — ASU PATIENT PROFILE, ADULT - ANESTHESIA, PREVIOUS REACTION, PROFILE
Dietitian Note:     MST triggered for Poor appetite. Patient stated there are no concerns for poor appetite. Pt states that he usual consumes an ONS with HD. Amenable to have Nepro added daily-RD will add. MST incorrect. No nutrition intervention indicated at this time. RD available via consult. Will follow per policy.      not sure

## 2024-02-21 ENCOUNTER — APPOINTMENT (OUTPATIENT)
Dept: OTOLARYNGOLOGY | Facility: CLINIC | Age: 84
End: 2024-02-21
Payer: MEDICARE

## 2024-02-21 DIAGNOSIS — Z98.890 OTHER SPECIFIED POSTPROCEDURAL STATES: ICD-10-CM

## 2024-02-21 PROCEDURE — 99024 POSTOP FOLLOW-UP VISIT: CPT

## 2024-02-23 NOTE — PROCEDURE
[Rigid Endoscope] : examined with a rigid endoscope [Congested] : congested [Debra] : debra [Bilateral] : bilateral debridement of the nasal cavity [Moderate] : moderate [Phil] : on both sides [Removed] : which was removed [FreeTextEntry6] : The following anatomic sites were directly examined in a sequential fashion: The scope was introduced in the nasal passage between the middle and inferior turbinates to exam the inferior portion of the middle meatus and the fontanelle, as well as the maxillary ostia. Next, the scope was passed medically and posteriorly to the middle turbinates to examine the sphenoethmoid recess and the superior turbinate region.

## 2024-02-23 NOTE — REASON FOR VISIT
[Subsequent Evaluation] : a subsequent evaluation for [FreeTextEntry2] : s/p bilateral turbinate ablation and outfracture, radiofrequency

## 2024-02-23 NOTE — HISTORY OF PRESENT ILLNESS
[FreeTextEntry1] : Pt is doing s/p ablation. Pt has decreased discharge and has improved breathing. Pt has not needed sprays.

## 2024-04-05 ENCOUNTER — RX RENEWAL (OUTPATIENT)
Age: 84
End: 2024-04-05

## 2024-04-05 RX ORDER — LEVOCETIRIZINE DIHYDROCHLORIDE 5 MG/1
5 TABLET ORAL DAILY
Qty: 90 | Refills: 0 | Status: ACTIVE | COMMUNITY
Start: 2023-12-07 | End: 1900-01-01

## 2024-04-05 RX ORDER — FAMOTIDINE 40 MG/1
40 TABLET, FILM COATED ORAL
Qty: 90 | Refills: 0 | Status: ACTIVE | COMMUNITY
Start: 2023-05-08 | End: 1900-01-01

## 2024-04-19 ENCOUNTER — APPOINTMENT (OUTPATIENT)
Dept: NEUROLOGY | Facility: CLINIC | Age: 84
End: 2024-04-19

## 2024-06-20 ENCOUNTER — APPOINTMENT (OUTPATIENT)
Dept: OTOLARYNGOLOGY | Facility: CLINIC | Age: 84
End: 2024-06-20
Payer: MEDICARE

## 2024-06-20 DIAGNOSIS — H61.23 IMPACTED CERUMEN, BILATERAL: ICD-10-CM

## 2024-06-20 DIAGNOSIS — R09.89 OTHER SPECIFIED SYMPTOMS AND SIGNS INVOLVING THE CIRCULATORY AND RESPIRATORY SYSTEMS: ICD-10-CM

## 2024-06-20 DIAGNOSIS — J34.89 OTHER SPECIFIED DISORDERS OF NOSE AND NASAL SINUSES: ICD-10-CM

## 2024-06-20 DIAGNOSIS — K21.9 GASTRO-ESOPHAGEAL REFLUX DISEASE W/OUT ESOPHAGITIS: ICD-10-CM

## 2024-06-20 PROCEDURE — 31231 NASAL ENDOSCOPY DX: CPT

## 2024-06-20 PROCEDURE — 69210 REMOVE IMPACTED EAR WAX UNI: CPT

## 2024-06-20 PROCEDURE — 99214 OFFICE O/P EST MOD 30 MIN: CPT | Mod: 25

## 2024-06-20 RX ORDER — LEVOCETIRIZINE DIHYDROCHLORIDE 5 MG/1
5 TABLET ORAL DAILY
Qty: 1 | Refills: 3 | Status: ACTIVE | COMMUNITY
Start: 2024-06-20 | End: 1900-01-01

## 2024-06-20 RX ORDER — PANTOPRAZOLE 40 MG/1
40 TABLET, DELAYED RELEASE ORAL TWICE DAILY
Qty: 60 | Refills: 3 | Status: ACTIVE | COMMUNITY
Start: 2024-06-20 | End: 1900-01-01

## 2024-06-20 RX ORDER — FAMOTIDINE 40 MG/1
40 TABLET, FILM COATED ORAL
Qty: 90 | Refills: 3 | Status: ACTIVE | COMMUNITY
Start: 2024-06-20 | End: 1900-01-01

## 2024-06-20 NOTE — PROCEDURE
[FreeTextEntry6] : The following anatomic sites were directly examined in a sequential fashion: The scope was introduced in the nasal passage between the middle and inferior turbinates to exam the inferior portion of the middle meatus and the fontanelle, as well as the maxillary ostia. Next, the scope was passed medically and posteriorly to the middle turbinates to examine the sphenoethmoid recess and the superior turbinate region.

## 2024-06-20 NOTE — HISTORY OF PRESENT ILLNESS
[FreeTextEntry1] : Patient here s/p nasal surgery.  Still having post nasal drip but noticeably improved , Pt has been clearing throat. He has been taking  Pepcid at night. Pt has continuous throat clearing.  Pt has clogged ears.

## 2024-06-20 NOTE — PHYSICAL EXAM
[Nasal Endoscopy Performed] : nasal endoscopy was performed, see procedure section for findings [Normal] : no abnormal secretions [de-identified] : edema

## 2024-08-08 ENCOUNTER — APPOINTMENT (OUTPATIENT)
Dept: OTOLARYNGOLOGY | Facility: CLINIC | Age: 84
End: 2024-08-08

## 2024-08-08 PROCEDURE — 31575 DIAGNOSTIC LARYNGOSCOPY: CPT

## 2024-08-08 PROCEDURE — 99213 OFFICE O/P EST LOW 20 MIN: CPT | Mod: 25

## 2024-08-08 PROCEDURE — 69210 REMOVE IMPACTED EAR WAX UNI: CPT

## 2024-08-08 NOTE — REASON FOR VISIT
[Subsequent Evaluation] : a subsequent evaluation for [FreeTextEntry2] : L LPRD , throat clearing and clogged ears

## 2024-08-08 NOTE — ASSESSMENT
[FreeTextEntry1] : Pt was counselled on a low acid and appropriate dietary modifications. Pt will continue on meds

## 2024-08-08 NOTE — HISTORY OF PRESENT ILLNESS
[FreeTextEntry1] : Patient returns today c/o LPRD , throat clearing and clogged ears . Still  clearing throat , no discomfort.  Clogged ears ,  no  ear pain. Denies any sensation that the ears are clogged. Has been taking Famotidine, Pantoprazole, Levocetirizine with improvement. Sometimes will cough and have phlegm in the back of his throat.

## 2024-11-02 ENCOUNTER — EMERGENCY (EMERGENCY)
Facility: HOSPITAL | Age: 84
LOS: 0 days | Discharge: HOME | End: 2024-11-02
Attending: EMERGENCY MEDICINE
Payer: MEDICARE

## 2024-11-02 VITALS
HEART RATE: 77 BPM | TEMPERATURE: 98 F | RESPIRATION RATE: 18 BRPM | OXYGEN SATURATION: 95 % | SYSTOLIC BLOOD PRESSURE: 118 MMHG | WEIGHT: 169.98 LBS | DIASTOLIC BLOOD PRESSURE: 77 MMHG

## 2024-11-02 VITALS
OXYGEN SATURATION: 96 % | SYSTOLIC BLOOD PRESSURE: 132 MMHG | RESPIRATION RATE: 16 BRPM | HEART RATE: 84 BPM | DIASTOLIC BLOOD PRESSURE: 90 MMHG

## 2024-11-02 DIAGNOSIS — R07.81 PLEURODYNIA: ICD-10-CM

## 2024-11-02 DIAGNOSIS — Z90.81 ACQUIRED ABSENCE OF SPLEEN: ICD-10-CM

## 2024-11-02 DIAGNOSIS — E87.6 HYPOKALEMIA: ICD-10-CM

## 2024-11-02 DIAGNOSIS — Z86.73 PERSONAL HISTORY OF TRANSIENT ISCHEMIC ATTACK (TIA), AND CEREBRAL INFARCTION WITHOUT RESIDUAL DEFICITS: ICD-10-CM

## 2024-11-02 DIAGNOSIS — E78.5 HYPERLIPIDEMIA, UNSPECIFIED: ICD-10-CM

## 2024-11-02 DIAGNOSIS — Z87.820 PERSONAL HISTORY OF TRAUMATIC BRAIN INJURY: ICD-10-CM

## 2024-11-02 DIAGNOSIS — E83.42 HYPOMAGNESEMIA: ICD-10-CM

## 2024-11-02 DIAGNOSIS — K21.9 GASTRO-ESOPHAGEAL REFLUX DISEASE WITHOUT ESOPHAGITIS: ICD-10-CM

## 2024-11-02 DIAGNOSIS — Z85.828 PERSONAL HISTORY OF OTHER MALIGNANT NEOPLASM OF SKIN: Chronic | ICD-10-CM

## 2024-11-02 DIAGNOSIS — Z87.828 PERSONAL HISTORY OF OTHER (HEALED) PHYSICAL INJURY AND TRAUMA: Chronic | ICD-10-CM
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
## 2024-11-02 DIAGNOSIS — Z86.2 PERSONAL HISTORY OF DISEASES OF THE BLOOD AND BLOOD-FORMING ORGANS AND CERTAIN DISORDERS INVOLVING THE IMMUNE MECHANISM: ICD-10-CM

## 2024-11-02 DIAGNOSIS — Z87.891 PERSONAL HISTORY OF NICOTINE DEPENDENCE: ICD-10-CM
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
## 2024-11-02 DIAGNOSIS — I10 ESSENTIAL (PRIMARY) HYPERTENSION: ICD-10-CM

## 2024-11-02 LAB
ALBUMIN SERPL ELPH-MCNC: 3.3 G/DL — LOW (ref 3.5–5.2)
ALP SERPL-CCNC: 100 U/L — SIGNIFICANT CHANGE UP (ref 30–115)
ALT FLD-CCNC: 11 U/L — SIGNIFICANT CHANGE UP (ref 0–41)
ANION GAP SERPL CALC-SCNC: 8 MMOL/L — SIGNIFICANT CHANGE UP (ref 7–14)
APTT BLD: 35.9 SEC — SIGNIFICANT CHANGE UP (ref 27–39.2)
AST SERPL-CCNC: 20 U/L — SIGNIFICANT CHANGE UP (ref 0–41)
BASOPHILS # BLD AUTO: 0.06 K/UL — SIGNIFICANT CHANGE UP (ref 0–0.2)
BASOPHILS NFR BLD AUTO: 0.6 % — SIGNIFICANT CHANGE UP (ref 0–1)
BILIRUB SERPL-MCNC: 1.1 MG/DL — SIGNIFICANT CHANGE UP (ref 0.2–1.2)
BUN SERPL-MCNC: 13 MG/DL — SIGNIFICANT CHANGE UP (ref 10–20)
CALCIUM SERPL-MCNC: 8.7 MG/DL — SIGNIFICANT CHANGE UP (ref 8.4–10.5)
CHLORIDE SERPL-SCNC: 94 MMOL/L — LOW (ref 98–110)
CO2 SERPL-SCNC: 36 MMOL/L — HIGH (ref 17–32)
CREAT SERPL-MCNC: 1 MG/DL — SIGNIFICANT CHANGE UP (ref 0.7–1.5)
EGFR: 74 ML/MIN/1.73M2 — SIGNIFICANT CHANGE UP
EGFR: 74 ML/MIN/1.73M2 — SIGNIFICANT CHANGE UP
EOSINOPHIL # BLD AUTO: 0.13 K/UL — SIGNIFICANT CHANGE UP (ref 0–0.7)
EOSINOPHIL NFR BLD AUTO: 1.3 % — SIGNIFICANT CHANGE UP (ref 0–8)
GLUCOSE SERPL-MCNC: 96 MG/DL — SIGNIFICANT CHANGE UP (ref 70–99)
HCT VFR BLD CALC: 38.8 % — LOW (ref 42–52)
HGB BLD-MCNC: 12.9 G/DL — LOW (ref 14–18)
IMM GRANULOCYTES NFR BLD AUTO: 0.3 % — SIGNIFICANT CHANGE UP (ref 0.1–0.3)
INR BLD: 1.28 RATIO — SIGNIFICANT CHANGE UP (ref 0.65–1.3)
LIDOCAIN IGE QN: 21 U/L — SIGNIFICANT CHANGE UP (ref 7–60)
LYMPHOCYTES # BLD AUTO: 2.3 K/UL — SIGNIFICANT CHANGE UP (ref 1.2–3.4)
LYMPHOCYTES # BLD AUTO: 23.2 % — SIGNIFICANT CHANGE UP (ref 20.5–51.1)
MAGNESIUM SERPL-MCNC: 1.7 MG/DL — LOW (ref 1.8–2.4)
MCHC RBC-ENTMCNC: 32.4 PG — HIGH (ref 27–31)
MCHC RBC-ENTMCNC: 33.2 G/DL — SIGNIFICANT CHANGE UP (ref 32–37)
MCV RBC AUTO: 97.5 FL — HIGH (ref 80–94)
MONOCYTES # BLD AUTO: 1.42 K/UL — HIGH (ref 0.1–0.6)
MONOCYTES NFR BLD AUTO: 14.3 % — HIGH (ref 1.7–9.3)
NEUTROPHILS # BLD AUTO: 5.97 K/UL — SIGNIFICANT CHANGE UP (ref 1.4–6.5)
NEUTROPHILS NFR BLD AUTO: 60.3 % — SIGNIFICANT CHANGE UP (ref 42.2–75.2)
NRBC # BLD: 0 /100 WBCS — SIGNIFICANT CHANGE UP (ref 0–0)
NRBC BLD-RTO: 0 /100 WBCS — SIGNIFICANT CHANGE UP (ref 0–0)
NT-PROBNP SERPL-SCNC: 4325 PG/ML — HIGH (ref 0–300)
PLATELET # BLD AUTO: 241 K/UL — SIGNIFICANT CHANGE UP (ref 130–400)
PMV BLD: 12.4 FL — HIGH (ref 7.4–10.4)
POTASSIUM SERPL-MCNC: 2.6 MMOL/L — CRITICAL LOW (ref 3.5–5)
POTASSIUM SERPL-SCNC: 2.6 MMOL/L — CRITICAL LOW (ref 3.5–5)
PROT SERPL-MCNC: 6.4 G/DL — SIGNIFICANT CHANGE UP (ref 6–8)
PROTHROM AB SERPL-ACNC: 15.2 SEC — HIGH (ref 9.95–12.87)
RBC # BLD: 3.98 M/UL — LOW (ref 4.7–6.1)
RBC # FLD: 15.6 % — HIGH (ref 11.5–14.5)
SODIUM SERPL-SCNC: 138 MMOL/L — SIGNIFICANT CHANGE UP (ref 135–146)
TROPONIN T, HIGH SENSITIVITY RESULT: 34 NG/L — HIGH (ref 6–21)
TROPONIN T, HIGH SENSITIVITY RESULT: 37 NG/L — HIGH (ref 6–21)
WBC # BLD: 9.91 K/UL — SIGNIFICANT CHANGE UP (ref 4.8–10.8)
WBC # FLD AUTO: 9.91 K/UL — SIGNIFICANT CHANGE UP (ref 4.8–10.8)

## 2024-11-02 PROCEDURE — 84484 ASSAY OF TROPONIN QUANT: CPT | Mod: 59

## 2024-11-02 PROCEDURE — 99285 EMERGENCY DEPT VISIT HI MDM: CPT | Mod: 25

## 2024-11-02 PROCEDURE — 80053 COMPREHEN METABOLIC PANEL: CPT

## 2024-11-02 PROCEDURE — 71046 X-RAY EXAM CHEST 2 VIEWS: CPT

## 2024-11-02 PROCEDURE — 83690 ASSAY OF LIPASE: CPT

## 2024-11-02 PROCEDURE — 85025 COMPLETE CBC W/AUTO DIFF WBC: CPT

## 2024-11-02 PROCEDURE — 36415 COLL VENOUS BLD VENIPUNCTURE: CPT

## 2024-11-02 PROCEDURE — 93010 ELECTROCARDIOGRAM REPORT: CPT

## 2024-11-02 PROCEDURE — 74174 CTA ABD&PLVS W/CONTRAST: CPT | Mod: MC

## 2024-11-02 PROCEDURE — 71275 CT ANGIOGRAPHY CHEST: CPT | Mod: MC

## 2024-11-02 PROCEDURE — 96374 THER/PROPH/DIAG INJ IV PUSH: CPT

## 2024-11-02 PROCEDURE — 85610 PROTHROMBIN TIME: CPT

## 2024-11-02 PROCEDURE — 99285 EMERGENCY DEPT VISIT HI MDM: CPT | Mod: FS

## 2024-11-02 PROCEDURE — 83735 ASSAY OF MAGNESIUM: CPT

## 2024-11-02 PROCEDURE — 71275 CT ANGIOGRAPHY CHEST: CPT | Mod: 26,MC

## 2024-11-02 PROCEDURE — 83880 ASSAY OF NATRIURETIC PEPTIDE: CPT

## 2024-11-02 PROCEDURE — 85730 THROMBOPLASTIN TIME PARTIAL: CPT

## 2024-11-02 PROCEDURE — 71046 X-RAY EXAM CHEST 2 VIEWS: CPT | Mod: 26

## 2024-11-02 PROCEDURE — 74174 CTA ABD&PLVS W/CONTRAST: CPT | Mod: 26,MC

## 2024-11-02 PROCEDURE — 96376 TX/PRO/DX INJ SAME DRUG ADON: CPT

## 2024-11-02 PROCEDURE — 96375 TX/PRO/DX INJ NEW DRUG ADDON: CPT

## 2024-11-02 PROCEDURE — 93005 ELECTROCARDIOGRAM TRACING: CPT

## 2024-11-02 RX ORDER — MAGNESIUM SULFATE 500 MG/ML
2 SYRINGE (ML) INJECTION ONCE
Refills: 0 | Status: COMPLETED | OUTPATIENT
Start: 2024-11-02 | End: 2024-11-02

## 2024-11-02 RX ADMIN — Medication 50 MILLIEQUIVALENT(S): at 15:18

## 2024-11-02 RX ADMIN — Medication 50 MILLIEQUIVALENT(S): at 19:34

## 2024-11-02 RX ADMIN — Medication 25 GRAM(S): at 15:17

## 2024-11-02 RX ADMIN — Medication 40 MILLIEQUIVALENT(S): at 15:16

## 2024-11-02 RX ADMIN — Medication 50 MILLIEQUIVALENT(S): at 17:19

## 2024-11-09 ENCOUNTER — EMERGENCY (EMERGENCY)
Facility: HOSPITAL | Age: 84
LOS: 0 days | Discharge: HOME | End: 2024-11-09
Attending: STUDENT IN AN ORGANIZED HEALTH CARE EDUCATION/TRAINING PROGRAM
Payer: MEDICARE

## 2024-11-09 VITALS
RESPIRATION RATE: 16 BRPM | HEART RATE: 78 BPM | TEMPERATURE: 97 F | SYSTOLIC BLOOD PRESSURE: 134 MMHG | WEIGHT: 175.05 LBS | OXYGEN SATURATION: 97 % | DIASTOLIC BLOOD PRESSURE: 78 MMHG

## 2024-11-09 VITALS
OXYGEN SATURATION: 97 % | SYSTOLIC BLOOD PRESSURE: 142 MMHG | RESPIRATION RATE: 16 BRPM | DIASTOLIC BLOOD PRESSURE: 84 MMHG | HEART RATE: 89 BPM

## 2024-11-09 DIAGNOSIS — I10 ESSENTIAL (PRIMARY) HYPERTENSION: ICD-10-CM

## 2024-11-09 DIAGNOSIS — E78.5 HYPERLIPIDEMIA, UNSPECIFIED: ICD-10-CM

## 2024-11-09 DIAGNOSIS — K21.9 GASTRO-ESOPHAGEAL REFLUX DISEASE WITHOUT ESOPHAGITIS: ICD-10-CM

## 2024-11-09 DIAGNOSIS — Z79.02 LONG TERM (CURRENT) USE OF ANTITHROMBOTICS/ANTIPLATELETS: ICD-10-CM
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
## 2024-11-09 DIAGNOSIS — Z23 ENCOUNTER FOR IMMUNIZATION: ICD-10-CM

## 2024-11-09 DIAGNOSIS — Z85.828 PERSONAL HISTORY OF OTHER MALIGNANT NEOPLASM OF SKIN: Chronic | ICD-10-CM

## 2024-11-09 DIAGNOSIS — Z87.828 PERSONAL HISTORY OF OTHER (HEALED) PHYSICAL INJURY AND TRAUMA: Chronic | ICD-10-CM

## 2024-11-09 DIAGNOSIS — S01.511A LACERATION WITHOUT FOREIGN BODY OF LIP, INITIAL ENCOUNTER: ICD-10-CM

## 2024-11-09 DIAGNOSIS — Z87.891 PERSONAL HISTORY OF NICOTINE DEPENDENCE: ICD-10-CM

## 2024-11-09 DIAGNOSIS — Z86.73 PERSONAL HISTORY OF TRANSIENT ISCHEMIC ATTACK (TIA), AND CEREBRAL INFARCTION WITHOUT RESIDUAL DEFICITS: ICD-10-CM

## 2024-11-09 DIAGNOSIS — S00.81XA ABRASION OF OTHER PART OF HEAD, INITIAL ENCOUNTER: ICD-10-CM
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
## 2024-11-09 LAB
ALBUMIN SERPL ELPH-MCNC: 3.8 G/DL — SIGNIFICANT CHANGE UP (ref 3.5–5.2)
ALP SERPL-CCNC: 127 U/L — HIGH (ref 30–115)
ALT FLD-CCNC: 17 U/L — SIGNIFICANT CHANGE UP (ref 0–41)
ANION GAP SERPL CALC-SCNC: 7 MMOL/L — SIGNIFICANT CHANGE UP (ref 7–14)
APTT BLD: 30.6 SEC — SIGNIFICANT CHANGE UP (ref 27–39.2)
AST SERPL-CCNC: 50 U/L — HIGH (ref 0–41)
BASOPHILS # BLD AUTO: 0.08 K/UL — SIGNIFICANT CHANGE UP (ref 0–0.2)
BASOPHILS NFR BLD AUTO: 0.8 % — SIGNIFICANT CHANGE UP (ref 0–1)
BILIRUB SERPL-MCNC: 0.8 MG/DL — SIGNIFICANT CHANGE UP (ref 0.2–1.2)
BUN SERPL-MCNC: 20 MG/DL — SIGNIFICANT CHANGE UP (ref 10–20)
CALCIUM SERPL-MCNC: 9 MG/DL — SIGNIFICANT CHANGE UP (ref 8.4–10.5)
CHLORIDE SERPL-SCNC: 94 MMOL/L — LOW (ref 98–110)
CO2 SERPL-SCNC: 36 MMOL/L — HIGH (ref 17–32)
CREAT SERPL-MCNC: 0.9 MG/DL — SIGNIFICANT CHANGE UP (ref 0.7–1.5)
EGFR: 84 ML/MIN/1.73M2 — SIGNIFICANT CHANGE UP
EGFR: 84 ML/MIN/1.73M2 — SIGNIFICANT CHANGE UP
EOSINOPHIL # BLD AUTO: 0.34 K/UL — SIGNIFICANT CHANGE UP (ref 0–0.7)
EOSINOPHIL NFR BLD AUTO: 3.5 % — SIGNIFICANT CHANGE UP (ref 0–8)
ETHANOL SERPL-MCNC: <10 MG/DL — SIGNIFICANT CHANGE UP
GLUCOSE SERPL-MCNC: 107 MG/DL — HIGH (ref 70–99)
HCT VFR BLD CALC: 43.5 % — SIGNIFICANT CHANGE UP (ref 42–52)
HGB BLD-MCNC: 14.1 G/DL — SIGNIFICANT CHANGE UP (ref 14–18)
IMM GRANULOCYTES NFR BLD AUTO: 0.3 % — SIGNIFICANT CHANGE UP (ref 0.1–0.3)
INR BLD: 1.1 RATIO — SIGNIFICANT CHANGE UP (ref 0.65–1.3)
LACTATE SERPL-SCNC: 1.9 MMOL/L — SIGNIFICANT CHANGE UP (ref 0.7–2)
LIDOCAIN IGE QN: 33 U/L — SIGNIFICANT CHANGE UP (ref 7–60)
LYMPHOCYTES # BLD AUTO: 3.03 K/UL — SIGNIFICANT CHANGE UP (ref 1.2–3.4)
LYMPHOCYTES # BLD AUTO: 31.1 % — SIGNIFICANT CHANGE UP (ref 20.5–51.1)
MCHC RBC-ENTMCNC: 32.2 PG — HIGH (ref 27–31)
MCHC RBC-ENTMCNC: 32.4 G/DL — SIGNIFICANT CHANGE UP (ref 32–37)
MCV RBC AUTO: 99.3 FL — HIGH (ref 80–94)
MONOCYTES # BLD AUTO: 1.05 K/UL — HIGH (ref 0.1–0.6)
MONOCYTES NFR BLD AUTO: 10.8 % — HIGH (ref 1.7–9.3)
NEUTROPHILS # BLD AUTO: 5.2 K/UL — SIGNIFICANT CHANGE UP (ref 1.4–6.5)
NEUTROPHILS NFR BLD AUTO: 53.5 % — SIGNIFICANT CHANGE UP (ref 42.2–75.2)
NRBC # BLD: 0 /100 WBCS — SIGNIFICANT CHANGE UP (ref 0–0)
NRBC BLD-RTO: 0 /100 WBCS — SIGNIFICANT CHANGE UP (ref 0–0)
PLATELET # BLD AUTO: 276 K/UL — SIGNIFICANT CHANGE UP (ref 130–400)
PMV BLD: 11.8 FL — HIGH (ref 7.4–10.4)
POTASSIUM SERPL-MCNC: 4.8 MMOL/L — SIGNIFICANT CHANGE UP (ref 3.5–5)
POTASSIUM SERPL-SCNC: 4.8 MMOL/L — SIGNIFICANT CHANGE UP (ref 3.5–5)
PROT SERPL-MCNC: 7.5 G/DL — SIGNIFICANT CHANGE UP (ref 6–8)
PROTHROM AB SERPL-ACNC: 13 SEC — HIGH (ref 9.95–12.87)
RBC # BLD: 4.38 M/UL — LOW (ref 4.7–6.1)
RBC # FLD: 14.9 % — HIGH (ref 11.5–14.5)
SODIUM SERPL-SCNC: 137 MMOL/L — SIGNIFICANT CHANGE UP (ref 135–146)
WBC # BLD: 9.73 K/UL — SIGNIFICANT CHANGE UP (ref 4.8–10.8)
WBC # FLD AUTO: 9.73 K/UL — SIGNIFICANT CHANGE UP (ref 4.8–10.8)

## 2024-11-09 PROCEDURE — 73564 X-RAY EXAM KNEE 4 OR MORE: CPT | Mod: 26,50

## 2024-11-09 PROCEDURE — 70486 CT MAXILLOFACIAL W/O DYE: CPT | Mod: 26,MC

## 2024-11-09 PROCEDURE — 72125 CT NECK SPINE W/O DYE: CPT | Mod: 26,MC

## 2024-11-09 PROCEDURE — 90471 IMMUNIZATION ADMIN: CPT

## 2024-11-09 PROCEDURE — 72170 X-RAY EXAM OF PELVIS: CPT | Mod: 26

## 2024-11-09 PROCEDURE — 99285 EMERGENCY DEPT VISIT HI MDM: CPT | Mod: 25

## 2024-11-09 PROCEDURE — 70450 CT HEAD/BRAIN W/O DYE: CPT | Mod: MC

## 2024-11-09 PROCEDURE — 71045 X-RAY EXAM CHEST 1 VIEW: CPT | Mod: 26

## 2024-11-09 PROCEDURE — 36000 PLACE NEEDLE IN VEIN: CPT

## 2024-11-09 PROCEDURE — 73564 X-RAY EXAM KNEE 4 OR MORE: CPT | Mod: 50

## 2024-11-09 PROCEDURE — 93010 ELECTROCARDIOGRAM REPORT: CPT

## 2024-11-09 PROCEDURE — 70486 CT MAXILLOFACIAL W/O DYE: CPT | Mod: MC

## 2024-11-09 PROCEDURE — 72125 CT NECK SPINE W/O DYE: CPT | Mod: MC

## 2024-11-09 PROCEDURE — 99285 EMERGENCY DEPT VISIT HI MDM: CPT | Mod: FS,GC

## 2024-11-09 PROCEDURE — 70450 CT HEAD/BRAIN W/O DYE: CPT | Mod: 26,MC

## 2024-11-19 ENCOUNTER — APPOINTMENT (OUTPATIENT)
Dept: OTOLARYNGOLOGY | Facility: CLINIC | Age: 84
End: 2024-11-19
Payer: MEDICARE

## 2024-11-19 DIAGNOSIS — R22.0 LOCALIZED SWELLING, MASS AND LUMP, HEAD: ICD-10-CM

## 2024-11-19 DIAGNOSIS — H61.23 IMPACTED CERUMEN, BILATERAL: ICD-10-CM

## 2024-11-19 DIAGNOSIS — S02.2XXA FRACTURE OF NASAL BONES, INITIAL ENCOUNTER FOR CLOSED FRACTURE: ICD-10-CM

## 2024-11-19 DIAGNOSIS — H93.8X3 OTHER SPECIFIED DISORDERS OF EAR, BILATERAL: ICD-10-CM

## 2024-11-19 PROCEDURE — 31231 NASAL ENDOSCOPY DX: CPT

## 2024-11-19 PROCEDURE — 99214 OFFICE O/P EST MOD 30 MIN: CPT | Mod: 25

## 2024-11-19 PROCEDURE — 69210 REMOVE IMPACTED EAR WAX UNI: CPT

## 2024-12-25 ENCOUNTER — INPATIENT (INPATIENT)
Facility: HOSPITAL | Age: 84
LOS: 4 days | Discharge: HOME HEALTH | DRG: 293 | End: 2024-12-30
Attending: INTERNAL MEDICINE
Payer: MEDICARE

## 2024-12-25 VITALS
RESPIRATION RATE: 19 BRPM | HEART RATE: 78 BPM | DIASTOLIC BLOOD PRESSURE: 89 MMHG | WEIGHT: 149.91 LBS | SYSTOLIC BLOOD PRESSURE: 139 MMHG | OXYGEN SATURATION: 100 %

## 2024-12-25 DIAGNOSIS — Z85.828 PERSONAL HISTORY OF OTHER MALIGNANT NEOPLASM OF SKIN: Chronic | ICD-10-CM

## 2024-12-25 DIAGNOSIS — Z87.828 PERSONAL HISTORY OF OTHER (HEALED) PHYSICAL INJURY AND TRAUMA: Chronic | ICD-10-CM

## 2024-12-25 LAB
ALBUMIN SERPL ELPH-MCNC: 3.4 G/DL — LOW (ref 3.5–5.2)
ALP SERPL-CCNC: 110 U/L — SIGNIFICANT CHANGE UP (ref 30–115)
ALT FLD-CCNC: 13 U/L — SIGNIFICANT CHANGE UP (ref 0–41)
ANION GAP SERPL CALC-SCNC: 7 MMOL/L — SIGNIFICANT CHANGE UP (ref 7–14)
AST SERPL-CCNC: 24 U/L — SIGNIFICANT CHANGE UP (ref 0–41)
BASOPHILS # BLD AUTO: 0.04 K/UL — SIGNIFICANT CHANGE UP (ref 0–0.2)
BASOPHILS NFR BLD AUTO: 0.5 % — SIGNIFICANT CHANGE UP (ref 0–1)
BILIRUB SERPL-MCNC: 0.8 MG/DL — SIGNIFICANT CHANGE UP (ref 0.2–1.2)
BUN SERPL-MCNC: 21 MG/DL — HIGH (ref 10–20)
CALCIUM SERPL-MCNC: 9 MG/DL — SIGNIFICANT CHANGE UP (ref 8.4–10.5)
CHLORIDE SERPL-SCNC: 106 MMOL/L — SIGNIFICANT CHANGE UP (ref 98–110)
CO2 SERPL-SCNC: 28 MMOL/L — SIGNIFICANT CHANGE UP (ref 17–32)
CREAT SERPL-MCNC: 0.9 MG/DL — SIGNIFICANT CHANGE UP (ref 0.7–1.5)
EGFR: 84 ML/MIN/1.73M2 — SIGNIFICANT CHANGE UP
EOSINOPHIL # BLD AUTO: 0.18 K/UL — SIGNIFICANT CHANGE UP (ref 0–0.7)
EOSINOPHIL NFR BLD AUTO: 2.4 % — SIGNIFICANT CHANGE UP (ref 0–8)
GAS PNL BLDV: SIGNIFICANT CHANGE UP
GLUCOSE SERPL-MCNC: 104 MG/DL — HIGH (ref 70–99)
HCT VFR BLD CALC: 36.5 % — LOW (ref 42–52)
HGB BLD-MCNC: 11.7 G/DL — LOW (ref 14–18)
IMM GRANULOCYTES NFR BLD AUTO: 0.4 % — HIGH (ref 0.1–0.3)
LYMPHOCYTES # BLD AUTO: 1.98 K/UL — SIGNIFICANT CHANGE UP (ref 1.2–3.4)
LYMPHOCYTES # BLD AUTO: 26.5 % — SIGNIFICANT CHANGE UP (ref 20.5–51.1)
MCHC RBC-ENTMCNC: 32.1 G/DL — SIGNIFICANT CHANGE UP (ref 32–37)
MCHC RBC-ENTMCNC: 33.2 PG — HIGH (ref 27–31)
MCV RBC AUTO: 103.7 FL — HIGH (ref 80–94)
MONOCYTES # BLD AUTO: 0.75 K/UL — HIGH (ref 0.1–0.6)
MONOCYTES NFR BLD AUTO: 10.1 % — HIGH (ref 1.7–9.3)
NEUTROPHILS # BLD AUTO: 4.48 K/UL — SIGNIFICANT CHANGE UP (ref 1.4–6.5)
NEUTROPHILS NFR BLD AUTO: 60.1 % — SIGNIFICANT CHANGE UP (ref 42.2–75.2)
NRBC # BLD: 0 /100 WBCS — SIGNIFICANT CHANGE UP (ref 0–0)
PLATELET # BLD AUTO: 173 K/UL — SIGNIFICANT CHANGE UP (ref 130–400)
PMV BLD: 12.7 FL — HIGH (ref 7.4–10.4)
POTASSIUM SERPL-MCNC: 3.9 MMOL/L — SIGNIFICANT CHANGE UP (ref 3.5–5)
POTASSIUM SERPL-SCNC: 3.9 MMOL/L — SIGNIFICANT CHANGE UP (ref 3.5–5)
PROT SERPL-MCNC: 6.7 G/DL — SIGNIFICANT CHANGE UP (ref 6–8)
RBC # BLD: 3.52 M/UL — LOW (ref 4.7–6.1)
RBC # FLD: 17.8 % — HIGH (ref 11.5–14.5)
SODIUM SERPL-SCNC: 141 MMOL/L — SIGNIFICANT CHANGE UP (ref 135–146)
WBC # BLD: 7.46 K/UL — SIGNIFICANT CHANGE UP (ref 4.8–10.8)
WBC # FLD AUTO: 7.46 K/UL — SIGNIFICANT CHANGE UP (ref 4.8–10.8)

## 2024-12-25 PROCEDURE — 99285 EMERGENCY DEPT VISIT HI MDM: CPT | Mod: GC

## 2024-12-25 PROCEDURE — 71275 CT ANGIOGRAPHY CHEST: CPT | Mod: 26,MC

## 2024-12-25 RX ORDER — SODIUM CHLORIDE 9 MG/ML
500 INJECTION, SOLUTION INTRAMUSCULAR; INTRAVENOUS; SUBCUTANEOUS ONCE
Refills: 0 | Status: COMPLETED | OUTPATIENT
Start: 2024-12-25 | End: 2024-12-25

## 2024-12-25 RX ADMIN — SODIUM CHLORIDE 500 MILLILITER(S): 9 INJECTION, SOLUTION INTRAMUSCULAR; INTRAVENOUS; SUBCUTANEOUS at 22:09

## 2024-12-25 NOTE — ED ADULT TRIAGE NOTE - CHIEF COMPLAINT QUOTE
BIBA c/o difficulty breathing since last night. Pt sts he feels like something is stuck in his throat. Pt has hx of multiple esophageal procedures, reports having scar tissue in his throat. Per ems, pt sating 96 on RA.

## 2024-12-26 DIAGNOSIS — I50.9 HEART FAILURE, UNSPECIFIED: ICD-10-CM

## 2024-12-26 LAB
ALBUMIN SERPL ELPH-MCNC: 3.4 G/DL — LOW (ref 3.5–5.2)
ALP SERPL-CCNC: 100 U/L — SIGNIFICANT CHANGE UP (ref 30–115)
ALT FLD-CCNC: 13 U/L — SIGNIFICANT CHANGE UP (ref 0–41)
ANION GAP SERPL CALC-SCNC: 12 MMOL/L — SIGNIFICANT CHANGE UP (ref 7–14)
AST SERPL-CCNC: 22 U/L — SIGNIFICANT CHANGE UP (ref 0–41)
BASE EXCESS BLDV CALC-SCNC: 4.4 MMOL/L — HIGH (ref -2–3)
BASOPHILS # BLD AUTO: 0.04 K/UL — SIGNIFICANT CHANGE UP (ref 0–0.2)
BASOPHILS NFR BLD AUTO: 0.6 % — SIGNIFICANT CHANGE UP (ref 0–1)
BILIRUB SERPL-MCNC: 1 MG/DL — SIGNIFICANT CHANGE UP (ref 0.2–1.2)
BUN SERPL-MCNC: 17 MG/DL — SIGNIFICANT CHANGE UP (ref 10–20)
CA-I SERPL-SCNC: 1.17 MMOL/L — SIGNIFICANT CHANGE UP (ref 1.15–1.33)
CALCIUM SERPL-MCNC: 8.2 MG/DL — LOW (ref 8.4–10.5)
CHLORIDE SERPL-SCNC: 106 MMOL/L — SIGNIFICANT CHANGE UP (ref 98–110)
CK MB CFR SERPL CALC: 3.6 NG/ML — SIGNIFICANT CHANGE UP (ref 0.6–6.3)
CK SERPL-CCNC: 65 U/L — SIGNIFICANT CHANGE UP (ref 0–225)
CO2 SERPL-SCNC: 27 MMOL/L — SIGNIFICANT CHANGE UP (ref 17–32)
CREAT SERPL-MCNC: 0.9 MG/DL — SIGNIFICANT CHANGE UP (ref 0.7–1.5)
EGFR: 84 ML/MIN/1.73M2 — SIGNIFICANT CHANGE UP
EOSINOPHIL # BLD AUTO: 0.07 K/UL — SIGNIFICANT CHANGE UP (ref 0–0.7)
EOSINOPHIL NFR BLD AUTO: 1.1 % — SIGNIFICANT CHANGE UP (ref 0–8)
GAS PNL BLDV: 138 MMOL/L — SIGNIFICANT CHANGE UP (ref 136–145)
GAS PNL BLDV: SIGNIFICANT CHANGE UP
GAS PNL BLDV: SIGNIFICANT CHANGE UP
GLUCOSE SERPL-MCNC: 92 MG/DL — SIGNIFICANT CHANGE UP (ref 70–99)
HCO3 BLDV-SCNC: 30 MMOL/L — HIGH (ref 22–29)
HCT VFR BLD CALC: 38.1 % — LOW (ref 42–52)
HCT VFR BLDA CALC: 36 % — LOW (ref 39–51)
HGB BLD CALC-MCNC: 11.9 G/DL — LOW (ref 12.6–17.4)
HGB BLD-MCNC: 12 G/DL — LOW (ref 14–18)
IMM GRANULOCYTES NFR BLD AUTO: 0.3 % — SIGNIFICANT CHANGE UP (ref 0.1–0.3)
LACTATE BLDV-MCNC: 1.2 MMOL/L — SIGNIFICANT CHANGE UP (ref 0.5–2)
LYMPHOCYTES # BLD AUTO: 1.48 K/UL — SIGNIFICANT CHANGE UP (ref 1.2–3.4)
LYMPHOCYTES # BLD AUTO: 22.6 % — SIGNIFICANT CHANGE UP (ref 20.5–51.1)
MCHC RBC-ENTMCNC: 31.5 G/DL — LOW (ref 32–37)
MCHC RBC-ENTMCNC: 32.5 PG — HIGH (ref 27–31)
MCV RBC AUTO: 103.3 FL — HIGH (ref 80–94)
MONOCYTES # BLD AUTO: 0.56 K/UL — SIGNIFICANT CHANGE UP (ref 0.1–0.6)
MONOCYTES NFR BLD AUTO: 8.5 % — SIGNIFICANT CHANGE UP (ref 1.7–9.3)
NEUTROPHILS # BLD AUTO: 4.38 K/UL — SIGNIFICANT CHANGE UP (ref 1.4–6.5)
NEUTROPHILS NFR BLD AUTO: 66.9 % — SIGNIFICANT CHANGE UP (ref 42.2–75.2)
NRBC # BLD: 0 /100 WBCS — SIGNIFICANT CHANGE UP (ref 0–0)
NT-PROBNP SERPL-SCNC: 8407 PG/ML — HIGH (ref 0–300)
PCO2 BLDV: 50 MMHG — SIGNIFICANT CHANGE UP (ref 42–55)
PH BLDV: 7.39 — SIGNIFICANT CHANGE UP (ref 7.32–7.43)
PLATELET # BLD AUTO: 173 K/UL — SIGNIFICANT CHANGE UP (ref 130–400)
PMV BLD: 13.5 FL — HIGH (ref 7.4–10.4)
PO2 BLDV: 25 MMHG — SIGNIFICANT CHANGE UP (ref 25–45)
POTASSIUM BLDV-SCNC: 3.6 MMOL/L — SIGNIFICANT CHANGE UP (ref 3.5–5.1)
POTASSIUM SERPL-MCNC: 3.6 MMOL/L — SIGNIFICANT CHANGE UP (ref 3.5–5)
POTASSIUM SERPL-SCNC: 3.6 MMOL/L — SIGNIFICANT CHANGE UP (ref 3.5–5)
PROT SERPL-MCNC: 6.4 G/DL — SIGNIFICANT CHANGE UP (ref 6–8)
RBC # BLD: 3.69 M/UL — LOW (ref 4.7–6.1)
RBC # FLD: 17.7 % — HIGH (ref 11.5–14.5)
SAO2 % BLDV: 37 % — LOW (ref 67–88)
SODIUM SERPL-SCNC: 145 MMOL/L — SIGNIFICANT CHANGE UP (ref 135–146)
TROPONIN T, HIGH SENSITIVITY RESULT: 39 NG/L — HIGH (ref 6–21)
WBC # BLD: 6.55 K/UL — SIGNIFICANT CHANGE UP (ref 4.8–10.8)
WBC # FLD AUTO: 6.55 K/UL — SIGNIFICANT CHANGE UP (ref 4.8–10.8)

## 2024-12-26 PROCEDURE — 92611 MOTION FLUOROSCOPY/SWALLOW: CPT | Mod: GN

## 2024-12-26 PROCEDURE — 92526 ORAL FUNCTION THERAPY: CPT | Mod: GN

## 2024-12-26 PROCEDURE — 80048 BASIC METABOLIC PNL TOTAL CA: CPT

## 2024-12-26 PROCEDURE — 84100 ASSAY OF PHOSPHORUS: CPT

## 2024-12-26 PROCEDURE — 84484 ASSAY OF TROPONIN QUANT: CPT

## 2024-12-26 PROCEDURE — 82553 CREATINE MB FRACTION: CPT

## 2024-12-26 PROCEDURE — 36415 COLL VENOUS BLD VENIPUNCTURE: CPT

## 2024-12-26 PROCEDURE — 74230 X-RAY XM SWLNG FUNCJ C+: CPT

## 2024-12-26 PROCEDURE — 92610 EVALUATE SWALLOWING FUNCTION: CPT | Mod: GN

## 2024-12-26 PROCEDURE — 82550 ASSAY OF CK (CPK): CPT

## 2024-12-26 PROCEDURE — 71045 X-RAY EXAM CHEST 1 VIEW: CPT

## 2024-12-26 PROCEDURE — 93005 ELECTROCARDIOGRAM TRACING: CPT

## 2024-12-26 PROCEDURE — 83735 ASSAY OF MAGNESIUM: CPT

## 2024-12-26 PROCEDURE — 85025 COMPLETE CBC W/AUTO DIFF WBC: CPT

## 2024-12-26 PROCEDURE — 93307 TTE W/O DOPPLER COMPLETE: CPT

## 2024-12-26 PROCEDURE — 80053 COMPREHEN METABOLIC PANEL: CPT

## 2024-12-26 RX ORDER — MIRTAZAPINE 7.5 MG/1
15 TABLET, FILM COATED ORAL AT BEDTIME
Refills: 0 | Status: DISCONTINUED | OUTPATIENT
Start: 2024-12-26 | End: 2024-12-26

## 2024-12-26 RX ORDER — PANTOPRAZOLE 40 MG/1
40 TABLET, DELAYED RELEASE ORAL
Refills: 0 | Status: DISCONTINUED | OUTPATIENT
Start: 2024-12-26 | End: 2024-12-30

## 2024-12-26 RX ORDER — LISINOPRIL 30 MG/1
10 TABLET ORAL DAILY
Refills: 0 | Status: DISCONTINUED | OUTPATIENT
Start: 2024-12-26 | End: 2024-12-30

## 2024-12-26 RX ORDER — ESCITALOPRAM OXALATE 10 MG/1
20 TABLET ORAL DAILY
Refills: 0 | Status: DISCONTINUED | OUTPATIENT
Start: 2024-12-26 | End: 2024-12-26

## 2024-12-26 RX ORDER — ACETAMINOPHEN 80 MG/.8ML
650 SOLUTION/ DROPS ORAL EVERY 6 HOURS
Refills: 0 | Status: DISCONTINUED | OUTPATIENT
Start: 2024-12-26 | End: 2024-12-30

## 2024-12-26 RX ORDER — TAMSULOSIN HYDROCHLORIDE 0.4 MG/1
0.4 CAPSULE ORAL AT BEDTIME
Refills: 0 | Status: DISCONTINUED | OUTPATIENT
Start: 2024-12-26 | End: 2024-12-30

## 2024-12-26 RX ORDER — FUROSEMIDE 20 MG
20 TABLET ORAL ONCE
Refills: 0 | Status: COMPLETED | OUTPATIENT
Start: 2024-12-26 | End: 2024-12-26

## 2024-12-26 RX ORDER — ATORVASTATIN CALCIUM 40 MG/1
80 TABLET, FILM COATED ORAL AT BEDTIME
Refills: 0 | Status: DISCONTINUED | OUTPATIENT
Start: 2024-12-26 | End: 2024-12-30

## 2024-12-26 RX ORDER — ASPIRIN 81 MG
81 TABLET, DELAYED RELEASE (ENTERIC COATED) ORAL DAILY
Refills: 0 | Status: DISCONTINUED | OUTPATIENT
Start: 2024-12-26 | End: 2024-12-30

## 2024-12-26 RX ORDER — FLUDROCORTISONE ACETATE 0.1 MG/1
0.1 TABLET ORAL DAILY
Refills: 0 | Status: DISCONTINUED | OUTPATIENT
Start: 2024-12-26 | End: 2024-12-30

## 2024-12-26 RX ORDER — CLOPIDOGREL BISULFATE 75 MG/1
75 TABLET, FILM COATED ORAL DAILY
Refills: 0 | Status: DISCONTINUED | OUTPATIENT
Start: 2024-12-26 | End: 2024-12-30

## 2024-12-26 RX ORDER — FUROSEMIDE 20 MG
40 TABLET ORAL
Refills: 0 | Status: DISCONTINUED | OUTPATIENT
Start: 2024-12-26 | End: 2024-12-29

## 2024-12-26 RX ORDER — ENOXAPARIN SODIUM 60 MG/.6ML
40 INJECTION INTRAVENOUS; SUBCUTANEOUS EVERY 24 HOURS
Refills: 0 | Status: DISCONTINUED | OUTPATIENT
Start: 2024-12-26 | End: 2024-12-30

## 2024-12-26 RX ORDER — MIRTAZAPINE 7.5 MG/1
1 TABLET, FILM COATED ORAL
Refills: 0 | DISCHARGE

## 2024-12-26 RX ADMIN — Medication 40 MILLIGRAM(S): at 13:47

## 2024-12-26 RX ADMIN — ENOXAPARIN SODIUM 40 MILLIGRAM(S): 60 INJECTION INTRAVENOUS; SUBCUTANEOUS at 10:29

## 2024-12-26 RX ADMIN — CLOPIDOGREL BISULFATE 75 MILLIGRAM(S): 75 TABLET, FILM COATED ORAL at 10:28

## 2024-12-26 RX ADMIN — Medication 20 MILLIGRAM(S): at 01:57

## 2024-12-26 RX ADMIN — Medication 81 MILLIGRAM(S): at 10:28

## 2024-12-26 RX ADMIN — ATORVASTATIN CALCIUM 80 MILLIGRAM(S): 40 TABLET, FILM COATED ORAL at 21:38

## 2024-12-26 RX ADMIN — TAMSULOSIN HYDROCHLORIDE 0.4 MILLIGRAM(S): 0.4 CAPSULE ORAL at 21:38

## 2024-12-26 NOTE — ED ADULT NURSE NOTE - HOW OFTEN DO YOU HAVE A DRINK CONTAINING ALCOHOL?
Monthly or less Had general discussion with patient regarding code status. Patient has documentation with her naming her cousin as her HCP. Patient expresses that she would like CPR and a trial of intubation but would not want to live on a ventilator long-term without chance of recovery.

## 2024-12-26 NOTE — H&P ADULT - HISTORY OF PRESENT ILLNESS
HISTORY OF PRESENT ILLNESS & PAST MEDICAL HISTORY  84-year-old male past medical history HTN, HLD, GERD, anemia, TBI, splenectomy, skin CA s/p excision and radiation, Hep C (treated), ex-smoker quit 50 years ago, and CVA coming with complaints of shortness of breath.  Past 2 weeks intermittent episodes of shortness of breath, worsened with exertion.  Intermittently feels like something is stuck in his throat causing him decreased ability to breathe.  Denies leg swelling.  No cough. Denies any fever, chills, nausea, vomiting, CP, changes in urination, or changes in bowel movements.    VITALS & PHYSICAL EXAMINATION  Vitals in the ED  T(F): 97.8, Max: 97.8 (23:57)  HR: 91 (78 - 91)  BP: 151/93 (136/93 - 151/93)  RR: 18 (18 - 19)  SpO2: 99% (98% - 100%) on 3L NC     GEN: NAD   HEENT: EOMI, MMM  RESP: CTAB  CARD: S1 S2 RRR  ABD: NTND   EXT: 2+ JAZMINE bilateral  NEURO: AOx3 dysarthric baseline   PSYCH: appropriate    LABS                           11.7   7.46  )-----------( 173    .7                36.5     141  |  106  |  21  ----------------------------( 104      AG: x   3.9   |  28  |  0.9    Ca: 9.0   Phos: x    Mg: x     Protein, Total: 6.7 / Albumin: 3.4 /  T. Bili 0.8 / D. Bili x  /  AST 24 /  ALT 13 /      BNP 8000      IMAGING  ECG sinus w/ RBBB and PAC unchanged to prior   CTPE:  no PE  interlobular septal thickening  bilateral small-moderate effusion  mild emphysematous changes

## 2024-12-26 NOTE — ED ADULT NURSE NOTE - NSICDXPASTSURGICALHX_GEN_ALL_CORE_FT
[FreeTextEntry1] : Problem List\par 1. Growing fibroids \par 2. Elevated AFP 10.1 ON 10/14/22 \par \par Previous Therapy\par 1. CT A/P 9/19/22\par     a) right adrenal adenoma. This dose not require imaging follow up. Fibroid uterus. Redundant colon with large amount of retained fecal maternal in the transverse colon. \par 2. Pelvic US 10/6/22 ( compared to 3/2/22)\par     a) uterus 9.7cm\par     b) endometrium 0.5cm maximum thickness \par     c) Th efibroids have increased in size from the prior examination and additional fibroid was seen on transabdominal images in the anterior aspect uterus  measuring 3.8cm. \par     d) Right ovary unremarkable left ovary not visualized \par \par 3. CEA, , HCG, negative 10/14/22\par \par 4. S/P Endometrial biopsy 10/31/22- benign 
PAST SURGICAL HISTORY:  H/O spleen injury     History of skin cancer

## 2024-12-26 NOTE — SWALLOW BEDSIDE ASSESSMENT ADULT - PHARYNGEAL PHASE
Complaints of pharyngeal stasis/Multiple swallows Throat clear post oral intake/Complaints of pharyngeal stasis/Multiple swallows

## 2024-12-26 NOTE — SWALLOW BEDSIDE ASSESSMENT ADULT - MODE OF PRESENTATION
Writer attempted to assess again , pt hung up . Writer called again and pt declined assessment and reported that she doesn't want to talk to anyone . ED PA notified and resources placed in AVS   spoon/self fed/fed by clinician cup/spoon/straw/self fed/fed by clinician

## 2024-12-26 NOTE — H&P ADULT - ASSESSMENT
84-year-old male past medical history HTN, HLD, GERD, anemia, TBI, splenectomy, orthostatic hypotension, skin CA s/p excision and radiation, Hep C (treated) and CVA coming with complaints of shortness of breath. Admitted for suspected CHF exacerbation    New-onset CHF  HTN  HLD  Orthostatic Hypotension  -telemetry monitoring   -start Lasix 40 mg IV bid  -monitor I&O target -ve 2L/day  -monitor lytes  -CXR & ECG in AM  -TTE   -c/w Florinef, compression stockings  -c/w lisinopril 10 mg qd    T9 Compression Fx  Recurrent Mechanical Falls  -had multiple falls recently, last one 1 week ago  -asymptomatic no pain  -PT eval  -OP f/u    HO Cerebellar/Basal Ganglia Infarcts 2019  Chronic Dysphagia  -still on DAPT; continue for now with OP f/u  -S&S eval    GERD  -c/w PPI    DVT prophylaxis: enoxaparin  GI prophylaxis: PPI  Diet: DASH  Code status: full code    Activity: AAT/PT  Dispo: from home

## 2024-12-26 NOTE — ED PROVIDER NOTE - PHYSICAL EXAMINATION
CONSTITUTIONAL: NAD  SKIN: Warm dry  HEAD: NCAT  EYES: NL inspection  ENT: MMM  CARD: RRR  RESP: CTAB  ABD: Soft, non tender, no rebound or guarding   EXT: no pedal edema

## 2024-12-26 NOTE — ED PROVIDER NOTE - OBJECTIVE STATEMENT
84-year-old male past medical history HTN, HLD, GERD, anemia, TBI, splenectomy, and CVA coming with complaints of shortness of breath.  Past 2 weeks intermittent episodes of shortness of breath, worsened with exertion.  Intermittently feels like something is stuck in his throat causing him decreased ability to breathe.  Denies leg swelling.  No cough. Denies any fever, chills, nausea, vomiting, CP, changes in urination, or changes in bowel movements.

## 2024-12-26 NOTE — ED PROVIDER NOTE - CLINICAL SUMMARY MEDICAL DECISION MAKING FREE TEXT BOX
84-year-old male history of hypertension dyslipidemia GERD anemia severe preservation of shortness of breath.  Patient reports intermittent shortness breath last 2 weeks worse with exertion.  He also endorses feeling some nystagmus throat but is able to tolerate p.o.  No exam patient normal posterior oropharynx normal speech S1-S2 CTAB soft nontender all EXTR edema.  Here EKG sinus with PACs left axis right bundle branch.  QTc similar to previous.  CT scan demonstrates bilateral small to moderate pleural effusions with adjacent atelectasis and interlobular septal thickening.  Patient given Lasix admitted to telemetry.

## 2024-12-27 ENCOUNTER — RESULT REVIEW (OUTPATIENT)
Age: 84
End: 2024-12-27

## 2024-12-27 LAB
ALBUMIN SERPL ELPH-MCNC: 3.3 G/DL — LOW (ref 3.5–5.2)
ALP SERPL-CCNC: 99 U/L — SIGNIFICANT CHANGE UP (ref 30–115)
ALT FLD-CCNC: 11 U/L — SIGNIFICANT CHANGE UP (ref 0–41)
ANION GAP SERPL CALC-SCNC: 10 MMOL/L — SIGNIFICANT CHANGE UP (ref 7–14)
ANION GAP SERPL CALC-SCNC: 9 MMOL/L — SIGNIFICANT CHANGE UP (ref 7–14)
AST SERPL-CCNC: 19 U/L — SIGNIFICANT CHANGE UP (ref 0–41)
BASOPHILS # BLD AUTO: 0.05 K/UL — SIGNIFICANT CHANGE UP (ref 0–0.2)
BASOPHILS NFR BLD AUTO: 0.7 % — SIGNIFICANT CHANGE UP (ref 0–1)
BILIRUB SERPL-MCNC: 1.2 MG/DL — SIGNIFICANT CHANGE UP (ref 0.2–1.2)
BUN SERPL-MCNC: 13 MG/DL — SIGNIFICANT CHANGE UP (ref 10–20)
BUN SERPL-MCNC: 15 MG/DL — SIGNIFICANT CHANGE UP (ref 10–20)
CALCIUM SERPL-MCNC: 8.2 MG/DL — LOW (ref 8.4–10.4)
CALCIUM SERPL-MCNC: 8.5 MG/DL — SIGNIFICANT CHANGE UP (ref 8.4–10.5)
CHLORIDE SERPL-SCNC: 101 MMOL/L — SIGNIFICANT CHANGE UP (ref 98–110)
CHLORIDE SERPL-SCNC: 104 MMOL/L — SIGNIFICANT CHANGE UP (ref 98–110)
CO2 SERPL-SCNC: 28 MMOL/L — SIGNIFICANT CHANGE UP (ref 17–32)
CO2 SERPL-SCNC: 28 MMOL/L — SIGNIFICANT CHANGE UP (ref 17–32)
CREAT SERPL-MCNC: 0.8 MG/DL — SIGNIFICANT CHANGE UP (ref 0.7–1.5)
CREAT SERPL-MCNC: 0.9 MG/DL — SIGNIFICANT CHANGE UP (ref 0.7–1.5)
EGFR: 84 ML/MIN/1.73M2 — SIGNIFICANT CHANGE UP
EGFR: 87 ML/MIN/1.73M2 — SIGNIFICANT CHANGE UP
EOSINOPHIL # BLD AUTO: 0.12 K/UL — SIGNIFICANT CHANGE UP (ref 0–0.7)
EOSINOPHIL NFR BLD AUTO: 1.6 % — SIGNIFICANT CHANGE UP (ref 0–8)
GLUCOSE SERPL-MCNC: 111 MG/DL — HIGH (ref 70–99)
GLUCOSE SERPL-MCNC: 97 MG/DL — SIGNIFICANT CHANGE UP (ref 70–99)
HCT VFR BLD CALC: 36.5 % — LOW (ref 42–52)
HGB BLD-MCNC: 11.8 G/DL — LOW (ref 14–18)
IMM GRANULOCYTES NFR BLD AUTO: 0.4 % — HIGH (ref 0.1–0.3)
LYMPHOCYTES # BLD AUTO: 1.88 K/UL — SIGNIFICANT CHANGE UP (ref 1.2–3.4)
LYMPHOCYTES # BLD AUTO: 25.1 % — SIGNIFICANT CHANGE UP (ref 20.5–51.1)
MAGNESIUM SERPL-MCNC: 1.6 MG/DL — LOW (ref 1.8–2.4)
MAGNESIUM SERPL-MCNC: 1.9 MG/DL — SIGNIFICANT CHANGE UP (ref 1.8–2.4)
MCHC RBC-ENTMCNC: 32.3 G/DL — SIGNIFICANT CHANGE UP (ref 32–37)
MCHC RBC-ENTMCNC: 33.4 PG — HIGH (ref 27–31)
MCV RBC AUTO: 103.4 FL — HIGH (ref 80–94)
MONOCYTES # BLD AUTO: 0.74 K/UL — HIGH (ref 0.1–0.6)
MONOCYTES NFR BLD AUTO: 9.9 % — HIGH (ref 1.7–9.3)
NEUTROPHILS # BLD AUTO: 4.66 K/UL — SIGNIFICANT CHANGE UP (ref 1.4–6.5)
NEUTROPHILS NFR BLD AUTO: 62.3 % — SIGNIFICANT CHANGE UP (ref 42.2–75.2)
NRBC # BLD: 0 /100 WBCS — SIGNIFICANT CHANGE UP (ref 0–0)
PHOSPHATE SERPL-MCNC: 3.1 MG/DL — SIGNIFICANT CHANGE UP (ref 2.1–4.9)
PLATELET # BLD AUTO: 160 K/UL — SIGNIFICANT CHANGE UP (ref 130–400)
PMV BLD: 14.2 FL — HIGH (ref 7.4–10.4)
POTASSIUM SERPL-MCNC: 3.1 MMOL/L — LOW (ref 3.5–5)
POTASSIUM SERPL-MCNC: 3.5 MMOL/L — SIGNIFICANT CHANGE UP (ref 3.5–5)
POTASSIUM SERPL-SCNC: 3.1 MMOL/L — LOW (ref 3.5–5)
POTASSIUM SERPL-SCNC: 3.5 MMOL/L — SIGNIFICANT CHANGE UP (ref 3.5–5)
PROT SERPL-MCNC: 6.3 G/DL — SIGNIFICANT CHANGE UP (ref 6–8)
RBC # BLD: 3.53 M/UL — LOW (ref 4.7–6.1)
RBC # FLD: 17.6 % — HIGH (ref 11.5–14.5)
SODIUM SERPL-SCNC: 139 MMOL/L — SIGNIFICANT CHANGE UP (ref 135–146)
SODIUM SERPL-SCNC: 141 MMOL/L — SIGNIFICANT CHANGE UP (ref 135–146)
WBC # BLD: 7.48 K/UL — SIGNIFICANT CHANGE UP (ref 4.8–10.8)
WBC # FLD AUTO: 7.48 K/UL — SIGNIFICANT CHANGE UP (ref 4.8–10.8)

## 2024-12-27 PROCEDURE — 71045 X-RAY EXAM CHEST 1 VIEW: CPT | Mod: 26

## 2024-12-27 PROCEDURE — 93306 TTE W/DOPPLER COMPLETE: CPT | Mod: 26

## 2024-12-27 PROCEDURE — 74230 X-RAY XM SWLNG FUNCJ C+: CPT | Mod: 26

## 2024-12-27 PROCEDURE — 93010 ELECTROCARDIOGRAM REPORT: CPT

## 2024-12-27 RX ORDER — POTASSIUM CHLORIDE 600 MG/1
20 TABLET, FILM COATED, EXTENDED RELEASE ORAL
Refills: 0 | Status: COMPLETED | OUTPATIENT
Start: 2024-12-27 | End: 2024-12-27

## 2024-12-27 RX ORDER — MAGNESIUM SULFATE 500 MG/ML
2 INJECTION, SOLUTION INTRAMUSCULAR; INTRAVENOUS
Refills: 0 | Status: COMPLETED | OUTPATIENT
Start: 2024-12-27 | End: 2024-12-28

## 2024-12-27 RX ADMIN — ATORVASTATIN CALCIUM 80 MILLIGRAM(S): 40 TABLET, FILM COATED ORAL at 22:04

## 2024-12-27 RX ADMIN — POTASSIUM CHLORIDE 50 MILLIEQUIVALENT(S): 600 TABLET, FILM COATED, EXTENDED RELEASE ORAL at 17:14

## 2024-12-27 RX ADMIN — FLUDROCORTISONE ACETATE 0.1 MILLIGRAM(S): 0.1 TABLET ORAL at 05:11

## 2024-12-27 RX ADMIN — Medication 40 MILLIGRAM(S): at 05:11

## 2024-12-27 RX ADMIN — PANTOPRAZOLE 40 MILLIGRAM(S): 40 TABLET, DELAYED RELEASE ORAL at 05:11

## 2024-12-27 RX ADMIN — TAMSULOSIN HYDROCHLORIDE 0.4 MILLIGRAM(S): 0.4 CAPSULE ORAL at 22:04

## 2024-12-27 RX ADMIN — Medication 40 MILLIGRAM(S): at 13:14

## 2024-12-27 RX ADMIN — Medication 81 MILLIGRAM(S): at 11:35

## 2024-12-27 RX ADMIN — MAGNESIUM SULFATE 25 GRAM(S): 500 INJECTION, SOLUTION INTRAMUSCULAR; INTRAVENOUS at 22:04

## 2024-12-27 RX ADMIN — POTASSIUM CHLORIDE 50 MILLIEQUIVALENT(S): 600 TABLET, FILM COATED, EXTENDED RELEASE ORAL at 15:33

## 2024-12-27 RX ADMIN — POTASSIUM CHLORIDE 50 MILLIEQUIVALENT(S): 600 TABLET, FILM COATED, EXTENDED RELEASE ORAL at 19:49

## 2024-12-27 RX ADMIN — LISINOPRIL 10 MILLIGRAM(S): 30 TABLET ORAL at 05:11

## 2024-12-27 RX ADMIN — CLOPIDOGREL BISULFATE 75 MILLIGRAM(S): 75 TABLET, FILM COATED ORAL at 11:35

## 2024-12-27 RX ADMIN — ENOXAPARIN SODIUM 40 MILLIGRAM(S): 60 INJECTION INTRAVENOUS; SUBCUTANEOUS at 10:27

## 2024-12-27 NOTE — SWALLOW BEDSIDE ASSESSMENT ADULT - SWALLOW EVAL: RECOMMENDED FEEDING/EATING TECHNIQUES
no straws/position upright (90 degrees)/small sips/bites
alternate food with liquid/oral hygiene/position upright (90 degrees)/small sips/bites

## 2024-12-27 NOTE — SWALLOW VFSS/MBS ASSESSMENT ADULT - DIAGNOSTIC IMPRESSIONS
Moderate oral dysphagia. Moderate pharyngeal dysphagia for thins. Mild pharyngeal dysphagia for mildly thick, moderately thick, and solids.

## 2024-12-27 NOTE — SWALLOW BEDSIDE ASSESSMENT ADULT - SLP PERTINENT HISTORY OF CURRENT PROBLEM
84-year-old male past medical history HTN, HLD, GERD, anemia, TBI, splenectomy, skin CA s/p excision and radiation, Hep C (treated), ex-smoker quit 50 years ago, and CVA coming with complaints of shortness of breath.  Past 2 weeks intermittent episodes of shortness of breath, worsened with exertion.  Intermittently feels like something is stuck in his throat causing him decreased ability to breathe.  Denies leg swelling.  No cough. Denies any fever, chills, nausea, vomiting, CP, changes in urination, or changes in bowel movements.
84-year-old male past medical history HTN, HLD, GERD, anemia, TBI, splenectomy, skin CA s/p excision and radiation, Hep C (treated), ex-smoker quit 50 years ago, and CVA coming with complaints of shortness of breath.  Past 2 weeks intermittent episodes of shortness of breath, worsened with exertion.  Intermittently feels like something is stuck in his throat causing him decreased ability to breathe.  Denies leg swelling.  No cough. Denies any fever, chills, nausea, vomiting, CP, changes in urination, or changes in bowel movements.

## 2024-12-27 NOTE — SWALLOW VFSS/MBS ASSESSMENT ADULT - ORAL PHASE COMMENTS
+mild-mod oral weakness characterized by lingual pumping, poor mastication pattern and piecemeal deglutition.

## 2024-12-27 NOTE — SWALLOW BEDSIDE ASSESSMENT ADULT - COMMENTS
MBSS 12/27:  Moderate oral dysphagia. Moderate pharyngeal dysphagia for thins. Mild pharyngeal dysphagia for mildly thick, moderately thick, and solids.  Rec Soft & bite sized/thins, NO straws

## 2024-12-27 NOTE — SWALLOW VFSS/MBS ASSESSMENT ADULT - PHARYNGEAL PHASE COMMENTS
Moderate pharyngeal dysphagia for thins with aspiration of thins via straw sips. Mild pharyngeal dysphagia for mildly thick, moderately thick, and solids.

## 2024-12-27 NOTE — SWALLOW VFSS/MBS ASSESSMENT ADULT - ROSENBEK'S PENETRATION ASPIRATION SCALE
for thins via straw sip. Not consistent/(7) contrast passes glottis, visible subglottic residue remains despite patient’s response (aspiration)

## 2024-12-27 NOTE — PROGRESS NOTE ADULT - ASSESSMENT
84-year-old male past medical history HTN, HLD, GERD, anemia, TBI, splenectomy, orthostatic hypotension, skin CA s/p excision and radiation, Hep C (treated) and CVA coming with complaints of shortness of breath. Admitted for suspected CHF exacerbation    #Suspected New-onset CHF  #HTN  #HLD  #Orthostatic Hypotension  - admission EKG - Diagnosis Line Sinus rhythm withmarked sinus arrhythmia with Premature atrial complexes with   Aberrant conduction. Left axis deviation. Right bundle branch block  -telemetry monitoring   -c/w Lasix 40 mg IV bid  -monitor I&O target -ve 2L/day  -monitor lytes  -f/ u CXR & ECG in AM  -f/u TTE   -CTPE - No CT evidence of acute pulmonary emboli.  T9 compression fracture,  new. Correlate with point tenderness and trauma   history. Bilateral small-to-moderate pleural effusions with adjacent   consolidation/atelectasis. Interlobular septal thickening reflecting   lymphangitic congestion. Mild anasarca.  -c/w Florinef, compression stockings  -c/w lisinopril 10 mg qd    T9 Compression Fx  Recurrent Mechanical Falls  -had multiple falls recently, last one 1 week ago  -asymptomatic no pain  -PT eval  -OP f/u    HO Cerebellar/Basal Ganglia Infarcts 2019  Chronic Dysphagia  -still on DAPT; continue for now with OP f/u  -S&S eval    GERD  -c/w PPI    DVT prophylaxis: enoxaparin  GI prophylaxis: PPI  Diet: DASH  Code status: full code    Activity: AAT/PT  Dispo: from home    84-year-old male past medical history HTN, HLD, GERD, anemia, TBI, splenectomy, orthostatic hypotension, skin CA s/p excision and radiation, Hep C (treated) and CVA coming with complaints of shortness of breath. Admitted for suspected CHF exacerbation    #Suspected New-onset CHF  #HTN  #HLD  #Orthostatic Hypotension  - admission EKG - Diagnosis Line Sinus rhythm withmarked sinus arrhythmia with Premature atrial complexes with   Aberrant conduction. Left axis deviation. Right bundle branch block  -telemetry monitoring   -c/w Lasix 40 mg IV bid  -monitor I&O target -ve 2L/day  -monitor lytes  -CXR in AM - CHF, bilateral pleural effusions. Redemonstration cardiomegaly  -ECG today - Sinus rhythm with Premature atrial complexes with Aberrant conduction  Right bundle branch block, Left anterior fascicular block  -f/u TTE   -CTPE - No CT evidence of acute pulmonary emboli.  T9 compression fracture,  new. Correlate with point tenderness and trauma   history. Bilateral small-to-moderate pleural effusions with adjacent   consolidation/atelectasis. Interlobular septal thickening reflecting   lymphangitic congestion. Mild anasarca.  -c/w Florinef, compression stockings  -c/w lisinopril 10 mg qd    T9 Compression Fx  Recurrent Mechanical Falls  -had multiple falls recently, last one 1 week ago  -asymptomatic no pain  -PT eval  -OP f/u    HO Cerebellar/Basal Ganglia Infarcts 2019  Chronic Dysphagia  -still on DAPT; continue for now with OP f/u  -S&S eval    GERD  -c/w PPI    DVT prophylaxis: enoxaparin  GI prophylaxis: PPI  Diet: DASH  Code status: full code    Activity: AAT/PT  Dispo: from home     pending: f/u TTE, BMP and Mg @ 4pm

## 2024-12-27 NOTE — PHYSICAL THERAPY INITIAL EVALUATION ADULT - SPECIFY REASON(S)
PT attempted to see the patient for the eval. Patient is currently off the unit, went for echo. PT will f/u when appropriate.

## 2024-12-27 NOTE — SWALLOW VFSS/MBS ASSESSMENT ADULT - SLP PERTINENT HISTORY OF CURRENT PROBLEM
84-year-old male past medical history HTN, HLD, GERD, anemia, TBI, splenectomy, skin CA s/p excision and radiation, Hep C (treated), ex-smoker quit 50 years ago, and CVA coming with complaints of shortness of breath.  Past 2 weeks intermittent episodes of shortness of breath, worsened with exertion.  Intermittently feels like something is stuck in his throat causing him decreased ability to breathe.  Denies leg swelling.  No cough. Denies any fever, chills, nausea, vomiting, CP, changes in urination, or changes in bowel movements.

## 2024-12-27 NOTE — SWALLOW BEDSIDE ASSESSMENT ADULT - SWALLOW EVAL: DIAGNOSIS
Moderate oral dysphagia. Moderate pharyngeal dysphagia for thins. Mild pharyngeal dysphagia for mildly thick, moderately thick, and solids.
C/o of pharyngeal stasis. Throat clearing post PO intake. Overall functional toleration of puree, minced & moist, thin liquids, and mildly thick liquids w/o overt s/s of penetration/aspiration. Prolonged mastication of soft & bite sized.

## 2024-12-28 LAB
ALBUMIN SERPL ELPH-MCNC: 3.1 G/DL — LOW (ref 3.5–5.2)
ALP SERPL-CCNC: 98 U/L — SIGNIFICANT CHANGE UP (ref 30–115)
ALT FLD-CCNC: 10 U/L — SIGNIFICANT CHANGE UP (ref 0–41)
ANION GAP SERPL CALC-SCNC: 9 MMOL/L — SIGNIFICANT CHANGE UP (ref 7–14)
ANION GAP SERPL CALC-SCNC: 9 MMOL/L — SIGNIFICANT CHANGE UP (ref 7–14)
AST SERPL-CCNC: 19 U/L — SIGNIFICANT CHANGE UP (ref 0–41)
BASOPHILS # BLD AUTO: 0.04 K/UL — SIGNIFICANT CHANGE UP (ref 0–0.2)
BASOPHILS NFR BLD AUTO: 0.5 % — SIGNIFICANT CHANGE UP (ref 0–1)
BILIRUB SERPL-MCNC: 1.2 MG/DL — SIGNIFICANT CHANGE UP (ref 0.2–1.2)
BUN SERPL-MCNC: 13 MG/DL — SIGNIFICANT CHANGE UP (ref 10–20)
BUN SERPL-MCNC: 14 MG/DL — SIGNIFICANT CHANGE UP (ref 10–20)
CALCIUM SERPL-MCNC: 7.8 MG/DL — LOW (ref 8.4–10.4)
CALCIUM SERPL-MCNC: 8.3 MG/DL — LOW (ref 8.4–10.5)
CHLORIDE SERPL-SCNC: 100 MMOL/L — SIGNIFICANT CHANGE UP (ref 98–110)
CHLORIDE SERPL-SCNC: 103 MMOL/L — SIGNIFICANT CHANGE UP (ref 98–110)
CO2 SERPL-SCNC: 29 MMOL/L — SIGNIFICANT CHANGE UP (ref 17–32)
CO2 SERPL-SCNC: 32 MMOL/L — SIGNIFICANT CHANGE UP (ref 17–32)
CREAT SERPL-MCNC: 0.8 MG/DL — SIGNIFICANT CHANGE UP (ref 0.7–1.5)
CREAT SERPL-MCNC: 0.9 MG/DL — SIGNIFICANT CHANGE UP (ref 0.7–1.5)
EGFR: 84 ML/MIN/1.73M2 — SIGNIFICANT CHANGE UP
EGFR: 87 ML/MIN/1.73M2 — SIGNIFICANT CHANGE UP
EOSINOPHIL # BLD AUTO: 0.21 K/UL — SIGNIFICANT CHANGE UP (ref 0–0.7)
EOSINOPHIL NFR BLD AUTO: 2.9 % — SIGNIFICANT CHANGE UP (ref 0–8)
GLUCOSE SERPL-MCNC: 122 MG/DL — HIGH (ref 70–99)
GLUCOSE SERPL-MCNC: 90 MG/DL — SIGNIFICANT CHANGE UP (ref 70–99)
HCT VFR BLD CALC: 36.9 % — LOW (ref 42–52)
HGB BLD-MCNC: 11.7 G/DL — LOW (ref 14–18)
IMM GRANULOCYTES NFR BLD AUTO: 0.3 % — SIGNIFICANT CHANGE UP (ref 0.1–0.3)
LYMPHOCYTES # BLD AUTO: 1.56 K/UL — SIGNIFICANT CHANGE UP (ref 1.2–3.4)
LYMPHOCYTES # BLD AUTO: 21.4 % — SIGNIFICANT CHANGE UP (ref 20.5–51.1)
MAGNESIUM SERPL-MCNC: 2 MG/DL — SIGNIFICANT CHANGE UP (ref 1.8–2.4)
MAGNESIUM SERPL-MCNC: 2.4 MG/DL — SIGNIFICANT CHANGE UP (ref 1.8–2.4)
MCHC RBC-ENTMCNC: 31.7 G/DL — LOW (ref 32–37)
MCHC RBC-ENTMCNC: 32.6 PG — HIGH (ref 27–31)
MCV RBC AUTO: 102.8 FL — HIGH (ref 80–94)
MONOCYTES # BLD AUTO: 0.8 K/UL — HIGH (ref 0.1–0.6)
MONOCYTES NFR BLD AUTO: 11 % — HIGH (ref 1.7–9.3)
NEUTROPHILS # BLD AUTO: 4.65 K/UL — SIGNIFICANT CHANGE UP (ref 1.4–6.5)
NEUTROPHILS NFR BLD AUTO: 63.9 % — SIGNIFICANT CHANGE UP (ref 42.2–75.2)
NRBC # BLD: 0 /100 WBCS — SIGNIFICANT CHANGE UP (ref 0–0)
PHOSPHATE SERPL-MCNC: 2.9 MG/DL — SIGNIFICANT CHANGE UP (ref 2.1–4.9)
PLATELET # BLD AUTO: 154 K/UL — SIGNIFICANT CHANGE UP (ref 130–400)
PMV BLD: 14 FL — HIGH (ref 7.4–10.4)
POTASSIUM SERPL-MCNC: 3.2 MMOL/L — LOW (ref 3.5–5)
POTASSIUM SERPL-MCNC: 3.3 MMOL/L — LOW (ref 3.5–5)
POTASSIUM SERPL-SCNC: 3.2 MMOL/L — LOW (ref 3.5–5)
POTASSIUM SERPL-SCNC: 3.3 MMOL/L — LOW (ref 3.5–5)
PROT SERPL-MCNC: 6.4 G/DL — SIGNIFICANT CHANGE UP (ref 6–8)
RBC # BLD: 3.59 M/UL — LOW (ref 4.7–6.1)
RBC # FLD: 17.3 % — HIGH (ref 11.5–14.5)
SODIUM SERPL-SCNC: 141 MMOL/L — SIGNIFICANT CHANGE UP (ref 135–146)
SODIUM SERPL-SCNC: 141 MMOL/L — SIGNIFICANT CHANGE UP (ref 135–146)
WBC # BLD: 7.28 K/UL — SIGNIFICANT CHANGE UP (ref 4.8–10.8)
WBC # FLD AUTO: 7.28 K/UL — SIGNIFICANT CHANGE UP (ref 4.8–10.8)

## 2024-12-28 RX ORDER — METOPROLOL TARTRATE 50 MG
25 TABLET ORAL DAILY
Refills: 0 | Status: DISCONTINUED | OUTPATIENT
Start: 2024-12-28 | End: 2024-12-30

## 2024-12-28 RX ORDER — POTASSIUM CHLORIDE 600 MG/1
40 TABLET, FILM COATED, EXTENDED RELEASE ORAL EVERY 4 HOURS
Refills: 0 | Status: COMPLETED | OUTPATIENT
Start: 2024-12-28 | End: 2024-12-29

## 2024-12-28 RX ORDER — GUAIFENESIN/DEXTROMETHORPHAN 200-10MG/5
15 LIQUID (ML) ORAL THREE TIMES A DAY
Refills: 0 | Status: DISCONTINUED | OUTPATIENT
Start: 2024-12-28 | End: 2024-12-30

## 2024-12-28 RX ADMIN — LISINOPRIL 10 MILLIGRAM(S): 30 TABLET ORAL at 05:12

## 2024-12-28 RX ADMIN — Medication 25 MILLIGRAM(S): at 09:10

## 2024-12-28 RX ADMIN — Medication 40 MILLIGRAM(S): at 05:12

## 2024-12-28 RX ADMIN — PANTOPRAZOLE 40 MILLIGRAM(S): 40 TABLET, DELAYED RELEASE ORAL at 05:12

## 2024-12-28 RX ADMIN — POTASSIUM CHLORIDE 40 MILLIEQUIVALENT(S): 600 TABLET, FILM COATED, EXTENDED RELEASE ORAL at 21:02

## 2024-12-28 RX ADMIN — ENOXAPARIN SODIUM 40 MILLIGRAM(S): 60 INJECTION INTRAVENOUS; SUBCUTANEOUS at 12:01

## 2024-12-28 RX ADMIN — MAGNESIUM SULFATE 25 GRAM(S): 500 INJECTION, SOLUTION INTRAMUSCULAR; INTRAVENOUS at 00:14

## 2024-12-28 RX ADMIN — CLOPIDOGREL BISULFATE 75 MILLIGRAM(S): 75 TABLET, FILM COATED ORAL at 12:01

## 2024-12-28 RX ADMIN — ATORVASTATIN CALCIUM 80 MILLIGRAM(S): 40 TABLET, FILM COATED ORAL at 21:02

## 2024-12-28 RX ADMIN — Medication 40 MILLIGRAM(S): at 14:24

## 2024-12-28 RX ADMIN — TAMSULOSIN HYDROCHLORIDE 0.4 MILLIGRAM(S): 0.4 CAPSULE ORAL at 21:03

## 2024-12-28 RX ADMIN — FLUDROCORTISONE ACETATE 0.1 MILLIGRAM(S): 0.1 TABLET ORAL at 05:12

## 2024-12-28 RX ADMIN — Medication 81 MILLIGRAM(S): at 12:00

## 2024-12-28 NOTE — PROGRESS NOTE ADULT - ATTENDING COMMENTS
doing better, less dyspneic.  remains on 2L O2 NC but will try to wean off  TTE noted, Mod decreased LVEF , estimated at 40% by my read.    There is moderate to severe pulm HTN   no clinically signif valvular abn that need to be addressed in this 83 yo man.  will cont Lasix 40m IV q12 or now   start Toprol XL 25mg po q24 in am, needs better rate control   wean O2 if possible.
doing much better   exam much improved   Hypokalemia to be repleted   PLEASE KEEP K>4.0 AND MG>2.2  Wean off O2 and plan for d/c Mon am  willl decrease lasix to 40mg po q24 on discharge     plan d/c with his dtr Iveth in detail

## 2024-12-28 NOTE — PROGRESS NOTE ADULT - ATTENDING SUPERVISION STATEMENT
Patient requesting to have MRI later in August--new MRI order placed with new expiration date.     Gela Ny RN, MPH  Research Nurse, Movement Disorders     Resident

## 2024-12-28 NOTE — PROGRESS NOTE ADULT - ASSESSMENT
84-year-old male past medical history HTN, HLD, GERD, anemia, TBI, splenectomy, orthostatic hypotension, skin CA s/p excision and radiation, Hep C (treated) and CVA coming with complaints of shortness of breath. Admitted for suspected CHF exacerbation    #Suspected New-onset CHF  #HTN  #HLD  #Orthostatic Hypotension  - admission EKG - Diagnosis Line Sinus rhythm withmarked sinus arrhythmia with Premature atrial complexes with   Aberrant conduction. Left axis deviation. Right bundle branch block  -telemetry monitoring   -c/w Lasix 40 mg IV bid  -monitor I&O target -ve 2L/day  -monitor lytes  -CXR in AM - CHF, bilateral pleural effusions. Redemonstration cardiomegaly  -ECG - Sinus rhythm with Premature atrial complexes with Aberrant conduction  Right bundle branch block, Left anterior fascicular block  - TTE -  1. Moderately decreased global left ventricular systolic function.   2. LV Ejection Fraction by Palmer's Method with a biplane EF of 38 %.  -CTPE - No CT evidence of acute pulmonary emboli.  -c/w Florinef, compression stockings  -c/w lisinopril 10 mg qd  - start Toprol XL 25mg po q24 for improved rate control     T9 Compression Fx  Recurrent Mechanical Falls  - CTPE - No CT evidence of acute pulmonary emboli.  T9 compression fracture,  new. Correlate with point tenderness and trauma   history. Bilateral small-to-moderate pleural effusions with adjacent   consolidation/atelectasis. Interlobular septal thickening reflecting   lymphangitic congestion. Mild anasarca.  -had multiple falls recently, last one 1 week ago  -asymptomatic no pain  -PT eval  -OP f/u    HO Cerebellar/Basal Ganglia Infarcts 2019  Chronic Dysphagia  -still on DAPT; continue for now with OP f/u  -S&S eval    GERD  -c/w PPI    DVT prophylaxis: enoxaparin  GI prophylaxis: PPI  Diet: DASH  Code status: full code    Activity: AAT/PT  Dispo: from home    84-year-old male past medical history HTN, HLD, GERD, anemia, TBI, splenectomy, orthostatic hypotension, skin CA s/p excision and radiation, Hep C (treated) and CVA coming with complaints of shortness of breath. Admitted for suspected CHF exacerbation    #Suspected New-onset HFrEF  #HTN  #HLD  #Orthostatic Hypotension  - admission EKG - Diagnosis Line Sinus rhythm withmarked sinus arrhythmia with Premature atrial complexes with   Aberrant conduction. Left axis deviation. Right bundle branch block  -telemetry monitoring   -c/w Lasix 40 mg IV bid  -monitor I&O target -ve 2L/day  -monitor lytes  -CXR in AM - CHF, bilateral pleural effusions. Redemonstration cardiomegaly  -ECG - Sinus rhythm with Premature atrial complexes with Aberrant conduction  Right bundle branch block, Left anterior fascicular block  - TTE -  1. Moderately decreased global left ventricular systolic function.   2. LV Ejection Fraction by Palmer's Method with a biplane EF of 38 %.  -CTPE - No CT evidence of acute pulmonary emboli.  -c/w Florinef, compression stockings  -c/w lisinopril 10 mg qd  - start Toprol XL 25mg po q24 for improved rate control     T9 Compression Fx  Recurrent Mechanical Falls  - CTPE - No CT evidence of acute pulmonary emboli.  T9 compression fracture,  new. Correlate with point tenderness and trauma   history. Bilateral small-to-moderate pleural effusions with adjacent   consolidation/atelectasis. Interlobular septal thickening reflecting   lymphangitic congestion. Mild anasarca.  -had multiple falls recently, last one 1 week ago  -asymptomatic no pain  -PT eval  -OP f/u    HO Cerebellar/Basal Ganglia Infarcts 2019  Chronic Dysphagia  -still on DAPT; continue for now with OP f/u  -S&S eval    GERD  -c/w PPI    DVT prophylaxis: enoxaparin  GI prophylaxis: PPI  Diet: DASH  Code status: full code    Activity: AAT/PT  Dispo: from home    84-year-old male past medical history HTN, HLD, GERD, anemia, TBI, splenectomy, orthostatic hypotension, skin CA s/p excision and radiation, Hep C (treated) and CVA coming with complaints of shortness of breath. Admitted for suspected CHF exacerbation    #Suspected New-onset HFrEF  #HTN  #HLD  #Orthostatic Hypotension  - admission EKG - Diagnosis Line Sinus rhythm withmarked sinus arrhythmia with Premature atrial complexes with   Aberrant conduction. Left axis deviation. Right bundle branch block  -telemetry monitoring   -c/w Lasix 40 mg IV bid  -monitor I&O target -ve 2L/day  -monitor lytes  -CXR in AM - CHF, bilateral pleural effusions. Redemonstration cardiomegaly  -ECG - Sinus rhythm with Premature atrial complexes with Aberrant conduction  Right bundle branch block, Left anterior fascicular block  - TTE -  1. Moderately decreased global left ventricular systolic function.   2. LV Ejection Fraction by Palmer's Method with a biplane EF of 38 %.  -CTPE - No CT evidence of acute pulmonary emboli.  -c/w Florinef, compression stockings  -c/w lisinopril 10 mg qd  - start Toprol XL 25mg po q24 for improved rate control     T9 Compression Fx  Recurrent Mechanical Falls  - CTPE - No CT evidence of acute pulmonary emboli.  T9 compression fracture,  new. Correlate with point tenderness and trauma   history. Bilateral small-to-moderate pleural effusions with adjacent   consolidation/atelectasis. Interlobular septal thickening reflecting   lymphangitic congestion. Mild anasarca.  -had multiple falls recently, last one 1 week ago  -asymptomatic no pain  -PT eval  -OP f/u    HO Cerebellar/Basal Ganglia Infarcts   Chronic Dysphagia  -still on DAPT; continue for now with OP f/u  -S&S eval    GERD  -c/w PPI    DVT prophylaxis: enoxaparin  GI prophylaxis: PPI  Diet: DASH  Code status: full code    Activity: AAT/PT  Dispo: from home     pendinpm BMP and Mg

## 2024-12-29 LAB
ALBUMIN SERPL ELPH-MCNC: 3.3 G/DL — LOW (ref 3.5–5.2)
ALP SERPL-CCNC: 107 U/L — SIGNIFICANT CHANGE UP (ref 30–115)
ALT FLD-CCNC: 11 U/L — SIGNIFICANT CHANGE UP (ref 0–41)
ANION GAP SERPL CALC-SCNC: 10 MMOL/L — SIGNIFICANT CHANGE UP (ref 7–14)
AST SERPL-CCNC: 21 U/L — SIGNIFICANT CHANGE UP (ref 0–41)
BASOPHILS # BLD AUTO: 0.05 K/UL — SIGNIFICANT CHANGE UP (ref 0–0.2)
BASOPHILS NFR BLD AUTO: 0.6 % — SIGNIFICANT CHANGE UP (ref 0–1)
BILIRUB SERPL-MCNC: 1.2 MG/DL — SIGNIFICANT CHANGE UP (ref 0.2–1.2)
BUN SERPL-MCNC: 15 MG/DL — SIGNIFICANT CHANGE UP (ref 10–20)
CALCIUM SERPL-MCNC: 7.9 MG/DL — LOW (ref 8.4–10.5)
CHLORIDE SERPL-SCNC: 97 MMOL/L — LOW (ref 98–110)
CO2 SERPL-SCNC: 29 MMOL/L — SIGNIFICANT CHANGE UP (ref 17–32)
CREAT SERPL-MCNC: 0.9 MG/DL — SIGNIFICANT CHANGE UP (ref 0.7–1.5)
EGFR: 84 ML/MIN/1.73M2 — SIGNIFICANT CHANGE UP
EOSINOPHIL # BLD AUTO: 0.23 K/UL — SIGNIFICANT CHANGE UP (ref 0–0.7)
EOSINOPHIL NFR BLD AUTO: 3 % — SIGNIFICANT CHANGE UP (ref 0–8)
GLUCOSE SERPL-MCNC: 85 MG/DL — SIGNIFICANT CHANGE UP (ref 70–99)
HCT VFR BLD CALC: 37.9 % — LOW (ref 42–52)
HGB BLD-MCNC: 12.1 G/DL — LOW (ref 14–18)
IMM GRANULOCYTES NFR BLD AUTO: 0.1 % — SIGNIFICANT CHANGE UP (ref 0.1–0.3)
LYMPHOCYTES # BLD AUTO: 2.1 K/UL — SIGNIFICANT CHANGE UP (ref 1.2–3.4)
LYMPHOCYTES # BLD AUTO: 27.1 % — SIGNIFICANT CHANGE UP (ref 20.5–51.1)
MAGNESIUM SERPL-MCNC: 1.9 MG/DL — SIGNIFICANT CHANGE UP (ref 1.8–2.4)
MCHC RBC-ENTMCNC: 31.9 G/DL — LOW (ref 32–37)
MCHC RBC-ENTMCNC: 32.7 PG — HIGH (ref 27–31)
MCV RBC AUTO: 102.4 FL — HIGH (ref 80–94)
MONOCYTES # BLD AUTO: 0.85 K/UL — HIGH (ref 0.1–0.6)
MONOCYTES NFR BLD AUTO: 11 % — HIGH (ref 1.7–9.3)
NEUTROPHILS # BLD AUTO: 4.52 K/UL — SIGNIFICANT CHANGE UP (ref 1.4–6.5)
NEUTROPHILS NFR BLD AUTO: 58.2 % — SIGNIFICANT CHANGE UP (ref 42.2–75.2)
NRBC # BLD: 0 /100 WBCS — SIGNIFICANT CHANGE UP (ref 0–0)
PHOSPHATE SERPL-MCNC: 2.7 MG/DL — SIGNIFICANT CHANGE UP (ref 2.1–4.9)
PLATELET # BLD AUTO: 159 K/UL — SIGNIFICANT CHANGE UP (ref 130–400)
PMV BLD: 14.4 FL — HIGH (ref 7.4–10.4)
POTASSIUM SERPL-MCNC: 3.7 MMOL/L — SIGNIFICANT CHANGE UP (ref 3.5–5)
POTASSIUM SERPL-SCNC: 3.7 MMOL/L — SIGNIFICANT CHANGE UP (ref 3.5–5)
PROT SERPL-MCNC: 6.6 G/DL — SIGNIFICANT CHANGE UP (ref 6–8)
RBC # BLD: 3.7 M/UL — LOW (ref 4.7–6.1)
RBC # FLD: 16.9 % — HIGH (ref 11.5–14.5)
SODIUM SERPL-SCNC: 136 MMOL/L — SIGNIFICANT CHANGE UP (ref 135–146)
WBC # BLD: 7.76 K/UL — SIGNIFICANT CHANGE UP (ref 4.8–10.8)
WBC # FLD AUTO: 7.76 K/UL — SIGNIFICANT CHANGE UP (ref 4.8–10.8)

## 2024-12-29 RX ORDER — FUROSEMIDE 20 MG
40 TABLET ORAL DAILY
Refills: 0 | Status: DISCONTINUED | OUTPATIENT
Start: 2024-12-29 | End: 2024-12-30

## 2024-12-29 RX ADMIN — Medication 81 MILLIGRAM(S): at 11:21

## 2024-12-29 RX ADMIN — LISINOPRIL 10 MILLIGRAM(S): 30 TABLET ORAL at 05:07

## 2024-12-29 RX ADMIN — CLOPIDOGREL BISULFATE 75 MILLIGRAM(S): 75 TABLET, FILM COATED ORAL at 11:21

## 2024-12-29 RX ADMIN — Medication 25 MILLIGRAM(S): at 05:07

## 2024-12-29 RX ADMIN — Medication 40 MILLIGRAM(S): at 13:52

## 2024-12-29 RX ADMIN — FLUDROCORTISONE ACETATE 0.1 MILLIGRAM(S): 0.1 TABLET ORAL at 05:09

## 2024-12-29 RX ADMIN — TAMSULOSIN HYDROCHLORIDE 0.4 MILLIGRAM(S): 0.4 CAPSULE ORAL at 21:03

## 2024-12-29 RX ADMIN — POTASSIUM CHLORIDE 40 MILLIEQUIVALENT(S): 600 TABLET, FILM COATED, EXTENDED RELEASE ORAL at 02:20

## 2024-12-29 RX ADMIN — ENOXAPARIN SODIUM 40 MILLIGRAM(S): 60 INJECTION INTRAVENOUS; SUBCUTANEOUS at 11:21

## 2024-12-29 RX ADMIN — PANTOPRAZOLE 40 MILLIGRAM(S): 40 TABLET, DELAYED RELEASE ORAL at 05:07

## 2024-12-29 RX ADMIN — Medication 40 MILLIGRAM(S): at 05:06

## 2024-12-29 RX ADMIN — ATORVASTATIN CALCIUM 80 MILLIGRAM(S): 40 TABLET, FILM COATED ORAL at 21:02

## 2024-12-29 NOTE — PROGRESS NOTE ADULT - SUBJECTIVE AND OBJECTIVE BOX
SUBJECTIVE/OVERNIGHT EVENTS  Today is hospital day 2d. This morning patient was seen and examined at bedside, resting comfortably in bed. No acute or major events overnight. On 2L NC, no SOB or CP.     MEDICATIONS  STANDING MEDICATIONS  aspirin enteric coated 81 milliGRAM(s) Oral daily  atorvastatin 80 milliGRAM(s) Oral at bedtime  clopidogrel Tablet 75 milliGRAM(s) Oral daily  enoxaparin Injectable 40 milliGRAM(s) SubCutaneous every 24 hours  fludroCORTISONE 0.1 milliGRAM(s) Oral daily  furosemide   Injectable 40 milliGRAM(s) IV Push two times a day  lisinopril 10 milliGRAM(s) Oral daily  metoprolol succinate ER 25 milliGRAM(s) Oral daily  pantoprazole    Tablet 40 milliGRAM(s) Oral before breakfast  tamsulosin 0.4 milliGRAM(s) Oral at bedtime    PRN MEDICATIONS  acetaminophen     Tablet .. 650 milliGRAM(s) Oral every 6 hours PRN    VITALS  T(F): 97.2 (12-28-24 @ 04:55), Max: 97.8 (12-27-24 @ 13:15)  HR: 84 (12-28-24 @ 04:55) (76 - 100)  BP: 133/83 (12-28-24 @ 04:55) (105/74 - 133/88)  RR: 18 (12-28-24 @ 04:55) (18 - 18)  SpO2: 98% (12-28-24 @ 04:55) (98% - 98%)    PHYSICAL EXAM  GENERAL: NAD, lying in bed comfortably  HEAD:  Atraumatic, normocephalic  EYES: EOMI, PERRLA, conjunctiva and sclera clear  ENT: Moist mucous membranes  NECK: Supple, no JVD  HEART: Regular rate and rhythm, no murmurs, rubs, or gallops  LUNGS: Unlabored respirations.  Clear to auscultation bilaterally, no crackles, wheezing, or rhonchi  ABDOMEN: Soft, nontender, nondistended, +BS  EXTREMITIES: 2+ peripheral pulses bilaterally. No clubbing, cyanosis, or edema  NERVOUS SYSTEM:  A&Ox3, no focal deficits     LABS             11.7   7.28  )-----------( 154      ( 12-28-24 @ 07:22 )             36.9     139  |  101  |  13  -------------------------<  111   12-27-24 @ 16:00  3.5  |  28  |  0.9    Ca      8.2     12-27-24 @ 16:00  Phos   3.1     12-27-24 @ 06:47  Mg     1.6     12-27-24 @ 16:00    TPro  6.3  /  Alb  3.3  /  TBili  1.2  /  DBili  x   /  AST  19  /  ALT  11  /  AlkPhos  99  /  GGT  x     12-27-24 @ 06:47      Troponin T, High Sensitivity Result: 39 ng/L (12-26-24 @ 11:00)  CKMB Units: 3.6 ng/mL (12-26-24 @ 11:00)  Pro-Brain Natriuretic Peptide: 8407 pg/mL (12-26-24 @ 00:55)    Urinalysis Basic - ( 27 Dec 2024 16:00 )    Color: x / Appearance: x / SG: x / pH: x  Gluc: 111 mg/dL / Ketone: x  / Bili: x / Urobili: x   Blood: x / Protein: x / Nitrite: x   Leuk Esterase: x / RBC: x / WBC x   Sq Epi: x / Non Sq Epi: x / Bacteria: x          IMAGING  Echo 12/27/24   1. Moderately decreased global left ventricular systolic function.   2. LV Ejection Fraction by Palmer's Method with a biplane EF of 38 %.   3. The left ventricular diastolic function could not be assessed in this   study.   4. Mildly enlarged right ventricle.   5. Mildly reduced RV systolic function.   6. Severely enlarged left atrium.   7. Severely enlarged right atrium.   8. Mild thickening of the anterior and posterior mitral valve leaflets.   9. Moderate to severe mitral valve regurgitation.  10. Moderate-severe tricuspid regurgitation.  11. Peak/mean AV PG 16/9 mmHg, SVi 20 ml/m^2, FELICIA 1.1 cm^2 consistent   with low-flow, low-gradiaent moderate aortic valve stenosis.  12. Mild aortic regurgitation.  13. Estimated pulmonary artery systolic pressure is 71.9 mmHg assuming a   right atrial pressureof 15 mmHg, which is consistent with severe   pulmonary hypertension.  14. Small pericardial effusion.  
  Name: ÁLVARO SELLERS  Age: 84y  Gender: Male    Pt was seen and examined.   c/o:  doing much better   dyspnea now resolved     Allergies:  No Known Allergies      PHYSICAL EXAM:    Vitals:  T(C): 36.8 (12-29-24 @ 20:18), Max: 36.8 (12-29-24 @ 20:18)  HR: 81 (12-29-24 @ 20:18) (73 - 85)  BP: 118/78 (12-29-24 @ 20:18) (108/76 - 123/86)  RR: 18 (12-29-24 @ 20:18) (18 - 18)  SpO2: 98% (12-29-24 @ 20:18) (97% - 98%)  Wt(kg): --Vital Signs Last 24 Hrs  T(C): 36.8 (29 Dec 2024 20:18), Max: 36.8 (29 Dec 2024 20:18)  T(F): 98.2 (29 Dec 2024 20:18), Max: 98.2 (29 Dec 2024 20:18)  HR: 81 (29 Dec 2024 20:18) (73 - 85)  BP: 118/78 (29 Dec 2024 20:18) (108/76 - 123/86)  BP(mean): 87 (29 Dec 2024 13:42) (87 - 98)  RR: 18 (29 Dec 2024 20:18) (18 - 18)  SpO2: 98% (29 Dec 2024 20:18) (97% - 98%)    Parameters below as of 29 Dec 2024 20:18  Patient On (Oxygen Delivery Method): nasal cannula  O2 Flow (L/min): 1        NECK: Supple, No JVD  CHEST/LUNG: CTA, B/L, only minor fine basilar rales   HEART: S1,S2, N1 Regular rate and rhythm; No murmurs, rubs, or gallops  ABDOMEN: Soft, Nontender, Nondistended; Bowel sounds present  EXTREMITIES:  2+ Peripheral Pulses, No clubbing, cyanosis, or edema      LABS:                        12.1   7.76  )-----------( 159      ( 29 Dec 2024 08:27 )             37.9     12-29    136  |  97[L]  |  15  ----------------------------<  85  3.7   |  29  |  0.9    Ca    7.9[L]      29 Dec 2024 08:27  Phos  2.7     12-29  Mg     1.9     12-29    TPro  6.6  /  Alb  3.3[L]  /  TBili  1.2  /  DBili  x   /  AST  21  /  ALT  11  /  AlkPhos  107  12-29    LIVER FUNCTIONS - ( 29 Dec 2024 08:27 )  Alb: 3.3 g/dL / Pro: 6.6 g/dL / ALK PHOS: 107 U/L / ALT: 11 U/L / AST: 21 U/L / GGT: x                 MEDICATIONS  (STANDING):  aspirin enteric coated 81 milliGRAM(s) Oral daily  atorvastatin 80 milliGRAM(s) Oral at bedtime  clopidogrel Tablet 75 milliGRAM(s) Oral daily  enoxaparin Injectable 40 milliGRAM(s) SubCutaneous every 24 hours  fludroCORTISONE 0.1 milliGRAM(s) Oral daily  furosemide   Injectable 40 milliGRAM(s) IV Push two times a day  lisinopril 10 milliGRAM(s) Oral daily  metoprolol succinate ER 25 milliGRAM(s) Oral daily  pantoprazole    Tablet 40 milliGRAM(s) Oral before breakfast  tamsulosin 0.4 milliGRAM(s) Oral at bedtime        RADIOLOGY & ADDITIONAL TESTS:    Imaging Personally Reviewed:  [ ] YES  [ ] NO    A/P:  84-year-old male past medical history HTN, HLD, GERD, anemia, TBI, splenectomy, orthostatic hypotension, skin CA s/p excision and radiation, Hep C (treated) and CVA coming with complaints of shortness of breath. Admitted for suspected CHF exacerbation    #Acute systolic CHF - 2/2 fluid overload , LVEF estimated at 40%    #HTN - controlled     #Orthostatic Hypotension  - now resolved   -c/w Lasix 40 mg but decrease q 24  -c/w Florinef, compression stockings  -c/w lisinopril 10 mg qd  - start Toprol XL 25mg po q24 for improved rate control     T9 compression fracture,  new.   pain control    HO Cerebellar/Basal Ganglia Infarcts 2019 - stable     GERD  -c/w PPI    DVT prophylaxis: enoxaparin  GI prophylaxis: PPI  Diet: DASH  Code status: full code    Activity: AAT/PT  Dispo: from home     PLAN - likely d/c in Am 
SUBJECTIVE/OVERNIGHT EVENTS  Today is hospital day 1d. This morning patient was seen and examined at bedside, resting comfortably in bed. No acute or major events overnight.    MEDICATIONS  STANDING MEDICATIONS  aspirin enteric coated 81 milliGRAM(s) Oral daily  atorvastatin 80 milliGRAM(s) Oral at bedtime  clopidogrel Tablet 75 milliGRAM(s) Oral daily  enoxaparin Injectable 40 milliGRAM(s) SubCutaneous every 24 hours  fludroCORTISONE 0.1 milliGRAM(s) Oral daily  furosemide   Injectable 40 milliGRAM(s) IV Push two times a day  lisinopril 10 milliGRAM(s) Oral daily  pantoprazole    Tablet 40 milliGRAM(s) Oral before breakfast  tamsulosin 0.4 milliGRAM(s) Oral at bedtime    PRN MEDICATIONS  acetaminophen     Tablet .. 650 milliGRAM(s) Oral every 6 hours PRN    VITALS  T(F): 97.9 (12-27-24 @ 04:45), Max: 97.9 (12-27-24 @ 04:45)  HR: 85 (12-27-24 @ 04:45) (73 - 106)  BP: 134/82 (12-27-24 @ 04:45) (120/83 - 139/98)  RR: 18 (12-27-24 @ 04:45) (18 - 18)  SpO2: 96% (12-27-24 @ 07:45) (96% - 100%)    PHYSICAL EXAM  GENERAL: NAD, lying in bed comfortably  HEAD:  Atraumatic, normocephalic  EYES: EOMI, PERRLA, conjunctiva and sclera clear  ENT: Moist mucous membranes  NECK: Supple, no JVD  HEART: Regular rate and rhythm, no murmurs, rubs, or gallops  LUNGS: Unlabored respirations.  Clear to auscultation bilaterally, no crackles, wheezing, or rhonchi  ABDOMEN: Soft, nontender, nondistended, +BS  EXTREMITIES: 2+ peripheral pulses bilaterally. No clubbing, cyanosis, or edema  NERVOUS SYSTEM:  A&Ox3, no focal deficits     LABS             11.8   7.48  )-----------( 160      ( 12-27-24 @ 06:47 )             36.5     145  |  106  |  17  -------------------------<  92   12-26-24 @ 11:00  3.6  |  27  |  0.9    Ca      8.2     12-26-24 @ 11:00    TPro  6.4  /  Alb  3.4  /  TBili  1.0  /  DBili  x   /  AST  22  /  ALT  13  /  AlkPhos  100  /  GGT  x     12-26-24 @ 11:00      Troponin T, High Sensitivity Result: 39 ng/L (12-26-24 @ 11:00)  CKMB Units: 3.6 ng/mL (12-26-24 @ 11:00)  Pro-Brain Natriuretic Peptide: 8407 pg/mL (12-26-24 @ 00:55)    Urinalysis Basic - ( 26 Dec 2024 11:00 )    Color: x / Appearance: x / SG: x / pH: x  Gluc: 92 mg/dL / Ketone: x  / Bili: x / Urobili: x   Blood: x / Protein: x / Nitrite: x   Leuk Esterase: x / RBC: x / WBC x   Sq Epi: x / Non Sq Epi: x / Bacteria: x          IMAGING

## 2024-12-30 ENCOUNTER — TRANSCRIPTION ENCOUNTER (OUTPATIENT)
Age: 84
End: 2024-12-30

## 2024-12-30 VITALS
DIASTOLIC BLOOD PRESSURE: 77 MMHG | HEART RATE: 84 BPM | SYSTOLIC BLOOD PRESSURE: 108 MMHG | TEMPERATURE: 98 F | OXYGEN SATURATION: 100 % | RESPIRATION RATE: 18 BRPM

## 2024-12-30 LAB
ALBUMIN SERPL ELPH-MCNC: 3.2 G/DL — LOW (ref 3.5–5.2)
ALP SERPL-CCNC: 104 U/L — SIGNIFICANT CHANGE UP (ref 30–115)
ALT FLD-CCNC: 11 U/L — SIGNIFICANT CHANGE UP (ref 0–41)
ANION GAP SERPL CALC-SCNC: 13 MMOL/L — SIGNIFICANT CHANGE UP (ref 7–14)
AST SERPL-CCNC: 19 U/L — SIGNIFICANT CHANGE UP (ref 0–41)
BASOPHILS # BLD AUTO: 0.04 K/UL — SIGNIFICANT CHANGE UP (ref 0–0.2)
BASOPHILS NFR BLD AUTO: 0.6 % — SIGNIFICANT CHANGE UP (ref 0–1)
BILIRUB SERPL-MCNC: 1.3 MG/DL — HIGH (ref 0.2–1.2)
BUN SERPL-MCNC: 14 MG/DL — SIGNIFICANT CHANGE UP (ref 10–20)
CALCIUM SERPL-MCNC: 8.1 MG/DL — LOW (ref 8.4–10.5)
CHLORIDE SERPL-SCNC: 99 MMOL/L — SIGNIFICANT CHANGE UP (ref 98–110)
CO2 SERPL-SCNC: 27 MMOL/L — SIGNIFICANT CHANGE UP (ref 17–32)
CREAT SERPL-MCNC: 0.7 MG/DL — SIGNIFICANT CHANGE UP (ref 0.7–1.5)
EGFR: 90 ML/MIN/1.73M2 — SIGNIFICANT CHANGE UP
EOSINOPHIL # BLD AUTO: 0.19 K/UL — SIGNIFICANT CHANGE UP (ref 0–0.7)
EOSINOPHIL NFR BLD AUTO: 2.6 % — SIGNIFICANT CHANGE UP (ref 0–8)
GLUCOSE SERPL-MCNC: 90 MG/DL — SIGNIFICANT CHANGE UP (ref 70–99)
HCT VFR BLD CALC: 38.2 % — LOW (ref 42–52)
HGB BLD-MCNC: 12.3 G/DL — LOW (ref 14–18)
IMM GRANULOCYTES NFR BLD AUTO: 0.3 % — SIGNIFICANT CHANGE UP (ref 0.1–0.3)
LYMPHOCYTES # BLD AUTO: 1.84 K/UL — SIGNIFICANT CHANGE UP (ref 1.2–3.4)
LYMPHOCYTES # BLD AUTO: 25.7 % — SIGNIFICANT CHANGE UP (ref 20.5–51.1)
MAGNESIUM SERPL-MCNC: 1.7 MG/DL — LOW (ref 1.8–2.4)
MCHC RBC-ENTMCNC: 32.2 G/DL — SIGNIFICANT CHANGE UP (ref 32–37)
MCHC RBC-ENTMCNC: 32.9 PG — HIGH (ref 27–31)
MCV RBC AUTO: 102.1 FL — HIGH (ref 80–94)
MONOCYTES # BLD AUTO: 0.72 K/UL — HIGH (ref 0.1–0.6)
MONOCYTES NFR BLD AUTO: 10 % — HIGH (ref 1.7–9.3)
NEUTROPHILS # BLD AUTO: 4.36 K/UL — SIGNIFICANT CHANGE UP (ref 1.4–6.5)
NEUTROPHILS NFR BLD AUTO: 60.8 % — SIGNIFICANT CHANGE UP (ref 42.2–75.2)
NRBC # BLD: 0 /100 WBCS — SIGNIFICANT CHANGE UP (ref 0–0)
PHOSPHATE SERPL-MCNC: 2.6 MG/DL — SIGNIFICANT CHANGE UP (ref 2.1–4.9)
PLATELET # BLD AUTO: 152 K/UL — SIGNIFICANT CHANGE UP (ref 130–400)
PMV BLD: 13.9 FL — HIGH (ref 7.4–10.4)
POTASSIUM SERPL-MCNC: 3.3 MMOL/L — LOW (ref 3.5–5)
POTASSIUM SERPL-SCNC: 3.3 MMOL/L — LOW (ref 3.5–5)
PROT SERPL-MCNC: 6.3 G/DL — SIGNIFICANT CHANGE UP (ref 6–8)
RBC # BLD: 3.74 M/UL — LOW (ref 4.7–6.1)
RBC # FLD: 17 % — HIGH (ref 11.5–14.5)
SODIUM SERPL-SCNC: 139 MMOL/L — SIGNIFICANT CHANGE UP (ref 135–146)
WBC # BLD: 7.17 K/UL — SIGNIFICANT CHANGE UP (ref 4.8–10.8)
WBC # FLD AUTO: 7.17 K/UL — SIGNIFICANT CHANGE UP (ref 4.8–10.8)

## 2024-12-30 RX ORDER — MAGNESIUM SULFATE 500 MG/ML
2 INJECTION, SOLUTION INTRAMUSCULAR; INTRAVENOUS
Refills: 0 | Status: DISCONTINUED | OUTPATIENT
Start: 2024-12-30 | End: 2024-12-30

## 2024-12-30 RX ORDER — TRIAMTERENE/HYDROCHLOROTHIAZID 37.5-25 MG
1 TABLET ORAL
Qty: 0 | Refills: 0 | DISCHARGE

## 2024-12-30 RX ORDER — POTASSIUM CHLORIDE 600 MG/1
40 TABLET, FILM COATED, EXTENDED RELEASE ORAL EVERY 4 HOURS
Refills: 0 | Status: DISCONTINUED | OUTPATIENT
Start: 2024-12-30 | End: 2024-12-30

## 2024-12-30 RX ADMIN — FLUDROCORTISONE ACETATE 0.1 MILLIGRAM(S): 0.1 TABLET ORAL at 05:37

## 2024-12-30 RX ADMIN — CLOPIDOGREL BISULFATE 75 MILLIGRAM(S): 75 TABLET, FILM COATED ORAL at 11:33

## 2024-12-30 RX ADMIN — LISINOPRIL 10 MILLIGRAM(S): 30 TABLET ORAL at 05:36

## 2024-12-30 RX ADMIN — POTASSIUM CHLORIDE 40 MILLIEQUIVALENT(S): 600 TABLET, FILM COATED, EXTENDED RELEASE ORAL at 11:33

## 2024-12-30 RX ADMIN — Medication 25 MILLIGRAM(S): at 05:36

## 2024-12-30 RX ADMIN — PANTOPRAZOLE 40 MILLIGRAM(S): 40 TABLET, DELAYED RELEASE ORAL at 05:36

## 2024-12-30 RX ADMIN — Medication 81 MILLIGRAM(S): at 11:33

## 2024-12-30 RX ADMIN — Medication 800 MILLIGRAM(S): at 12:49

## 2024-12-30 RX ADMIN — Medication 40 MILLIGRAM(S): at 05:36

## 2024-12-30 RX ADMIN — ENOXAPARIN SODIUM 40 MILLIGRAM(S): 60 INJECTION INTRAVENOUS; SUBCUTANEOUS at 11:34

## 2024-12-30 NOTE — DISCHARGE NOTE PROVIDER - HOSPITAL COURSE
Patient: Suzanna Qureshi  Age: 60 year old  Sex: female  MRN: [id number]  Encounter Date: 7/9/2018     [facility] EMERGENCY DEPARTMENT ENCOUNTER   [facility] EMERGENCY SERVICES  [address]  [place] [address]  [phone]      History     Chief Complaint   Patient presents with   • Fever 9 Weeks to 74 years       HPI  This is a 60 year old female, history of MS, hypertension, COPD, hyperlipidemia, depression/anxiety, fibromyalgia. [facility]. Patient states that she went out to smoke a cigarette today with her friend and began feeling poorly. Currently, she states that she feels warm but has no specific complaints. She does not ambulate due to her MS and states that she urinates in her briefs. Patient denies new neurologic symptoms, stiff neck, chest pain, shortness of breath, sore throat/cough, nausea/vomiting, abdominal pain, GI/ changes, blood in urine or stool, black stool, and all other complaints at this time.    Allergies  ALLERGIES:   Allergen Reactions   • Toradol RASH     Allergic vasculitis.    • Codeine HIVES   • Doxycycline RASH, PRURITUS and Other (See Comments)     Red rash that itched and burned       Current Medications  Prior to Admission Medications    ACETAMINOPHEN 650 MG TAB    Take 650 mg by mouth every 4 hours as needed (fever or pain).    ALBUTEROL (PROAIR HFA) 108 (90 BASE) MCG/ACT INHALER    Inhale 2 puffs into the lungs 4 times daily as needed for Shortness of Breath or Wheezing. Take for wheezing.    ALBUTEROL-IPRATROPIUM 2.5 MG/0.5 MG (DUONEB) 0.5-2.5 (3) MG/3ML NEBULIZER SOLUTION    Take 3 mLs by nebulization every 6 hours as needed for Wheezing or Shortness of Breath.    ALBUTEROL-IPRATROPIUM 2.5 MG/0.5 MG (DUONEB) 0.5-2.5 (3) MG/3ML NEBULIZER SOLUTION    Take 3 mLs by nebulization 4 times daily.    ASPIRIN (HM ASPIRIN) 81 MG CHEWABLE TABLET    Chew 1 tablet by mouth daily.     CALCIUM CARBONATE (TUMS) 500 MG CHEWABLE TABLET    Chew 1-3 tablets by mouth 3 times daily as needed for Heartburn.    CHOLECALCIFEROL (VITAMIN D3) 1000 UNITS TABLET    Take 5 tablets by mouth daily.    CLONAZEPAM (KLONOPIN) 1 MG TABLET    Take 1 tablet by mouth 2 times daily.    CYCLOBENZAPRINE (FLEXERIL) 10 MG TABLET    Take 1 tablet by mouth 3 times daily as needed for Muscle spasms.    DAILY MULTIPLE VITAMINS TAB    Take 1 tablet by mouth daily.    DULOXETINE (CYMBALTA) 30 MG CAPSULE    Take 30 mg by mouth nightly. Give with 60 mg for total nightly dose = 90 mg    DULOXETINE (CYMBALTA) 60 MG CAPSULE    Take 60 mg by mouth 2 times daily. Take 60mg capsule with a 30mg capusle at bedtime, dose will be 30+60=90mg    FLUTICASONE (FLONASE) 50 MCG/ACT NASAL SPRAY    Spray 1 spray in each nostril 2 times daily as needed (allergies).    FLUTICASONE (FLOVENT DISKUS) 250 MCG/BLIST INHALER    Inhale 1 puff into the lungs 2 times daily.    GLATIRAMER ACETATE 40 MG/ML SOLUTION PREFILLED SYRINGE    Inject 1 mL into the skin three times a week.    LACTOBACILLUS ACIDOPHILUS (BACID)    Take 1 tablet by mouth 2 times daily.    MAGNESIUM HYDROXIDE (MILK OF MAGNESIA PO)    Take 30 mLs by mouth daily as needed.    MELATONIN 1 MG TAB    Take 1 mg by mouth nightly.    MORPHINE SR (MS CONTIN) 15 MG 12 HR TABLET    Take 1 tablet by mouth 2 times daily.    OXYBUTYNIN (DITROPAN XL) 15 MG 24 HR TABLET    Take 15 mg by mouth daily.    POLYETHYLENE GLYCOL (GLYCOLAX, MIRALAX) PACKET    Take 17 g by mouth daily.    PREGABALIN (LYRICA) 200 MG CAPSULE    Take 1 capsule by mouth 2 times daily.    SENNA (SENOKOT) 8.6 MG TABLET    Take 1 tablet by mouth daily.    SIMVASTATIN (ZOCOR) 10 MG TABLET    Take 1 tablet by mouth nightly.       Past Medical History  Past Medical History:   Diagnosis Date   • Acute cystitis    • Acute UTI    • Anxiety    • Blood in the stool 1/15/2016   • Cervical cancer (CMS/HCC)    • Chronic bronchitis (CMS/HCC)    •  Chronic pain    • Constipation    • COPD (chronic obstructive pulmonary disease) (CMS/HCC)    • Depression    • Dyspepsia    • Esophageal reflux    • Fibromyalgia    • Gastritis    • HLD (hyperlipidemia)    • Hot flashes, menopausal 7/10/2015   • Hypertension    • Hypoxia    • Increased urinary frequency    • Infectious colitis 2016   • Multiple sclerosis (CMS/HCC)    • Osteoarthritis    • Other and unspecified hyperlipidemia    • Severe sepsis (CMS/HCC)    • Smoker    • Toe ulcer (CMS/HCC)    • Urinary retention 7/10/2015   • Urinary tract infection        Surgical History  Past Surgical History:   Procedure Laterality Date   • Hysterectomy      YFN   • Tonsillectomy and adenoidectomy         Social History  Social History     Social History   • Marital status:      Spouse name: N/A   • Number of children: N/A   • Years of education: N/A     Social History Main Topics   • Smoking status: Current Every Day Smoker     Packs/day: 0.25     Years: 40.00     Types: Cigarettes   • Smokeless tobacco: Never Used   • Alcohol use No   • Drug use: No   • Sexual activity: Not Asked     Other Topics Concern   • None     Social History Narrative   • None        Family History  Family History   Problem Relation Age of Onset   • Cancer Mother          - 90, old age   • Hypertension Mother    • Heart Father          - 42, enlarged heart       Review of Systems  All other systems reviewed and otherwise negative or noncontributory except as documented in the HPI (History of Present Illness).    Physical Exam     ED Triage Vitals [18 1714]   ED Triage Vitals Group      Temp (!) 103 °F (39.4 °C)      Pulse 97      Resp 24      /68      SpO2 (!) 88 %      EtCO2 mmHg       Height 5' 5\" (1.651 m)      Weight 197 lb 1.6 oz (89.4 kg)      Weight Scale Used ED Actual     Physical Exam   Constitutional: She is oriented to person, place, and time. She appears well-developed and well-nourished. No  distress.   Feels warm to the touch   HENT:   Head: Normocephalic and atraumatic.   Right Ear: External ear normal.   Left Ear: External ear normal.   Nose: Nose normal.   Mouth/Throat: Oropharynx is clear and moist. No oropharyngeal exudate.   Eyes: EOM are normal. Pupils are equal, round, and reactive to light.   Neck: Normal range of motion. Neck supple.   Cardiovascular: Normal rate, regular rhythm and intact distal pulses.    Pulmonary/Chest: Effort normal and breath sounds normal. No respiratory distress.   Abdominal: Soft. Bowel sounds are normal. There is tenderness in the suprapubic area. There is no rigidity, no rebound, no guarding and no CVA tenderness.   Musculoskeletal: She exhibits no tenderness or deformity.   Lymphadenopathy:     She has no cervical adenopathy.   Neurological: She is alert and oriented to person, place, and time.   Skin: Skin is warm and dry. She is not diaphoretic.   Psychiatric: She has a normal mood and affect.   Nursing note and vitals reviewed.       ED Course      Procedures  none    ED Medication Orders     Start Ordered     Status Ordering Provider    07/09/18 1922 07/09/18 1921  ceFEPIme (MAXIPIME) 1,000 mg in sodium chloride 0.9 % 100 mL IVPB  ONCE      Acknowledged DENNIS SPEARS    07/09/18 1922 07/09/18 1921  vancomycin 1,750 mg in sodium chloride 0.9% 250 mL IVPB  ONCE      Acknowledged DENNIS SPEARS    07/09/18 1921 07/09/18 1921  sodium chloride (NORMAL SALINE) 0.9 % bolus 1,000 mL  ONCE      Acknowledged DENNIS SPEARS    07/09/18 1734 07/09/18 1733  acetaminophen (TYLENOL) tablet 1,000 mg  ONCE      Last MAR action:  Given DENNIS SPEARS    07/09/18 1734 07/09/18 1733  sodium chloride (NORMAL SALINE) 0.9 % bolus 1,000 mL  ONCE      Last MAR action:  New Bag DENNIS SPEARS           Lab Results     Results for orders placed or performed during the hospital encounter of 07/09/18   CBC & Auto Differential   Result Value    WBC 13.8 (H)     RBC 5.30 (H)    HGB 14.2    HCT 44.2    MCV 83.4    MCH 26.8    MCHC 32.1    RDW-CV 16.6 (H)        DIFF TYPE AUTO DIFF verified by manual smear review.    Neutrophil 80    LYMPH 9    MONO 11    EOSIN 0    BASO 0    Absolute Neutrophil 11.1 (H)    Absolute Lymph 1.2    Absolute Mono 1.5 (H)    Absolute Eos 0.0 (L)    Absolute Baso 0.0   Comprehensive Metabolic Panel   Result Value    Sodium 134 (L)    Potassium 3.7    Chloride 98    Carbon Dioxide 29    Anion Gap 11    Glucose 101 (H)    BUN 9    Creatinine 0.54    GFR Estimate,  >90     Comment: eGFR results = or >90 mL/min/1.73m2 = Normal kidney function.    GFR Estimate, Non  >90     Comment: eGFR results = or >90 mL/min/1.73m2 = Normal kidney function.    BUN/Creatinine Ratio 17    CALCIUM 9.4    TOTAL BILIRUBIN 0.6    AST/SGOT 18    ALT/SGPT 17    ALK PHOSPHATASE 89    TOTAL PROTEIN 8.1    Albumin 2.9 (L)    GLOBULIN 5.2 (H)    A/G Ratio, Serum 0.6 (L)   Urinalysis & Reflex Micro with Culture if Indicated   Result Value    COLOR YELLOW    APPEARANCE HAZY    GLUCOSE(URINE) NEGATIVE    BILIRUBIN NEGATIVE    KETONES NEGATIVE    SPECIFIC GRAVITY 1.015    BLOOD MODERATE (A)    pH 6.5    PROTEIN(URINE) 30 (A)    UROBILINOGEN 0.2    NITRITE POSITIVE (A)     Comment: Culture indicated, results to follow.    LEUKOCYTE ESTERASE LARGE (A)     Comment: Culture indicated, results to follow.    SPECIMEN TYPE URINE, CATHETERIZED, STRAIGHT     Comment: CORRECTED ON 07/09 AT 1904: PREVIOUSLY REPORTED AS URINE, CATHETERIZED, BUSTOS   Lactic Acid Venous   Result Value    Lactic Acid Venous 1.2   Procalcitonin   Result Value    PROCALCITONIN 0.12 (H)     Comment: </= 0.5 ng/mL: Sepsis not likely. Localized bacterial infection possible.  0.6 - 2.0 ng/mL: Sepsis or other conditions possible.      > 2.0 ng/mL: Sepsis likely      > 9.9 ng/mL: Severe sepsis or bacterial shock likely.     Urinalysis Microscopic   Result Value    Squamous EPI'S 6  to 10    RBC 6 to 10    WBC >100 (H)     Comment: Culture indicated, results to follow.    BACTERIA LARGE (A)    Hyaline Casts NONE SEEN            Radiology Results     XR CHEST AP OR PA - PORTABLE   Final Result   IMPRESSION:       1.  No focal airspace disease.  Lung markings at the left base appears   slightly prominent which is nonspecific and could potentially be secondary   to atelectasis.  Subtle/early inflammatory infiltrate not entirely   excluded.  Specificity of this finding somewhat reduced due to   underpenetration in this area.               Medical Decision Making   This is a 60 year old female, history of MS, hypertension, COPD, hyperlipidemia, depression/anxiety, fibromyalgia. who presented to the ED from [facility] for fever.   Patient initially given Tylenol for her fever.  Clinically, she states that she has no complaints other than \"feeling bad\". Physical exam is significant for suprapubic pain.  She has an elevated white blood cell count and an elevated pro-calcitonin with likely urinary tract infection. She is also febrile and has an elevated heart rate. She does meet sepsis criteria at this time. Patient was given 2 boluses of normal saline in the emergency department along with cefepime and vancomycin.  Chest x-ray is unremarkable.  Blood cultures pending.  Additionally, the patient was mildly hypoxic in the emergency department and had an SPO2 of 88. On 2 L of oxygen she is saturating at 94%.  The patient updated on laboratory results and is comfortable with plan for admission today.  Hospitalist was contacted, case discussed. The decision to admit this patient was made at 1934.  Patient transported to floor stable condition.    Clinical Impression     ED Diagnoses        Final diagnoses    Sepsis, due to unspecified organism (CMS/[facility])     Acute UTI     Hypoxia                  Anup Munguia PA-C  07/09/18 1938     HISTORY OF PRESENT ILLNESS & PAST MEDICAL HISTORY  84-year-old male past medical history HTN, HLD, GERD, anemia, TBI, splenectomy, skin CA s/p excision and radiation, Hep C (treated), ex-smoker quit 50 years ago, and CVA coming with complaints of shortness of breath.  Past 2 weeks intermittent episodes of shortness of breath, worsened with exertion.  Intermittently feels like something is stuck in his throat causing him decreased ability to breathe.  Denies leg swelling.  No cough. Denies any fever, chills, nausea, vomiting, CP, changes in urination, or changes in bowel movements.    IMAGING  ECG sinus w/ RBBB and PAC unchanged to prior   CTPE:  no PE  interlobular septal thickening  bilateral small-moderate effusion  mild emphysematous changes     Patient was diagnosed with new-onset heart failure with reduced ejection fraction (HFrEF) supported by a transthoracic echocardiogram showing an ejection fraction of 38%. He was treated with intravenous Lasix, lisinopril, and metoprolol succinate (Toprol XL) for rate control. He also experienced orthostatic hypotension, managed with Florinef and compression stockings. A new T9 compression fracture was discovered and treated for pain control. His history of cerebellar/basal ganglia infarcts from 2019 was stable, and his GERD was managed with a PPI. The patient's orthostatic hypotension resolved, and his shortness of breath improved with diuresis and initiation of heart failure medications.    #Suspected New-onset HFrEF  #HTN  #HLD  #Orthostatic Hypotension  - admission EKG - Diagnosis Line Sinus rhythm withmarked sinus arrhythmia with Premature atrial complexes with   Aberrant conduction. Left axis deviation. Right bundle branch block  -telemetry monitoring   -c/w Lasix 40 mg IV bid  -monitor I&O target -ve 2L/day  -monitor lytes  -CXR in AM - CHF, bilateral pleural effusions. Redemonstration cardiomegaly  -ECG - Sinus rhythm with Premature atrial complexes with Aberrant conduction  Right bundle branch block, Left anterior fascicular block  - TTE -  1. Moderately decreased global left ventricular systolic function.   2. LV Ejection Fraction by Palmer's Method with a biplane EF of 38 %.  -CTPE - No CT evidence of acute pulmonary emboli.  -c/w Florinef, compression stockings  -c/w lisinopril 10 mg qd  - started Toprol XL 25mg po q24 for improved rate control - on dc will go home with triam-HCTZ and follow up with Dr. Enriquez on Saturday    #HTN - controlled     #Orthostatic Hypotension  - now resolved   -c/w Lasix 40 mg but decrease q 24  -c/w Florinef, compression stockings  -c/w lisinopril 10 mg qd    T9 compression fracture,  new.   pain control    HO Cerebellar/Basal Ganglia Infarcts 2019 - stable     GERD  -c/w PPI    Discussion of discharge plan of care, including discharge diagnosis, medication reconciliation, and follow-ups, was conducted with Dr. Enriquez on 12/30/2024 and discharge was approved

## 2024-12-30 NOTE — DISCHARGE NOTE NURSING/CASE MANAGEMENT/SOCIAL WORK - PATIENT PORTAL LINK FT
You can access the FollowMyHealth Patient Portal offered by [facility] by registering at the following website: [email or web address]. By joining [employer]’s FollowMyHealth portal, you will also be able to view your health information using other applications (apps) compatible with our system.

## 2024-12-30 NOTE — DISCHARGE NOTE PROVIDER - NSDCCPCAREPLAN_GEN_ALL_CORE_FT
PRINCIPAL DISCHARGE DIAGNOSIS  Diagnosis: Acute CHF  Assessment and Plan of Treatment: You came to the hospital because you've been feeling short of breath for the past two weeks, especially with activity. This shortness of breath is likely due to your heart not pumping as well as it should (heart failure). An ultrasound of your heart showed that it's pumping less effectively than normal. You also have some fluid around your lungs, which can make it harder to breathe. We gave you medications to help remove excess fluid and improve your heart's pumping strength. You'll go home on medications to help your heart pump better, and you should also continue taking your home medication as you have before coming to the hospital. If you experience any worsening shortness of breath, chest pain, or other concerning symptoms, please return to the hospital.  Please meet with Dr. Enriquez on Saturaday at 1PM for a follow up appointment

## 2024-12-30 NOTE — DISCHARGE NOTE PROVIDER - NSDCMRMEDTOKEN_GEN_ALL_CORE_FT
Aspirin Low Dose 81 mg oral delayed release tablet: 1 tab(s) orally once a day  clopidogrel 75 mg oral tablet: 1 tab(s) orally once a day  Flomax 0.4 mg oral capsule: 1 cap(s) orally once a day (at bedtime)  Florinef Acetate 0.1 mg oral tablet: 1 tab(s) orally once a day  Lexapro 20 mg oral tablet: 1 tab(s) orally once a day  Lipitor 80 mg oral tablet: 1 tab(s) orally once a day (at bedtime)  lisinopril 10 mg oral tablet: 1 tab(s) orally once a day (at bedtime)   mirtazapine 15 mg oral tablet: 1 tab(s) orally once a day (at bedtime)  pantoprazole 40 mg oral delayed release tablet: 1 tab(s) orally once a day   Aspirin Low Dose 81 mg oral delayed release tablet: 1 tab(s) orally once a day  clopidogrel 75 mg oral tablet: 1 tab(s) orally once a day  Flomax 0.4 mg oral capsule: 1 cap(s) orally once a day (at bedtime)  Florinef Acetate 0.1 mg oral tablet: 1 tab(s) orally once a day  Lexapro 20 mg oral tablet: 1 tab(s) orally once a day  Lipitor 80 mg oral tablet: 1 tab(s) orally once a day (at bedtime)  lisinopril 10 mg oral tablet: 1 tab(s) orally once a day (at bedtime)   mirtazapine 15 mg oral tablet: 1 tab(s) orally once a day (at bedtime)  pantoprazole 40 mg oral delayed release tablet: 1 tab(s) orally once a day  triamterene-hydrochlorothiazide 37.5 mg-25 mg oral tablet: 1 tab(s) orally once a day Follow up with Dr. Enriquez on Saturday 1/4 at 1PM

## 2024-12-30 NOTE — DISCHARGE NOTE NURSING/CASE MANAGEMENT/SOCIAL WORK - NSDCVIVACCINE_GEN_ALL_CORE_FT
Tdap; 09-Nov-2024 20:08; Cintia Conroy (RN); [employer]; [id number] (Exp. Date: 30-May-2026); IntraMuscular; Deltoid Left.; 0.5 milliLiter(s); VIS (VIS Published: 09-May-2013, VIS Presented: 09-Nov-2024);

## 2024-12-30 NOTE — DISCHARGE NOTE PROVIDER - CARE PROVIDER_API CALL
Deion Enriquez  Internal Medicine  [address] [place] [address]  Phone: [phone]  Fax: [phone]  Follow Up Time: 1 week

## 2024-12-30 NOTE — DISCHARGE NOTE PROVIDER - NSDCHHPTASSISTHOME_GEN_ALL_CORE
Please return for any new or worsening symptoms, or any concerns.  
Patient Needs Assistance to Leave Residence...

## 2024-12-30 NOTE — DISCHARGE NOTE NURSING/CASE MANAGEMENT/SOCIAL WORK - FINANCIAL ASSISTANCE
[facility] provides services at a reduced cost to those who are determined to be eligible through [facility]’s financial assistance program. Information regarding [facility]’s financial assistance program can be found by going to [email or web address] or by calling [phone].

## 2024-12-30 NOTE — DISCHARGE NOTE PROVIDER - NSDCFUSCHEDAPPT_GEN_ALL_CORE_FT
Edis De La Fuente  [facility]  [address] [facility]  Scheduled Appointment: 03/19/2025

## 2024-12-31 ENCOUNTER — EMERGENCY (EMERGENCY)
Facility: HOSPITAL | Age: 84
LOS: 0 days | Discharge: HOME | End: 2024-12-31
Attending: EMERGENCY MEDICINE
Payer: MEDICARE

## 2024-12-31 VITALS
DIASTOLIC BLOOD PRESSURE: 80 MMHG | HEART RATE: 88 BPM | SYSTOLIC BLOOD PRESSURE: 120 MMHG | OXYGEN SATURATION: 98 % | RESPIRATION RATE: 20 BRPM

## 2024-12-31 VITALS
HEART RATE: 95 BPM | DIASTOLIC BLOOD PRESSURE: 83 MMHG | SYSTOLIC BLOOD PRESSURE: 127 MMHG | RESPIRATION RATE: 22 BRPM | OXYGEN SATURATION: 80 %

## 2024-12-31 DIAGNOSIS — Z85.828 PERSONAL HISTORY OF OTHER MALIGNANT NEOPLASM OF SKIN: Chronic | ICD-10-CM

## 2024-12-31 DIAGNOSIS — R06.02 SHORTNESS OF BREATH: ICD-10-CM

## 2024-12-31 DIAGNOSIS — Z87.828 PERSONAL HISTORY OF OTHER (HEALED) PHYSICAL INJURY AND TRAUMA: Chronic | ICD-10-CM

## 2024-12-31 DIAGNOSIS — I45.10 UNSPECIFIED RIGHT BUNDLE-BRANCH BLOCK: ICD-10-CM

## 2024-12-31 DIAGNOSIS — I50.9 HEART FAILURE, UNSPECIFIED: ICD-10-CM

## 2024-12-31 DIAGNOSIS — J10.1 INFLUENZA DUE TO OTHER IDENTIFIED INFLUENZA VIRUS WITH OTHER RESPIRATORY MANIFESTATIONS: ICD-10-CM

## 2024-12-31 DIAGNOSIS — I49.3 VENTRICULAR PREMATURE DEPOLARIZATION: ICD-10-CM

## 2024-12-31 DIAGNOSIS — I49.1 ATRIAL PREMATURE DEPOLARIZATION: ICD-10-CM

## 2024-12-31 LAB
ALBUMIN SERPL ELPH-MCNC: 3.2 G/DL — LOW (ref 3.5–5.2)
ALP SERPL-CCNC: 107 U/L — SIGNIFICANT CHANGE UP (ref 30–115)
ALT FLD-CCNC: 14 U/L — SIGNIFICANT CHANGE UP (ref 0–41)
ANION GAP SERPL CALC-SCNC: 10 MMOL/L — SIGNIFICANT CHANGE UP (ref 7–14)
AST SERPL-CCNC: 28 U/L — SIGNIFICANT CHANGE UP (ref 0–41)
BASE EXCESS BLDV CALC-SCNC: 6.8 MMOL/L — HIGH (ref -2–3)
BASOPHILS # BLD AUTO: 0.05 K/UL — SIGNIFICANT CHANGE UP (ref 0–0.2)
BASOPHILS NFR BLD AUTO: 0.6 % — SIGNIFICANT CHANGE UP (ref 0–1)
BILIRUB SERPL-MCNC: 1.3 MG/DL — HIGH (ref 0.2–1.2)
BUN SERPL-MCNC: 15 MG/DL — SIGNIFICANT CHANGE UP (ref 10–20)
CA-I SERPL-SCNC: 1.14 MMOL/L — LOW (ref 1.15–1.33)
CALCIUM SERPL-MCNC: 8.2 MG/DL — LOW (ref 8.4–10.5)
CHLORIDE SERPL-SCNC: 100 MMOL/L — SIGNIFICANT CHANGE UP (ref 98–110)
CO2 SERPL-SCNC: 29 MMOL/L — SIGNIFICANT CHANGE UP (ref 17–32)
COHGB MFR BLDV: 2.1 % — SIGNIFICANT CHANGE UP
CREAT SERPL-MCNC: 0.9 MG/DL — SIGNIFICANT CHANGE UP (ref 0.7–1.5)
EGFR: 84 ML/MIN/1.73M2 — SIGNIFICANT CHANGE UP
EOSINOPHIL # BLD AUTO: 0.08 K/UL — SIGNIFICANT CHANGE UP (ref 0–0.7)
EOSINOPHIL NFR BLD AUTO: 1 % — SIGNIFICANT CHANGE UP (ref 0–8)
FLUAV AG NPH QL: DETECTED
FLUBV AG NPH QL: SIGNIFICANT CHANGE UP
GAS PNL BLDV: 135 MMOL/L — LOW (ref 136–145)
GAS PNL BLDV: SIGNIFICANT CHANGE UP
GLUCOSE SERPL-MCNC: 132 MG/DL — HIGH (ref 70–99)
HCO3 BLDV-SCNC: 33 MMOL/L — HIGH (ref 22–29)
HCT VFR BLD CALC: 38.4 % — LOW (ref 42–52)
HCT VFR BLDA CALC: 39 % — SIGNIFICANT CHANGE UP (ref 39–51)
HGB BLD CALC-MCNC: 12.6 G/DL — SIGNIFICANT CHANGE UP (ref 12.6–17.4)
HGB BLD CALC-MCNC: 12.9 G/DL — SIGNIFICANT CHANGE UP (ref 12.6–17.4)
HGB BLD-MCNC: 12.5 G/DL — LOW (ref 14–18)
IMM GRANULOCYTES NFR BLD AUTO: 0.4 % — HIGH (ref 0.1–0.3)
LACTATE BLDV-MCNC: 2.6 MMOL/L — HIGH (ref 0.5–2)
LYMPHOCYTES # BLD AUTO: 2.99 K/UL — SIGNIFICANT CHANGE UP (ref 1.2–3.4)
LYMPHOCYTES # BLD AUTO: 36.1 % — SIGNIFICANT CHANGE UP (ref 20.5–51.1)
MCHC RBC-ENTMCNC: 32.6 G/DL — SIGNIFICANT CHANGE UP (ref 32–37)
MCHC RBC-ENTMCNC: 32.9 PG — HIGH (ref 27–31)
MCV RBC AUTO: 101.1 FL — HIGH (ref 80–94)
MONOCYTES # BLD AUTO: 0.83 K/UL — HIGH (ref 0.1–0.6)
MONOCYTES NFR BLD AUTO: 10 % — HIGH (ref 1.7–9.3)
NEUTROPHILS # BLD AUTO: 4.3 K/UL — SIGNIFICANT CHANGE UP (ref 1.4–6.5)
NEUTROPHILS NFR BLD AUTO: 51.9 % — SIGNIFICANT CHANGE UP (ref 42.2–75.2)
NRBC # BLD: 0 /100 WBCS — SIGNIFICANT CHANGE UP (ref 0–0)
NT-PROBNP SERPL-SCNC: 8715 PG/ML — HIGH (ref 0–300)
PCO2 BLDV: 55 MMHG — SIGNIFICANT CHANGE UP (ref 42–55)
PH BLDV: 7.39 — SIGNIFICANT CHANGE UP (ref 7.32–7.43)
PLATELET # BLD AUTO: 163 K/UL — SIGNIFICANT CHANGE UP (ref 130–400)
PMV BLD: 13 FL — HIGH (ref 7.4–10.4)
PO2 BLDV: 22 MMHG — LOW (ref 25–45)
POTASSIUM BLDV-SCNC: 3.6 MMOL/L — SIGNIFICANT CHANGE UP (ref 3.5–5.1)
POTASSIUM SERPL-MCNC: 3.7 MMOL/L — SIGNIFICANT CHANGE UP (ref 3.5–5)
POTASSIUM SERPL-SCNC: 3.7 MMOL/L — SIGNIFICANT CHANGE UP (ref 3.5–5)
PROT SERPL-MCNC: 6.3 G/DL — SIGNIFICANT CHANGE UP (ref 6–8)
RBC # BLD: 3.8 M/UL — LOW (ref 4.7–6.1)
RBC # FLD: 16.6 % — HIGH (ref 11.5–14.5)
RSV RNA NPH QL NAA+NON-PROBE: SIGNIFICANT CHANGE UP
SAO2 % BLDV: 33.1 % — LOW (ref 67–88)
SARS-COV-2 RNA SPEC QL NAA+PROBE: SIGNIFICANT CHANGE UP
SODIUM SERPL-SCNC: 139 MMOL/L — SIGNIFICANT CHANGE UP (ref 135–146)
TROPONIN T, HIGH SENSITIVITY RESULT: 32 NG/L — HIGH (ref 6–21)
TROPONIN T, HIGH SENSITIVITY RESULT: 40 NG/L — HIGH (ref 6–21)
WBC # BLD: 8.28 K/UL — SIGNIFICANT CHANGE UP (ref 4.8–10.8)
WBC # FLD AUTO: 8.28 K/UL — SIGNIFICANT CHANGE UP (ref 4.8–10.8)

## 2024-12-31 PROCEDURE — 82330 ASSAY OF CALCIUM: CPT

## 2024-12-31 PROCEDURE — 84484 ASSAY OF TROPONIN QUANT: CPT | Mod: 59

## 2024-12-31 PROCEDURE — 80053 COMPREHEN METABOLIC PANEL: CPT

## 2024-12-31 PROCEDURE — 82375 ASSAY CARBOXYHB QUANT: CPT

## 2024-12-31 PROCEDURE — 71045 X-RAY EXAM CHEST 1 VIEW: CPT

## 2024-12-31 PROCEDURE — 84132 ASSAY OF SERUM POTASSIUM: CPT

## 2024-12-31 PROCEDURE — 36415 COLL VENOUS BLD VENIPUNCTURE: CPT

## 2024-12-31 PROCEDURE — 94640 AIRWAY INHALATION TREATMENT: CPT

## 2024-12-31 PROCEDURE — 0241U: CPT

## 2024-12-31 PROCEDURE — 85018 HEMOGLOBIN: CPT

## 2024-12-31 PROCEDURE — 84295 ASSAY OF SERUM SODIUM: CPT

## 2024-12-31 PROCEDURE — 99285 EMERGENCY DEPT VISIT HI MDM: CPT | Mod: 25

## 2024-12-31 PROCEDURE — 82803 BLOOD GASES ANY COMBINATION: CPT

## 2024-12-31 PROCEDURE — 99285 EMERGENCY DEPT VISIT HI MDM: CPT | Mod: FS

## 2024-12-31 PROCEDURE — 93010 ELECTROCARDIOGRAM REPORT: CPT | Mod: 76

## 2024-12-31 PROCEDURE — 96374 THER/PROPH/DIAG INJ IV PUSH: CPT

## 2024-12-31 PROCEDURE — 85014 HEMATOCRIT: CPT

## 2024-12-31 PROCEDURE — 85025 COMPLETE CBC W/AUTO DIFF WBC: CPT

## 2024-12-31 PROCEDURE — 83605 ASSAY OF LACTIC ACID: CPT

## 2024-12-31 PROCEDURE — 83880 ASSAY OF NATRIURETIC PEPTIDE: CPT

## 2024-12-31 PROCEDURE — 93005 ELECTROCARDIOGRAM TRACING: CPT

## 2024-12-31 PROCEDURE — 71045 X-RAY EXAM CHEST 1 VIEW: CPT | Mod: 26

## 2024-12-31 RX ORDER — OSELTAMIVIR PHOSPHATE 75 MG
75 CAPSULE ORAL ONCE
Refills: 0 | Status: COMPLETED | OUTPATIENT
Start: 2024-12-31 | End: 2024-12-31

## 2024-12-31 RX ORDER — DEXAMETHASONE 1.5 MG/1
10 TABLET ORAL ONCE
Refills: 0 | Status: COMPLETED | OUTPATIENT
Start: 2024-12-31 | End: 2024-12-31

## 2024-12-31 RX ORDER — OSELTAMIVIR PHOSPHATE 75 MG
1 CAPSULE ORAL
Qty: 10 | Refills: 0
Start: 2024-12-31 | End: 2025-01-04

## 2024-12-31 RX ORDER — IPRATROPIUM BROMIDE AND ALBUTEROL SULFATE 2.5; .5 MG/3ML; MG/3ML
3 SOLUTION RESPIRATORY (INHALATION) ONCE
Refills: 0 | Status: COMPLETED | OUTPATIENT
Start: 2024-12-31 | End: 2024-12-31

## 2024-12-31 RX ADMIN — Medication 75 MILLIGRAM(S): at 17:21

## 2024-12-31 RX ADMIN — DEXAMETHASONE 102 MILLIGRAM(S): 1.5 TABLET ORAL at 14:07

## 2024-12-31 RX ADMIN — IPRATROPIUM BROMIDE AND ALBUTEROL SULFATE 3 MILLILITER(S): 2.5; .5 SOLUTION RESPIRATORY (INHALATION) at 14:07

## 2024-12-31 NOTE — ED PROVIDER NOTE - WHICH SHOWED
EKG normal sinus rhythm 99 with frequent PAC and PVCs, right bundle branch block, unchanged from prior; chest x-ray with chronic opacities

## 2024-12-31 NOTE — ED PROVIDER NOTE - CLINICAL SUMMARY MEDICAL DECISION MAKING FREE TEXT BOX
84-year-old male above past medical history discharged in the hospital yesterday after an admission for CHF and pleural effusions brought in by EMS for evaluation after VNS entered the home and smelled gas, patient himself has no complaints, in triage initial saturation 80 which was spurious, has been in the high 90s on room air throughout his ED course, on exam is awake alert and well-appearing with diffuse expiratory wheeze with faint crackles at bases good air entry no respiratory distress, cor regular, abdomen soft nontender, calves nontender with 1+ bilateral lower extremity edema, labs and studies appreciated, does have influenza however does not require supplemental oxygen, will discharge on Tamiflu with outpatient follow-up, gas situation is resolved, counseled regarding conditions which should prompt return.

## 2024-12-31 NOTE — ED PROVIDER NOTE - PROGRESS NOTE DETAILS
Carboxyhemoglobin normal, O2 sat has been in the mid to high 90s on room air, in no acute distress, will DC.  Patient aware that he has the flu, will DC on Tamiflu

## 2024-12-31 NOTE — ED PROVIDER NOTE - OBJECTIVE STATEMENT
Patient is an 84-year-old male recently discharged from hospital yesterday evaluation of bilateral pleural effusions here for shortness of breath after visiting nurse smelled gas in the house.  Patient denies worsening shortness of breath, fever, chills, cough

## 2024-12-31 NOTE — ED ADULT NURSE NOTE - IN THE PAST 12 MONTHS HAVE YOU USED DRUGS OTHER THAN THOSE REQUIRED FOR MEDICAL REASON?
Group Topic: BH Coping Skills Education    Date: 1/19/2020  Start Time: 1030  End Time: 1120  Facilitators: CAROL Arredondo    Focus: Trauma and Addiction   Number in attendance: 3    Group focused on how trauma can be a reinforcing factor for maladaptive coping skills such as substance use.     Patient engaged in trauma activity however was quiet unless prompted for responses. However when Patient spoke he identified how trauma, boredom as well as anxiety reinforces his drinking so that he does not have to \"think about anything.\" Patient also highlighted not working as a traumatic event that reinforces boredom and relapse.     Method: Group  Attendance: Present  Participation: Active  Patient Response: Attentive, Interested in topic and Quiet  Mood: Anxious and Depressed  Affect: Calm  Behavior/Socialization: Cooperative and Engaged  Thought Process: Tracking  Task Performance: Follows directions     CAROL Arredondo       No

## 2024-12-31 NOTE — ED PROVIDER NOTE - PHYSICAL EXAMINATION
VITAL SIGNS: I have reviewed nursing notes and confirm.  CONSTITUTIONAL: chronically ill-appearing  SKIN:  skin exam is warm and dry, no acute rash.    HEAD: Normocephalic; atraumatic.  EYES: conjunctiva and sclera clear.  ENT: No nasal discharge; airway clear.  CARD: S1, S2 normal; no murmurs, gallops, or rubs. Regular rate and rhythm.   RESP: crackles at bases  ABD: Normal bowel sounds; soft; non-distended; non-tender  EXT: Normal ROM.  No clubbing, cyanosis or edema.   NEURO: Alert, oriented, grossly unremarkable

## 2024-12-31 NOTE — ED PROVIDER NOTE - PATIENT PORTAL LINK FT
You can access the FollowMyHealth Patient Portal offered by [facility] by registering at the following website: [email or web address]. By joining [employer]’s FollowMyHealth portal, you will also be able to view your health information using other applications (apps) compatible with our system.

## 2025-01-02 DIAGNOSIS — I45.2 BIFASCICULAR BLOCK: ICD-10-CM

## 2025-01-02 DIAGNOSIS — Z79.02 LONG TERM (CURRENT) USE OF ANTITHROMBOTICS/ANTIPLATELETS: ICD-10-CM
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
## 2025-01-02 DIAGNOSIS — R29.6 REPEATED FALLS: ICD-10-CM

## 2025-01-02 DIAGNOSIS — E87.6 HYPOKALEMIA: ICD-10-CM

## 2025-01-02 DIAGNOSIS — Z90.81 ACQUIRED ABSENCE OF SPLEEN: ICD-10-CM

## 2025-01-02 DIAGNOSIS — Z87.891 PERSONAL HISTORY OF NICOTINE DEPENDENCE: ICD-10-CM

## 2025-01-02 DIAGNOSIS — I50.21 ACUTE SYSTOLIC (CONGESTIVE) HEART FAILURE: ICD-10-CM

## 2025-01-02 DIAGNOSIS — Z92.3 PERSONAL HISTORY OF IRRADIATION: ICD-10-CM

## 2025-01-02 DIAGNOSIS — S22.079D UNSPECIFIED FRACTURE OF T9-T10 VERTEBRA, SUBSEQUENT ENCOUNTER FOR FRACTURE WITH ROUTINE HEALING: ICD-10-CM
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
## 2025-01-02 DIAGNOSIS — I27.20 PULMONARY HYPERTENSION, UNSPECIFIED: ICD-10-CM

## 2025-01-02 DIAGNOSIS — Z86.19 PERSONAL HISTORY OF OTHER INFECTIOUS AND PARASITIC DISEASES: ICD-10-CM

## 2025-01-02 DIAGNOSIS — Z86.73 PERSONAL HISTORY OF TRANSIENT ISCHEMIC ATTACK (TIA), AND CEREBRAL INFARCTION WITHOUT RESIDUAL DEFICITS: ICD-10-CM

## 2025-01-02 DIAGNOSIS — I95.1 ORTHOSTATIC HYPOTENSION: ICD-10-CM

## 2025-01-02 DIAGNOSIS — I49.1 ATRIAL PREMATURE DEPOLARIZATION: ICD-10-CM

## 2025-01-02 DIAGNOSIS — Z85.828 PERSONAL HISTORY OF OTHER MALIGNANT NEOPLASM OF SKIN: ICD-10-CM

## 2025-01-02 DIAGNOSIS — E78.5 HYPERLIPIDEMIA, UNSPECIFIED: ICD-10-CM

## 2025-01-02 DIAGNOSIS — J98.11 ATELECTASIS: ICD-10-CM

## 2025-01-02 DIAGNOSIS — I11.0 HYPERTENSIVE HEART DISEASE WITH HEART FAILURE: ICD-10-CM

## 2025-01-02 DIAGNOSIS — Z79.899 OTHER LONG TERM (CURRENT) DRUG THERAPY: ICD-10-CM

## 2025-01-02 DIAGNOSIS — R06.02 SHORTNESS OF BREATH: ICD-10-CM

## 2025-01-02 DIAGNOSIS — R13.10 DYSPHAGIA, UNSPECIFIED: ICD-10-CM

## 2025-01-02 DIAGNOSIS — Z79.82 LONG TERM (CURRENT) USE OF ASPIRIN: ICD-10-CM

## 2025-01-02 DIAGNOSIS — K21.9 GASTRO-ESOPHAGEAL REFLUX DISEASE WITHOUT ESOPHAGITIS: ICD-10-CM

## 2025-03-19 ENCOUNTER — APPOINTMENT (OUTPATIENT)
Dept: OTOLARYNGOLOGY | Facility: CLINIC | Age: 85
End: 2025-03-19

## 2025-03-27 ENCOUNTER — APPOINTMENT (OUTPATIENT)
Dept: OTOLARYNGOLOGY | Facility: CLINIC | Age: 85
End: 2025-03-27
Payer: MEDICARE

## 2025-03-27 DIAGNOSIS — H61.22 IMPACTED CERUMEN, LEFT EAR: ICD-10-CM

## 2025-03-27 DIAGNOSIS — R06.02 SHORTNESS OF BREATH: ICD-10-CM

## 2025-03-27 PROCEDURE — 69210 REMOVE IMPACTED EAR WAX UNI: CPT

## 2025-03-27 PROCEDURE — 31575 DIAGNOSTIC LARYNGOSCOPY: CPT

## 2025-03-27 PROCEDURE — 99214 OFFICE O/P EST MOD 30 MIN: CPT | Mod: 25

## 2025-04-23 ENCOUNTER — INPATIENT (INPATIENT)
Facility: HOSPITAL | Age: 85
LOS: 4 days | Discharge: NURSING, REHAB OR LONG-TERM CARE | DRG: 536 | End: 2025-04-28
Admitting: STUDENT IN AN ORGANIZED HEALTH CARE EDUCATION/TRAINING PROGRAM
Payer: MEDICARE

## 2025-04-23 ENCOUNTER — RESULT REVIEW (OUTPATIENT)
Age: 85
End: 2025-04-23

## 2025-04-23 VITALS
WEIGHT: 149.91 LBS | OXYGEN SATURATION: 96 % | HEIGHT: 66 IN | RESPIRATION RATE: 18 BRPM | HEART RATE: 86 BPM | TEMPERATURE: 98 F | SYSTOLIC BLOOD PRESSURE: 124 MMHG | DIASTOLIC BLOOD PRESSURE: 76 MMHG

## 2025-04-23 DIAGNOSIS — Z85.828 PERSONAL HISTORY OF OTHER MALIGNANT NEOPLASM OF SKIN: Chronic | ICD-10-CM

## 2025-04-23 DIAGNOSIS — S72.009A FRACTURE OF UNSPECIFIED PART OF NECK OF UNSPECIFIED FEMUR, INITIAL ENCOUNTER FOR CLOSED FRACTURE: ICD-10-CM

## 2025-04-23 DIAGNOSIS — Z87.828 PERSONAL HISTORY OF OTHER (HEALED) PHYSICAL INJURY AND TRAUMA: Chronic | ICD-10-CM

## 2025-04-23 LAB
ABO RH CONFIRMATION: SIGNIFICANT CHANGE UP
ALBUMIN SERPL ELPH-MCNC: 3.4 G/DL — LOW (ref 3.5–5.2)
ALP SERPL-CCNC: 107 U/L — SIGNIFICANT CHANGE UP (ref 30–115)
ALT FLD-CCNC: 46 U/L — HIGH (ref 0–41)
ANION GAP SERPL CALC-SCNC: 11 MMOL/L — SIGNIFICANT CHANGE UP (ref 7–14)
APTT BLD: 38.6 SEC — SIGNIFICANT CHANGE UP (ref 27–39.2)
AST SERPL-CCNC: 46 U/L — HIGH (ref 0–41)
BASOPHILS # BLD AUTO: 0.02 K/UL — SIGNIFICANT CHANGE UP (ref 0–0.2)
BASOPHILS NFR BLD AUTO: 0.3 % — SIGNIFICANT CHANGE UP (ref 0–1)
BILIRUB SERPL-MCNC: 1.5 MG/DL — HIGH (ref 0.2–1.2)
BLD GP AB SCN SERPL QL: SIGNIFICANT CHANGE UP
BUN SERPL-MCNC: 27 MG/DL — HIGH (ref 10–20)
CALCIUM SERPL-MCNC: 8.8 MG/DL — SIGNIFICANT CHANGE UP (ref 8.4–10.5)
CHLORIDE SERPL-SCNC: 100 MMOL/L — SIGNIFICANT CHANGE UP (ref 98–110)
CO2 SERPL-SCNC: 29 MMOL/L — SIGNIFICANT CHANGE UP (ref 17–32)
CREAT SERPL-MCNC: 2 MG/DL — HIGH (ref 0.7–1.5)
EGFR: 32 ML/MIN/1.73M2 — LOW
EGFR: 32 ML/MIN/1.73M2 — LOW
EOSINOPHIL # BLD AUTO: 0.02 K/UL — SIGNIFICANT CHANGE UP (ref 0–0.7)
EOSINOPHIL NFR BLD AUTO: 0.3 % — SIGNIFICANT CHANGE UP (ref 0–8)
GLUCOSE SERPL-MCNC: 94 MG/DL — SIGNIFICANT CHANGE UP (ref 70–99)
HCT VFR BLD CALC: 32.6 % — LOW (ref 42–52)
HGB BLD-MCNC: 10.6 G/DL — LOW (ref 14–18)
IMM GRANULOCYTES NFR BLD AUTO: 0.3 % — SIGNIFICANT CHANGE UP (ref 0.1–0.3)
INR BLD: 1.45 RATIO — HIGH (ref 0.65–1.3)
LACTATE SERPL-SCNC: 2 MMOL/L — SIGNIFICANT CHANGE UP (ref 0.7–2)
LYMPHOCYTES # BLD AUTO: 0.75 K/UL — LOW (ref 1.2–3.4)
LYMPHOCYTES # BLD AUTO: 11.3 % — LOW (ref 20.5–51.1)
MAGNESIUM SERPL-MCNC: 2.1 MG/DL — SIGNIFICANT CHANGE UP (ref 1.8–2.4)
MCHC RBC-ENTMCNC: 32.5 G/DL — SIGNIFICANT CHANGE UP (ref 32–37)
MCHC RBC-ENTMCNC: 32.5 PG — HIGH (ref 27–31)
MCV RBC AUTO: 100 FL — HIGH (ref 80–94)
MONOCYTES # BLD AUTO: 0.73 K/UL — HIGH (ref 0.1–0.6)
MONOCYTES NFR BLD AUTO: 11 % — HIGH (ref 1.7–9.3)
NEUTROPHILS # BLD AUTO: 5.1 K/UL — SIGNIFICANT CHANGE UP (ref 1.4–6.5)
NEUTROPHILS NFR BLD AUTO: 76.8 % — HIGH (ref 42.2–75.2)
NRBC BLD AUTO-RTO: 0 /100 WBCS — SIGNIFICANT CHANGE UP (ref 0–0)
NT-PROBNP SERPL-SCNC: HIGH PG/ML (ref 0–300)
PLATELET # BLD AUTO: 139 K/UL — SIGNIFICANT CHANGE UP (ref 130–400)
PMV BLD: 13.1 FL — HIGH (ref 7.4–10.4)
POTASSIUM SERPL-MCNC: 4.4 MMOL/L — SIGNIFICANT CHANGE UP (ref 3.5–5)
POTASSIUM SERPL-SCNC: 4.4 MMOL/L — SIGNIFICANT CHANGE UP (ref 3.5–5)
PROT SERPL-MCNC: 7.2 G/DL — SIGNIFICANT CHANGE UP (ref 6–8)
PROTHROM AB SERPL-ACNC: 17.2 SEC — HIGH (ref 9.95–12.87)
RBC # BLD: 3.26 M/UL — LOW (ref 4.7–6.1)
RBC # FLD: 17.6 % — HIGH (ref 11.5–14.5)
SODIUM SERPL-SCNC: 140 MMOL/L — SIGNIFICANT CHANGE UP (ref 135–146)
WBC # BLD: 6.64 K/UL — SIGNIFICANT CHANGE UP (ref 4.8–10.8)
WBC # FLD AUTO: 6.64 K/UL — SIGNIFICANT CHANGE UP (ref 4.8–10.8)

## 2025-04-23 PROCEDURE — 97162 PT EVAL MOD COMPLEX 30 MIN: CPT | Mod: GP

## 2025-04-23 PROCEDURE — 97116 GAIT TRAINING THERAPY: CPT | Mod: GP

## 2025-04-23 PROCEDURE — 71045 X-RAY EXAM CHEST 1 VIEW: CPT | Mod: 26

## 2025-04-23 PROCEDURE — 83880 ASSAY OF NATRIURETIC PEPTIDE: CPT

## 2025-04-23 PROCEDURE — 83735 ASSAY OF MAGNESIUM: CPT

## 2025-04-23 PROCEDURE — 97110 THERAPEUTIC EXERCISES: CPT | Mod: GP

## 2025-04-23 PROCEDURE — 93306 TTE W/DOPPLER COMPLETE: CPT | Mod: 26

## 2025-04-23 PROCEDURE — 80053 COMPREHEN METABOLIC PANEL: CPT

## 2025-04-23 PROCEDURE — 86850 RBC ANTIBODY SCREEN: CPT

## 2025-04-23 PROCEDURE — 73502 X-RAY EXAM HIP UNI 2-3 VIEWS: CPT | Mod: LT

## 2025-04-23 PROCEDURE — 74177 CT ABD & PELVIS W/CONTRAST: CPT | Mod: 26

## 2025-04-23 PROCEDURE — 73502 X-RAY EXAM HIP UNI 2-3 VIEWS: CPT | Mod: 26,LT

## 2025-04-23 PROCEDURE — 85025 COMPLETE CBC W/AUTO DIFF WBC: CPT

## 2025-04-23 PROCEDURE — 73552 X-RAY EXAM OF FEMUR 2/>: CPT | Mod: 26,LT

## 2025-04-23 PROCEDURE — 71045 X-RAY EXAM CHEST 1 VIEW: CPT

## 2025-04-23 PROCEDURE — 85610 PROTHROMBIN TIME: CPT

## 2025-04-23 PROCEDURE — 36415 COLL VENOUS BLD VENIPUNCTURE: CPT

## 2025-04-23 PROCEDURE — 93306 TTE W/DOPPLER COMPLETE: CPT

## 2025-04-23 PROCEDURE — 83540 ASSAY OF IRON: CPT

## 2025-04-23 PROCEDURE — 70450 CT HEAD/BRAIN W/O DYE: CPT | Mod: 26

## 2025-04-23 PROCEDURE — 83605 ASSAY OF LACTIC ACID: CPT

## 2025-04-23 PROCEDURE — 85730 THROMBOPLASTIN TIME PARTIAL: CPT

## 2025-04-23 PROCEDURE — 72170 X-RAY EXAM OF PELVIS: CPT | Mod: 26,XE

## 2025-04-23 PROCEDURE — 84100 ASSAY OF PHOSPHORUS: CPT

## 2025-04-23 PROCEDURE — 99285 EMERGENCY DEPT VISIT HI MDM: CPT | Mod: FS

## 2025-04-23 PROCEDURE — C1713: CPT

## 2025-04-23 PROCEDURE — 82746 ASSAY OF FOLIC ACID SERUM: CPT

## 2025-04-23 PROCEDURE — 82728 ASSAY OF FERRITIN: CPT

## 2025-04-23 PROCEDURE — 76770 US EXAM ABDO BACK WALL COMP: CPT | Mod: 26

## 2025-04-23 PROCEDURE — 72125 CT NECK SPINE W/O DYE: CPT | Mod: 26

## 2025-04-23 PROCEDURE — 83550 IRON BINDING TEST: CPT

## 2025-04-23 PROCEDURE — 82607 VITAMIN B-12: CPT

## 2025-04-23 PROCEDURE — 86901 BLOOD TYPING SEROLOGIC RH(D): CPT

## 2025-04-23 PROCEDURE — 85027 COMPLETE CBC AUTOMATED: CPT

## 2025-04-23 PROCEDURE — 73562 X-RAY EXAM OF KNEE 3: CPT | Mod: 26,LT

## 2025-04-23 PROCEDURE — 76770 US EXAM ABDO BACK WALL COMP: CPT

## 2025-04-23 PROCEDURE — 71260 CT THORAX DX C+: CPT | Mod: 26

## 2025-04-23 PROCEDURE — 86900 BLOOD TYPING SEROLOGIC ABO: CPT

## 2025-04-23 PROCEDURE — 97165 OT EVAL LOW COMPLEX 30 MIN: CPT | Mod: GO

## 2025-04-23 PROCEDURE — 80048 BASIC METABOLIC PNL TOTAL CA: CPT

## 2025-04-23 RX ORDER — MELATONIN 5 MG
3 TABLET ORAL AT BEDTIME
Refills: 0 | Status: DISCONTINUED | OUTPATIENT
Start: 2025-04-23 | End: 2025-04-25

## 2025-04-23 RX ORDER — ATORVASTATIN CALCIUM 80 MG/1
80 TABLET, FILM COATED ORAL AT BEDTIME
Refills: 0 | Status: DISCONTINUED | OUTPATIENT
Start: 2025-04-23 | End: 2025-04-25

## 2025-04-23 RX ORDER — TORSEMIDE 10 MG
20 TABLET ORAL DAILY
Refills: 0 | Status: DISCONTINUED | OUTPATIENT
Start: 2025-04-23 | End: 2025-04-24

## 2025-04-23 RX ORDER — TRAMADOL HYDROCHLORIDE 50 MG/1
25 TABLET, FILM COATED ORAL EVERY 4 HOURS
Refills: 0 | Status: DISCONTINUED | OUTPATIENT
Start: 2025-04-23 | End: 2025-04-25

## 2025-04-23 RX ORDER — FLUDROCORTISONE ACETATE 0.1 MG
0.1 TABLET ORAL DAILY
Refills: 0 | Status: DISCONTINUED | OUTPATIENT
Start: 2025-04-23 | End: 2025-04-25

## 2025-04-23 RX ORDER — TORSEMIDE 10 MG
1 TABLET ORAL
Refills: 0 | DISCHARGE

## 2025-04-23 RX ORDER — ONDANSETRON HCL/PF 4 MG/2 ML
4 VIAL (ML) INJECTION ONCE
Refills: 0 | Status: COMPLETED | OUTPATIENT
Start: 2025-04-23 | End: 2025-04-23

## 2025-04-23 RX ORDER — MAGNESIUM, ALUMINUM HYDROXIDE 200-200 MG
30 TABLET,CHEWABLE ORAL EVERY 4 HOURS
Refills: 0 | Status: DISCONTINUED | OUTPATIENT
Start: 2025-04-23 | End: 2025-04-25

## 2025-04-23 RX ORDER — ACETAMINOPHEN 500 MG/5ML
650 LIQUID (ML) ORAL EVERY 6 HOURS
Refills: 0 | Status: DISCONTINUED | OUTPATIENT
Start: 2025-04-23 | End: 2025-04-25

## 2025-04-23 RX ORDER — SODIUM CHLORIDE 9 G/1000ML
1000 INJECTION, SOLUTION INTRAVENOUS ONCE
Refills: 0 | Status: DISCONTINUED | OUTPATIENT
Start: 2025-04-23 | End: 2025-04-23

## 2025-04-23 RX ORDER — FUROSEMIDE 10 MG/ML
40 INJECTION INTRAMUSCULAR; INTRAVENOUS ONCE
Refills: 0 | Status: COMPLETED | OUTPATIENT
Start: 2025-04-23 | End: 2025-04-23

## 2025-04-23 RX ORDER — SPIRONOLACTONE 25 MG
25 TABLET ORAL DAILY
Refills: 0 | Status: DISCONTINUED | OUTPATIENT
Start: 2025-04-23 | End: 2025-04-25

## 2025-04-23 RX ORDER — ASPIRIN 325 MG
81 TABLET ORAL DAILY
Refills: 0 | Status: DISCONTINUED | OUTPATIENT
Start: 2025-04-23 | End: 2025-04-25

## 2025-04-23 RX ORDER — ESCITALOPRAM OXALATE 20 MG/1
20 TABLET ORAL DAILY
Refills: 0 | Status: DISCONTINUED | OUTPATIENT
Start: 2025-04-23 | End: 2025-04-25

## 2025-04-23 RX ORDER — SODIUM CHLORIDE 9 G/1000ML
500 INJECTION, SOLUTION INTRAVENOUS ONCE
Refills: 0 | Status: COMPLETED | OUTPATIENT
Start: 2025-04-23 | End: 2025-04-23

## 2025-04-23 RX ORDER — COLCHICINE 0.6 MG/1
0.6 TABLET, FILM COATED ORAL DAILY
Refills: 0 | Status: DISCONTINUED | OUTPATIENT
Start: 2025-04-23 | End: 2025-04-25

## 2025-04-23 RX ORDER — TAMSULOSIN HYDROCHLORIDE 0.4 MG/1
0.4 CAPSULE ORAL AT BEDTIME
Refills: 0 | Status: DISCONTINUED | OUTPATIENT
Start: 2025-04-23 | End: 2025-04-25

## 2025-04-23 RX ORDER — SPIRONOLACTONE 25 MG
1 TABLET ORAL
Refills: 0 | DISCHARGE

## 2025-04-23 RX ORDER — MIRTAZAPINE 30 MG/1
15 TABLET, FILM COATED ORAL AT BEDTIME
Refills: 0 | Status: DISCONTINUED | OUTPATIENT
Start: 2025-04-23 | End: 2025-04-25

## 2025-04-23 RX ORDER — HEPARIN SODIUM 1000 [USP'U]/ML
5000 INJECTION INTRAVENOUS; SUBCUTANEOUS EVERY 8 HOURS
Refills: 0 | Status: DISCONTINUED | OUTPATIENT
Start: 2025-04-23 | End: 2025-04-25

## 2025-04-23 RX ORDER — ONDANSETRON HCL/PF 4 MG/2 ML
4 VIAL (ML) INJECTION EVERY 8 HOURS
Refills: 0 | Status: DISCONTINUED | OUTPATIENT
Start: 2025-04-23 | End: 2025-04-25

## 2025-04-23 RX ADMIN — TAMSULOSIN HYDROCHLORIDE 0.4 MILLIGRAM(S): 0.4 CAPSULE ORAL at 21:32

## 2025-04-23 RX ADMIN — FUROSEMIDE 40 MILLIGRAM(S): 10 INJECTION INTRAMUSCULAR; INTRAVENOUS at 12:59

## 2025-04-23 RX ADMIN — ATORVASTATIN CALCIUM 80 MILLIGRAM(S): 80 TABLET, FILM COATED ORAL at 21:32

## 2025-04-23 RX ADMIN — HEPARIN SODIUM 5000 UNIT(S): 1000 INJECTION INTRAVENOUS; SUBCUTANEOUS at 15:10

## 2025-04-23 RX ADMIN — Medication 4 MILLIGRAM(S): at 07:03

## 2025-04-23 RX ADMIN — Medication 25 MILLIGRAM(S): at 15:13

## 2025-04-23 RX ADMIN — SODIUM CHLORIDE 500 MILLILITER(S): 9 INJECTION, SOLUTION INTRAVENOUS at 11:38

## 2025-04-23 RX ADMIN — COLCHICINE 0.6 MILLIGRAM(S): 0.6 TABLET, FILM COATED ORAL at 15:14

## 2025-04-23 RX ADMIN — Medication 20 MILLIGRAM(S): at 15:13

## 2025-04-23 RX ADMIN — ESCITALOPRAM OXALATE 20 MILLIGRAM(S): 20 TABLET ORAL at 15:14

## 2025-04-23 RX ADMIN — Medication 1 APPLICATION(S): at 15:19

## 2025-04-23 RX ADMIN — MIRTAZAPINE 15 MILLIGRAM(S): 30 TABLET, FILM COATED ORAL at 21:33

## 2025-04-23 RX ADMIN — HEPARIN SODIUM 5000 UNIT(S): 1000 INJECTION INTRAVENOUS; SUBCUTANEOUS at 21:32

## 2025-04-23 RX ADMIN — Medication 0.1 MILLIGRAM(S): at 15:09

## 2025-04-23 RX ADMIN — Medication 81 MILLIGRAM(S): at 15:11

## 2025-04-23 NOTE — ED PROVIDER NOTE - PHYSICAL EXAMINATION
Physical Exam    Vital Signs: I have reviewed the initial vital signs.  Constitutional:  no acute distress  Eyes: Conjunctiva pink, Sclera clear, PERRLA, EOMI.  Cardiovascular: S1 and S2, regular rate, regular rhythm, well-perfused extremities, radial pulses equal and 2+  Respiratory: unlabored respiratory effort, clear to auscultation bilaterally no wheezing, rales and rhonchi  Gastrointestinal: soft, non-tender abdomen, no pulsatile mass, normal bowl sounds  Musculoskeletal: supple neck, no lower extremity edema, no midline tenderness + left hip tenderness and leg shortened and externally rotated.   Integumentary: warm, dry, no rash  Neurologic: awake, alert,   extremities’ motor and sensory functions grossly intact  Psychiatric: appropriate mood, appropriate affect

## 2025-04-23 NOTE — ED PROVIDER NOTE - OBJECTIVE STATEMENT
84 year old male past medical history of HTN, HLD, GERD, anemia, TBI, splenectomy, skin CA s/p excision and radiation, Hep C (treated), ex-smoker quit 50 years ago, and CVA comes to emergency room for fall. patient states that he was standing from chair and walker rolled forward and he fell landing on left side. patient denies head injury no loss of consciousness. patient states that since having pain in left hip and couldn't stand and family called 911.

## 2025-04-23 NOTE — PATIENT PROFILE ADULT - FALL HARM RISK - HARM RISK INTERVENTIONS

## 2025-04-23 NOTE — H&P ADULT - HISTORY OF PRESENT ILLNESS
Pt is a 83yo M with a pmhx of HTN, Hyperlipidemia, GERD, anemia, TBI, splenectomy, skin CA s/p excision and radiation, Hep C (treated), ex-smoker quit 50 years ago, systolic CHF, MR/TR/AS, pulmonary htn, orthostatic hypotension, thoracic compression fracture, and CVA whom was BIBA to Ed after a fall at home.  Patient states that he was standing from chair and walker rolled forward and he fell landing on left side. Patient denies head injury no loss of consciousness. Patient states that since having pain in left hip and couldn't stand. Patient denies any chest pain or sob.

## 2025-04-23 NOTE — H&P ADULT - NSICDXPASTMEDICALHX_GEN_ALL_CORE_FT
PAST MEDICAL HISTORY:  Acute systolic congestive heart failure     Anemia     Aortic stenosis     Benign prostate hyperplasia     Compression fracture of vertebra     CVA (cerebral vascular accident)     Depression     Gastroesophageal reflux disease, esophagitis presence not specified     H/O orthostatic hypotension     H/O pulmonary hypertension     Hepatitis C virus infection cured after antiviral drug therapy     HTN (hypertension)     Hypercholesteremia     Mitral regurgitation     Other depression     Skin cancer left ear    Spleen injury     Tricuspid regurgitation

## 2025-04-23 NOTE — ED PROVIDER NOTE - CLINICAL SUMMARY MEDICAL DECISION MAKING FREE TEXT BOX
Patient's status post fall with hip fracture as above, for admission to the medical service at the [facility]

## 2025-04-23 NOTE — CONSULT NOTE ADULT - SUBJECTIVE AND OBJECTIVE BOX
ORTHOPAEDIC SURGERY CONSULT NOTE    Reason for Consult: Hip fracture     HPI: 84 year old male past medical history of HTN, HLD, GERD, anemia, TBI, splenectomy, skin CA s/p excision and radiation, Hep C (treated), ex-smoker quit 50 years ago, and CVA comes to emergency room for fall. patient states that he was standing from chair and walker rolled forward and he fell landing on left side. patient denies head injury no loss of consciousness. patient states that since having pain in left hip and couldn't stand and family called 911.          Family history of breast cancer in female (Mother)    Family history of throat cancer (Father)    MEWS Score    Hypercholesteremia    Gastroesophageal reflux disease, esophagitis presence not specified    Other depression    Hepatitis C virus infection cured after antiviral drug therapy    Skin cancer    Benign prostate hyperplasia    Spleen injury    Depression    CVA (cerebral vascular accident)    Anemia    HTN (hypertension)    H/O spleen injury    History of skin cancer    HIP PAIN    90+    SysAdmin_VisitLink    Home Medications:  Aspirin Low Dose 81 mg oral delayed release tablet: 1 tab(s) orally once a day (26 Dec 2024 09:38)  Flomax 0.4 mg oral capsule: 1 cap(s) orally once a day (at bedtime) (26 Dec 2024 09:38)  Florinef Acetate 0.1 mg oral tablet: 1 tab(s) orally once a day (26 Dec 2024 09:38)  Lexapro 20 mg oral tablet: 1 tab(s) orally once a day (26 Dec 2024 09:38)  mirtazapine 15 mg oral tablet: 1 tab(s) orally once a day (at bedtime) (26 Dec 2024 09:38)  pantoprazole 40 mg oral delayed release tablet: 1 tab(s) orally once a day (26 Dec 2024 09:38)  triamterene-hydrochlorothiazide 37.5 mg-25 mg oral tablet: 1 tab(s) orally once a day Follow up with Dr. Enriquez on Saturday 1/4 at 1PM (30 Dec 2024 11:44)      No Known Allergies      Physical exam:  T(C): 36.6 (04-23-25 @ 04:56), Max: 36.6 (04-23-25 @ 04:56)  HR: 86 (04-23-25 @ 04:56) (86 - 86)  BP: 124/76 (04-23-25 @ 04:56) (124/76 - 124/76)  RR: 18 (04-23-25 @ 04:56) (18 - 18)  SpO2: 96% (04-23-25 @ 04:56) (96% - 96%)    LLE:  The LLE is shortened and externally rotated  +TTP groin  NTTP thigh, knee, tib/fib, foot/ankle  ROM of the foot/ankle full and painless  Pain with motion of the hip  NVID distally in all distributions with 2+ pulses    Labs:                        10.6   6.64  )-----------( 139      ( 23 Apr 2025 09:10 )             32.6     04-23    140  |  100  |  27[H]  ----------------------------<  94  4.4   |  29  |  2.0[H]    Ca    8.8      23 Apr 2025 05:06  Mg     2.1     04-23    TPro  7.2  /  Alb  3.4[L]  /  TBili  1.5[H]  /  DBili  x   /  AST  46[H]  /  ALT  46[H]  /  AlkPhos  107  04-23        Imaging XR: left impacted subcap hip fx     Assesment/Plan: Left hip fracture.    Patient will require surgery to return to ambulation, and decrease morbidity/mortality.  R/B/A of surgery discussed with the patient and they wish to proceed with surgery.    - Bed rest  - Pain control per primary team  - DVT PPx per primary, to be held on morning of procedure  - Patient requires Internal Medicine pre-op clearance / risk stratification / medical optimization  - Pre-Op CBC, CMP/BMP, PT/PTT/INR, Type & Screen x2, CXR, EKG  - Trend Hgb  - 2u RBC on hold for surgery  - NPO for surgery tomorrow pending medical optimization and risk assessment  ORTHOPAEDIC SURGERY CONSULT NOTE    Reason for Consult: Hip fracture     HPI: 84 year old male past medical history of HTN, HLD, GERD, anemia, TBI, splenectomy, skin CA s/p excision and radiation, Hep C (treated), ex-smoker quit 50 years ago, and CVA comes to emergency room for fall. patient states that he was standing from chair and walker rolled forward and he fell landing on left side. patient denies head injury no loss of consciousness. patient states that since having pain in left hip and couldn't stand and family called 911.    Discussed with nursing staff, patient arrived to floor O2 sat in the 70s, suspected fluid overload, surgery pending optimization. Will plan for tomorrow for a hip pinning if the patients medical status permits.       Family history of breast cancer in female (Mother)    Family history of throat cancer (Father)    MEWS Score    Hypercholesteremia    Gastroesophageal reflux disease, esophagitis presence not specified    Other depression    Hepatitis C virus infection cured after antiviral drug therapy    Skin cancer    Benign prostate hyperplasia    Spleen injury    Depression    CVA (cerebral vascular accident)    Anemia    HTN (hypertension)    H/O spleen injury    History of skin cancer    HIP PAIN    90+    SysAdmin_VisitLink    Home Medications:  Aspirin Low Dose 81 mg oral delayed release tablet: 1 tab(s) orally once a day (26 Dec 2024 09:38)  Flomax 0.4 mg oral capsule: 1 cap(s) orally once a day (at bedtime) (26 Dec 2024 09:38)  Florinef Acetate 0.1 mg oral tablet: 1 tab(s) orally once a day (26 Dec 2024 09:38)  Lexapro 20 mg oral tablet: 1 tab(s) orally once a day (26 Dec 2024 09:38)  mirtazapine 15 mg oral tablet: 1 tab(s) orally once a day (at bedtime) (26 Dec 2024 09:38)  pantoprazole 40 mg oral delayed release tablet: 1 tab(s) orally once a day (26 Dec 2024 09:38)  triamterene-hydrochlorothiazide 37.5 mg-25 mg oral tablet: 1 tab(s) orally once a day Follow up with Dr. Enriquez on Saturday 1/4 at 1PM (30 Dec 2024 11:44)      No Known Allergies      Physical exam:  Vital Signs Last 24 Hrs  T(C): 36.3 (23 Apr 2025 11:32), Max: 36.6 (23 Apr 2025 04:56)  T(F): 97.4 (23 Apr 2025 11:32), Max: 97.8 (23 Apr 2025 04:56)  HR: 84 (23 Apr 2025 11:32) (84 - 86)  BP: 138/79 (23 Apr 2025 11:32) (124/76 - 138/79)  BP(mean): --  RR: 20 (23 Apr 2025 11:32) (18 - 20)  SpO2: 94% (23 Apr 2025 11:32) (94% - 96%)    Parameters below as of 23 Apr 2025 11:32  Patient On (Oxygen Delivery Method): nonrebreather        LLE:  The LLE is shortened and externally rotated  +TTP groin, ecchymosis to lateral hip   NTTP thigh, knee, tib/fib, foot/ankle  ROM of the foot/ankle full and painless  Pain with motion of the hip  NVID distally in all distributions with 2+ pulses    Labs:                        10.6   6.64  )-----------( 139      ( 23 Apr 2025 09:10 )             32.6     04-23    140  |  100  |  27[H]  ----------------------------<  94  4.4   |  29  |  2.0[H]    Ca    8.8      23 Apr 2025 05:06  Mg     2.1     04-23    TPro  7.2  /  Alb  3.4[L]  /  TBili  1.5[H]  /  DBili  x   /  AST  46[H]  /  ALT  46[H]  /  AlkPhos  107  04-23        Imaging XR: left impacted subcap hip fx     Assesment/Plan: Left hip fracture.    Patient will require a left hip pinning to return to ambulation, and decrease morbidity/mortality.  R/B/A of surgery discussed with the patient and they wish to proceed with surgery.    - Bed rest  - Pain control per primary team  - DVT PPx per primary, to be held on morning of procedure  - Patient requires Internal Medicine pre-op clearance / risk stratification / medical optimization  - Pre-Op CBC, CMP/BMP, PT/PTT/INR, Type & Screen x2, CXR, EKG  - Trend Hgb  - 2u RBC on hold for surgery  - NPO for surgery tomorrow pending medical optimization and risk assessment

## 2025-04-23 NOTE — ED PROVIDER NOTE - NS ED ATTENDING STATEMENT MOD
This was a shared visit with the LORRI. I reviewed and verified the documentation. Alert and oriented to person, place and time

## 2025-04-23 NOTE — H&P ADULT - ASSESSMENT
Pt is a 83yo M with a pmhx of HTN, Hyperlipidemia, GERD, anemia, TBI, splenectomy, skin CA s/p excision and radiation, Hep C (treated), ex-smoker quit 50 years ago, systolic CHF, MR/TR/AS, pulmonary htn, orthostatic hypotension, thoracic compression fracture, and CVA whom was BIBA to Ed after a fall at home.  Patient states that he was standing from chair and walker rolled forward and he fell landing on left side. Patient denies head injury no loss of consciousness. Patient states that since having pain in left hip and couldn't stand. Patient denies any chest pain or sob.     #hip fracture  -bedrest/NWB LLE  -ortho consult  -npo p midnite for surgery  -check T&S, coags  -keep 2 units on hold for surgery  -hold plavix  -d/w ortho team  -pain meds prn tylneol, tramadol, morphine  -dvt prophylaxis with sq heparin  -chg bath    #anemia, likely of chronic dz,  -check iron, tibc, ferritin, b12, folate  -monitor cbc    #Chronic systolic CHF  #moderate pericardial effusion  #valvular heart dz with pulmonary htn  -consult cardiology  -daily weights, I&O    #ASHLEY  -received 500cc LR in ED  -will hod off on ivf being CHF/signs of fluid overlaod  -check urine sodium, creatnine  -r/b sono    #orthostatic hypotension  -c/w florinef    #compression fractures lumbar and thoracic  -no acute back pain  -will need TLSO brace after surgery/when ambulating    #CVA hx  -c/w lipitor and asa  -hold plavix        #will need further clarification of home meds from spouse  #d/w Dr Nazario Pt is a 85yo M with a pmhx of HTN, Hyperlipidemia, GERD, anemia, TBI, splenectomy, skin CA s/p excision and radiation, Hep C (treated), ex-smoker quit 50 years ago, systolic CHF, MR/TR/AS, pulmonary htn, orthostatic hypotension, thoracic compression fracture, and CVA whom was BIBA to Ed after a fall at home.  Patient states that he was standing from chair and walker rolled forward and he fell landing on left side. Patient denies head injury no loss of consciousness. Patient states that since having pain in left hip and couldn't stand. Patient denies any chest pain or sob.     #hip fracture  -bedrest/NWB LLE  -ortho consult  -npo p midnite for surgery  -check T&S, coags  -keep 2 units on hold for surgery  -hold plavix  -d/w ortho team  -pain meds prn tylneol, tramadol, morphine  -dvt prophylaxis with sq heparin  -chg bath    #anemia, likely of chronic dz,  -check iron, tibc, ferritin, b12, folate  -monitor cbc    #Chronic systolic CHF  #moderate pericardial effusion  #valvular heart dz with pulmonary htn  -daily weights, I&O  -start colchicine 0.6mg daily    #ASHLEY  -received 500cc LR in ED  -will hold off on ivf being CHF/signs of fluid overlaod  -check urine sodium, creatinine  -r/b sono    #orthostatic hypotension  -c/w florinef    #compression fractures lumbar and thoracic  -no acute back pain  -will need TLSO brace after surgery/when ambulating    #CVA hx  -c/w lipitor and asa  -hold plavix        #will need further clarification of home meds from spouse  #d/w Dr KENYON Pt is a 83yo M with a pmhx of HTN, Hyperlipidemia, GERD, anemia, TBI, splenectomy, skin CA s/p excision and radiation, Hep C (treated), ex-smoker quit 50 years ago, systolic CHF, MR/TR/AS, pulmonary htn, orthostatic hypotension, thoracic compression fracture, and CVA whom was BIBA to Ed after a fall at home.  Patient states that he was standing from chair and walker rolled forward and he fell landing on left side. Patient denies head injury no loss of consciousness. Patient states that since having pain in left hip and couldn't stand. Patient denies any chest pain or sob.     #hip fracture  -bedrest/NWB LLE  -ortho consult  -npo p midnite for surgery  -check T&S, coags  -keep 2 units on hold for surgery  -hold plavix  -d/w ortho team  -pain meds prn tylneol, tramadol, morphine  -dvt prophylaxis with sq heparin  -chg bath    #anemia, likely of chronic dz,  -check iron, tibc, ferritin, b12, folate  -monitor cbc    #Chronic systolic CHF  #moderate pericardial effusion  #valvular heart dz with pulmonary htn  -daily weights, I&O  -start colchicine 0.6mg daily  -c/w torsemide and spironolcatone    #CKD  -creatinine baseline around 1.8 as per Dr Enriquez  creatinine is 2   -received 500cc LR in ED  -will hold off on ivf being CHF/signs of fluid overlaod  -check urine sodium, creatinine  -monitor I&O and bmp  -r/b sono    #orthostatic hypotension  -c/w florinef    #compression fractures lumbar and thoracic  -no acute back pain  -will need TLSO brace after surgery/when ambulating    #CVA hx  -c/w lipitor and asa  -hold plavix        #will need further clarification of home meds from spouse  #d/w Dr ENRIQUEZ Pt is a 83yo M with a pmhx of HTN, Hyperlipidemia, GERD, anemia, TBI, splenectomy, skin CA s/p excision and radiation, Hep C (treated), ex-smoker quit 50 years ago, systolic CHF, MR/TR/AS, pulmonary htn, orthostatic hypotension, thoracic compression fracture, and CVA whom was BIBA to Ed after a fall at home.  Patient states that he was standing from chair and walker rolled forward and he fell landing on left side. Patient denies head injury no loss of consciousness. Patient states that since having pain in left hip and couldn't stand. Patient denies any chest pain or sob.     #hip fracture  -bedrest/NWB LLE  -ortho consult  -npo p midnite for surgery  -check T&S, coags  -keep 2 units on hold for surgery  -hold plavix  -d/w ortho team  -pain meds prn tylneol, tramadol, morphine  -dvt prophylaxis with sq heparin  -chg bath    #anemia, likely of chronic dz,  -check iron, tibc, ferritin, b12, folate  -monitor cbc    #Chronic systolic CHF  #moderate pericardial effusion  #valvular heart dz with pulmonary htn  -daily weights, I&O  -start colchicine 0.6mg daily  -c/w torsemide and spironolactone  -give dose of lasix today    #CKD  -creatinine baseline around 1.8 as per Dr Enriquez  creatinine is 2   -received 500cc LR in ED  -will hold off on ivf being CHF/signs of fluid overlaod  -check urine sodium, creatinine  -monitor I&O and bmp  -r/b sono    #orthostatic hypotension  -c/w florinef    #compression fractures lumbar and thoracic  -no acute back pain  -will need TLSO brace after surgery/when ambulating    #CVA hx  -c/w lipitor and asa  -hold plavix        #spoke with pt spouse, home meds reviewed  #d/w Dr ENRIQUEZ Pt is a 83yo M with a pmhx of HTN, Hyperlipidemia, GERD, anemia, TBI, splenectomy, skin CA s/p excision and radiation, Hep C (treated), ex-smoker quit 50 years ago, systolic CHF, MR/TR/AS, pulmonary htn, orthostatic hypotension, thoracic compression fracture, and CVA whom was BIBA to Ed after a fall at home.  Patient states that he was standing from chair and walker rolled forward and he fell landing on left side. Patient denies head injury no loss of consciousness. Patient states that since having pain in left hip and couldn't stand. Patient denies any chest pain or sob.     #  Left hip fracture    -bedrest/NWB LLE  -ortho consult  -npo p midnite for surgery  -check T&S, coags  -keep 2 units on hold for surgery  -hold plavix  -d/w ortho team  -pain meds prn tylneol, tramadol, morphine  -dvt prophylaxis with sq heparin  -chg bath    #anemia, likely of chronic dz,  -check iron, tibc, ferritin, b12, folate  -monitor cbc    #Chronic systolic CHF  #moderate pericardial effusion - 2/2 CKD likely   #valvular heart dz with pulmonary htn  -daily weights, I&O  -start colchicine 0.6mg daily  -c/w torsemide and spironolactone  -give dose of lasix today    #CKD3  -creatinine baseline around 1.8 as per Dr Enriquez  creatinine is 2 , about baseline   -received 500cc LR in ED  -will hold off on ivf being CHF/signs of fluid overlaod  -check urine sodium, creatinine  -monitor I&O and bmp  -r/b sono    #orthostatic hypotension  -c/w florinef    #compression fractures lumbar and thoracic  -no acute back pain  -will need TLSO brace after surgery/when ambulating    #CVA hx  -c/w lipitor and asa  -hold plavix        #spoke with pt spouse, home meds reviewed  #d/w Dr ENRIQUEZ

## 2025-04-23 NOTE — H&P ADULT - CLICK TO LAUNCH ORM
. Constitution: No Fever or chills, No Weight Loss,   Eyes: No visual changes  HEENT: (+) cough, No Discharge, No Rhinorrhea, No URI symptoms  Cardio: No Chest pain, No Palpitations, (+) PRESTON  Resp: (+) PRESTON, No Wheezing  GI: No abdominal pain, No Nausea, No Vomiting, No Constipation, No Diarrhea  : No burning upon urination, trouble urinating, no foul odor from urine  MSK: No Back pain, No Numbness, No Tingling, No Weakness  Neuro: No Headache, Normal Gait  Skin: No rashes, No Bruising, No Swelling

## 2025-04-23 NOTE — ED ADULT NURSE NOTE - SUICIDE SCREENING DEPRESSION
Received recovery report from anesthesia team, see anesthesia note. Abdomen remains soft and non-tender post-procedure. Pt has no complaints at this time and tolerated procedure well. Endoscope was pre-cleaned at the bedside by Deni Davila immediately following procedure. Post recovery report given to Meron Porter RN.    Negative

## 2025-04-23 NOTE — ED ADULT NURSE NOTE - NSFALLHARMRISKINTERV_ED_ALL_ED

## 2025-04-23 NOTE — ED PROVIDER NOTE - ATTENDING APP SHARED VISIT CONTRIBUTION OF CARE
I reviewed and verified the documentation and  and independently performed the documented  MDM.  84-year-old male history of CHF EF 30%, bilateral pleural effusions, HTN, HLD, splenectomy, hep C, on Plavix presents status post mechanical fall.  Patient uses walker was walking tonight walker slipped forward and patient fell onto his left side.  No LOC ambulance picked the patient from the floor.  left  lef  shortened internall rotated

## 2025-04-23 NOTE — H&P ADULT - NSHPLABSRESULTS_GEN_ALL_CORE
10.6   6.64  )-----------( 139      ( 23 Apr 2025 09:10 )             32.6       04-23    140  |  100  |  27[H]  ----------------------------<  94  4.4   |  29  |  2.0[H]    Ca    8.8      23 Apr 2025 05:06  Mg     2.1     04-23    TPro  7.2  /  Alb  3.4[L]  /  TBili  1.5[H]  /  DBili  x   /  AST  46[H]  /  ALT  46[H]  /  AlkPhos  107  04-23              Urinalysis Basic - ( 23 Apr 2025 05:06 )    Color: x / Appearance: x / SG: x / pH: x  Gluc: 94 mg/dL / Ketone: x  / Bili: x / Urobili: x   Blood: x / Protein: x / Nitrite: x   Leuk Esterase: x / RBC: x / WBC x   Sq Epi: x / Non Sq Epi: x / Bacteria: x    < from: CT Abdomen and Pelvis w/ IV Cont (04.23.25 @ 09:06) >      IMPRESSION:    Left hip subcapital femoral fracture.    T9 and L2 compression deformities of indeterminate age, as described.   Stable T12 compression deformity.    Cardiomegaly with moderate pericardial effusion.    Cirrhosis with portal hypertension as described. No definite hepatic   lesion.    Small bilateral pleural effusions with adjacent groundglass opacity may   reflect atelectasis.    Additional findings are detailed throughout the body of the report.    < end of copied text >    < from: CT Head No Cont (04.23.25 @ 09:00) >      CT CERVICAL SPINE:  No acute cervical fracture or facet subluxation.    Partially imaged bilateral pleural effusions.    < end of copied text >

## 2025-04-23 NOTE — H&P ADULT - NSHPPHYSICALEXAM_GEN_ALL_CORE
Vital Signs Last 24 Hrs  T(C): 36.3 (23 Apr 2025 11:32), Max: 36.6 (23 Apr 2025 04:56)  T(F): 97.4 (23 Apr 2025 11:32), Max: 97.8 (23 Apr 2025 04:56)  HR: 84 (23 Apr 2025 11:32) (84 - 86)  BP: 138/79 (23 Apr 2025 11:32) (124/76 - 138/79)  BP(mean): --  RR: 20 (23 Apr 2025 11:32) (18 - 20)  SpO2: 94% (23 Apr 2025 11:32) (94% - 96%)    PHYSICAL EXAM:      Constitutional: A&Ox4, NAD, slow to respond to questions   Respiratory: trace crackles left lung base, no wheeze, no rhonchi, good air entry b/l   Cardiovascular: s1 s2 rrr  Gastrointestinal: soft nt  nd + bs no rebound or guarding  Genitourinary: no cva tenderness  Extremities: normal rom, +b/l le edema foot to calf, R>L, no calf tenderness  Neurological:no focal deficits  Skin: no rash

## 2025-04-23 NOTE — ED PROVIDER NOTE - PROGRESS NOTE DETAILS
Patient has no midline spinal tenderness, orthopedics made aware of subcapital fracture, awaiting further plan ortho recommends keeping pt here for further management

## 2025-04-24 DIAGNOSIS — S72.001A FRACTURE OF UNSPECIFIED PART OF NECK OF RIGHT FEMUR, INITIAL ENCOUNTER FOR CLOSED FRACTURE: ICD-10-CM

## 2025-04-24 DIAGNOSIS — Z51.5 ENCOUNTER FOR PALLIATIVE CARE: ICD-10-CM

## 2025-04-24 DIAGNOSIS — I50.23 ACUTE ON CHRONIC SYSTOLIC (CONGESTIVE) HEART FAILURE: ICD-10-CM

## 2025-04-24 DIAGNOSIS — S72.002A FRACTURE OF UNSPECIFIED PART OF NECK OF LEFT FEMUR, INITIAL ENCOUNTER FOR CLOSED FRACTURE: ICD-10-CM

## 2025-04-24 DIAGNOSIS — R52 PAIN, UNSPECIFIED: ICD-10-CM

## 2025-04-24 DIAGNOSIS — Z71.89 OTHER SPECIFIED COUNSELING: ICD-10-CM

## 2025-04-24 LAB
ANION GAP SERPL CALC-SCNC: 13 MMOL/L — SIGNIFICANT CHANGE UP (ref 7–14)
BASOPHILS # BLD AUTO: 0.03 K/UL — SIGNIFICANT CHANGE UP (ref 0–0.2)
BASOPHILS NFR BLD AUTO: 0.3 % — SIGNIFICANT CHANGE UP (ref 0–1)
BUN SERPL-MCNC: 31 MG/DL — HIGH (ref 10–20)
CALCIUM SERPL-MCNC: 8.1 MG/DL — LOW (ref 8.4–10.5)
CHLORIDE SERPL-SCNC: 103 MMOL/L — SIGNIFICANT CHANGE UP (ref 98–110)
CO2 SERPL-SCNC: 27 MMOL/L — SIGNIFICANT CHANGE UP (ref 17–32)
CREAT SERPL-MCNC: 2 MG/DL — HIGH (ref 0.7–1.5)
EGFR: 32 ML/MIN/1.73M2 — LOW
EGFR: 32 ML/MIN/1.73M2 — LOW
EOSINOPHIL # BLD AUTO: 0.04 K/UL — SIGNIFICANT CHANGE UP (ref 0–0.7)
EOSINOPHIL NFR BLD AUTO: 0.4 % — SIGNIFICANT CHANGE UP (ref 0–8)
FERRITIN SERPL-MCNC: 273 NG/ML — SIGNIFICANT CHANGE UP (ref 30–400)
FOLATE SERPL-MCNC: 6.6 NG/ML — SIGNIFICANT CHANGE UP
GLUCOSE SERPL-MCNC: 86 MG/DL — SIGNIFICANT CHANGE UP (ref 70–99)
HCT VFR BLD CALC: 32.9 % — LOW (ref 42–52)
HGB BLD-MCNC: 10.5 G/DL — LOW (ref 14–18)
IMM GRANULOCYTES NFR BLD AUTO: 0.4 % — HIGH (ref 0.1–0.3)
IRON SATN MFR SERPL: 10 % — LOW (ref 15–50)
IRON SATN MFR SERPL: 24 UG/DL — LOW (ref 35–150)
LYMPHOCYTES # BLD AUTO: 0.77 K/UL — LOW (ref 1.2–3.4)
LYMPHOCYTES # BLD AUTO: 8.4 % — LOW (ref 20.5–51.1)
MCHC RBC-ENTMCNC: 31.9 G/DL — LOW (ref 32–37)
MCHC RBC-ENTMCNC: 32.7 PG — HIGH (ref 27–31)
MCV RBC AUTO: 102.5 FL — HIGH (ref 80–94)
MONOCYTES # BLD AUTO: 0.9 K/UL — HIGH (ref 0.1–0.6)
MONOCYTES NFR BLD AUTO: 9.8 % — HIGH (ref 1.7–9.3)
NEUTROPHILS # BLD AUTO: 7.42 K/UL — HIGH (ref 1.4–6.5)
NEUTROPHILS NFR BLD AUTO: 80.7 % — HIGH (ref 42.2–75.2)
NRBC BLD AUTO-RTO: 0 /100 WBCS — SIGNIFICANT CHANGE UP (ref 0–0)
PLATELET # BLD AUTO: 144 K/UL — SIGNIFICANT CHANGE UP (ref 130–400)
PMV BLD: 14.3 FL — HIGH (ref 7.4–10.4)
POTASSIUM SERPL-MCNC: 3.5 MMOL/L — SIGNIFICANT CHANGE UP (ref 3.5–5)
POTASSIUM SERPL-SCNC: 3.5 MMOL/L — SIGNIFICANT CHANGE UP (ref 3.5–5)
RBC # BLD: 3.21 M/UL — LOW (ref 4.7–6.1)
RBC # FLD: 17.6 % — HIGH (ref 11.5–14.5)
SODIUM SERPL-SCNC: 143 MMOL/L — SIGNIFICANT CHANGE UP (ref 135–146)
TIBC SERPL-MCNC: 252 UG/DL — LOW (ref 260–400)
UIBC SERPL-MCNC: 228 UG/DL — SIGNIFICANT CHANGE UP (ref 110–370)
VIT B12 SERPL-MCNC: 704 PG/ML — SIGNIFICANT CHANGE UP (ref 232–1245)
WBC # BLD: 9.2 K/UL — SIGNIFICANT CHANGE UP (ref 4.8–10.8)
WBC # FLD AUTO: 9.2 K/UL — SIGNIFICANT CHANGE UP (ref 4.8–10.8)

## 2025-04-24 PROCEDURE — 99223 1ST HOSP IP/OBS HIGH 75: CPT

## 2025-04-24 RX ORDER — FUROSEMIDE 10 MG/ML
40 INJECTION INTRAMUSCULAR; INTRAVENOUS ONCE
Refills: 0 | Status: COMPLETED | OUTPATIENT
Start: 2025-04-24 | End: 2025-04-24

## 2025-04-24 RX ORDER — FUROSEMIDE 10 MG/ML
40 INJECTION INTRAMUSCULAR; INTRAVENOUS
Refills: 0 | Status: DISCONTINUED | OUTPATIENT
Start: 2025-04-24 | End: 2025-04-25

## 2025-04-24 RX ORDER — HYDROMORPHONE/SOD CHLOR,ISO/PF 2 MG/10 ML
0.2 SYRINGE (ML) INJECTION EVERY 4 HOURS
Refills: 0 | Status: DISCONTINUED | OUTPATIENT
Start: 2025-04-24 | End: 2025-04-25

## 2025-04-24 RX ADMIN — MIRTAZAPINE 15 MILLIGRAM(S): 30 TABLET, FILM COATED ORAL at 23:05

## 2025-04-24 RX ADMIN — Medication 25 MILLIGRAM(S): at 05:28

## 2025-04-24 RX ADMIN — Medication 81 MILLIGRAM(S): at 12:24

## 2025-04-24 RX ADMIN — Medication 40 MILLIGRAM(S): at 05:30

## 2025-04-24 RX ADMIN — ATORVASTATIN CALCIUM 80 MILLIGRAM(S): 80 TABLET, FILM COATED ORAL at 21:47

## 2025-04-24 RX ADMIN — FUROSEMIDE 40 MILLIGRAM(S): 10 INJECTION INTRAMUSCULAR; INTRAVENOUS at 09:49

## 2025-04-24 RX ADMIN — Medication 20 MILLIGRAM(S): at 05:29

## 2025-04-24 RX ADMIN — Medication 1 APPLICATION(S): at 05:29

## 2025-04-24 RX ADMIN — FUROSEMIDE 40 MILLIGRAM(S): 10 INJECTION INTRAMUSCULAR; INTRAVENOUS at 14:22

## 2025-04-24 RX ADMIN — COLCHICINE 0.6 MILLIGRAM(S): 0.6 TABLET, FILM COATED ORAL at 12:25

## 2025-04-24 RX ADMIN — HEPARIN SODIUM 5000 UNIT(S): 1000 INJECTION INTRAVENOUS; SUBCUTANEOUS at 14:22

## 2025-04-24 RX ADMIN — TAMSULOSIN HYDROCHLORIDE 0.4 MILLIGRAM(S): 0.4 CAPSULE ORAL at 21:47

## 2025-04-24 RX ADMIN — Medication 0.1 MILLIGRAM(S): at 05:29

## 2025-04-24 RX ADMIN — HEPARIN SODIUM 5000 UNIT(S): 1000 INJECTION INTRAVENOUS; SUBCUTANEOUS at 21:47

## 2025-04-24 RX ADMIN — ESCITALOPRAM OXALATE 20 MILLIGRAM(S): 20 TABLET ORAL at 12:25

## 2025-04-24 NOTE — CONSULT NOTE ADULT - ASSESSMENT
84M with PMH of HTN, HLD, GERD, anemia, TBI, splenectomy, skin cancer, HCV, former smoker, HFrEF, pulmonary HTN, orthostatic hypotension, CVA here after fall at home, and found to have a R hip fracture, pending orthopedic intervention. Patient has chronic HFrEF, on diuretics, and CKD3 (baseline SCr around 1.8). Palliative consulted for GOC.   84M with PMH of HTN, HLD, GERD, anemia, TBI, splenectomy, skin cancer, HCV, former smoker, HFrEF, pulmonary HTN, orthostatic hypotension, CVA here after fall at home, and found to have a R hip fracture, pending orthopedic intervention. Patient has chronic HFrEF, on diuretics, and CKD3 (baseline SCr around 1.8). Palliative consulted for GOC.    - awaiting ortho intervention  - given CKD3, avoid morphine, will use dilaudid 0.2mg IV Q4 PRN mod-severe pain instead  - patient is not aware of his diagnosis of HFrEF, EF 25-30% on recent TTE, will need further discussion with primary doctor  - DNR/DNI, patient aware this may be rescinded for OR  - will follow    ______________  Ti Cooper MD  Palliative Medicine  [facility]   of Geriatric and Palliative Medicine  [phone]

## 2025-04-24 NOTE — CONSULT NOTE ADULT - CONVERSATION DETAILS
Introduced role of palliative care to patient. Lives with wife, and handles ADLs, and has daughter for support as well. States having a good quality of life at home. Is aware of his current medical situation, and is awaiting surgery tomorrow. Discussed ACP, states having a living will, and is opting for DNR/DNI.

## 2025-04-24 NOTE — CONSULT NOTE ADULT - SUBJECTIVE AND OBJECTIVE BOX
HPI: 84M with PMH of HTN, HLD, GERD, anemia, TBI, splenectomy, skin cancer, HCV, former smoker, HFrEF, pulmonary HTN, orthostatic hypotension, CVA here after fall at home, and found to have a R hip fracture, pending orthopedic intervention. Patient has chronic HFrEF, on diuretics, and CKD3 (baseline SCr around 1.8). Palliative consulted for GOC.    PERTINENT PM/SXH:   Hypercholesteremia    Gastroesophageal reflux disease, esophagitis presence not specified    Other depression    Hepatitis C virus infection cured after antiviral drug therapy    Skin cancer    Benign prostate hyperplasia    Spleen injury    Depression    CVA (cerebral vascular accident)    Anemia    HTN (hypertension)    Acute systolic congestive heart failure    H/O orthostatic hypotension    Tricuspid regurgitation    H/O pulmonary hypertension    Mitral regurgitation    Aortic stenosis    Compression fracture of vertebra      H/O spleen injury    History of skin cancer      FAMILY HISTORY:  Family history of breast cancer in female (Mother)    Family history of throat cancer (Father)    no pertinent family history      SOCIAL HISTORY:   Significant other/partner[X ]  Children[ ]  Yazidism/Spirituality:  Substance hx:  [ ]   Tobacco hx:  [ ]   Alcohol hx: [ ]   Living Situation: [ ]Home  [ ]Long term care  [ ]Rehab [ ]Other  Home Services: [ ] HHA [ ] Visting RN [ ] Hospice  Occupation:  Home Opioid hx:  [ ] Y [ ] N [ ] I-Stop Reference No: [id number]    ADVANCE DIRECTIVES:    [ ] Full Code [ ] DNR  MOLST  [ ]  Living Will  [ ]   DECISION MAKER(s):  [ ] Health Care Proxy(s)  [ ] Surrogate(s)  [ ] Guardian           Name(s): Phone Number(s):    BASELINE (I)ADL(s) (prior to admission):  [place]: [ ]Total  [ ] Moderate [ ]Dependent  Palliative Performance Status Version 2:         %    http://npcrc.org/files/news/palliative_performance_scale_ppsv2.pdf    Allergies    No Known Allergies    Intolerances    MEDICATIONS  (STANDING):  aspirin enteric coated 81 milliGRAM(s) Oral daily  atorvastatin 80 milliGRAM(s) Oral at bedtime  chlorhexidine 4% Liquid 1 Application(s) Topical <User Schedule>  colchicine 0.6 milliGRAM(s) Oral daily  escitalopram 20 milliGRAM(s) Oral daily  fludroCORTISONE 0.1 milliGRAM(s) Oral daily  furosemide   Injectable 40 milliGRAM(s) IV Push two times a day  heparin   Injectable 5000 Unit(s) SubCutaneous every 8 hours  mirtazapine 15 milliGRAM(s) Oral at bedtime  pantoprazole    Tablet 40 milliGRAM(s) Oral before breakfast  spironolactone 25 milliGRAM(s) Oral daily  tamsulosin 0.4 milliGRAM(s) Oral at bedtime    MEDICATIONS  (PRN):  acetaminophen     Tablet .. 650 milliGRAM(s) Oral every 6 hours PRN Temp greater or equal to 38C (100.4F), Mild Pain (1 - 3)  aluminum hydroxide/magnesium hydroxide/simethicone Suspension 30 milliLiter(s) Oral every 4 hours PRN Dyspepsia  melatonin 3 milliGRAM(s) Oral at bedtime PRN Insomnia  morphine  - Injectable 2 milliGRAM(s) IV Push every 4 hours PRN Severe Pain (7 - 10)  ondansetron Injectable 4 milliGRAM(s) IV Push every 8 hours PRN Nausea and/or Vomiting  traMADol 25 milliGRAM(s) Oral every 4 hours PRN Moderate Pain (4 - 6)      PRESENT SYMPTOMS: [ ]Unable to obtain due to poor mentation   Source if other than patient:  [ ]Family   [ ]Team   [ X]All review of systems negative including pain and dyspnea unless noted below    All components of pain assessment below addressed. Blank spaces indicate that the patient did/could not complete the assessment.  Pain: [ ]yes [ ]no  QOL impact -   Location -                    Aggravating factors -  Quality -  Radiation -  Timing-  Severity (0-10 scale):  Minimal acceptable level (0-10 scale):     CPOT:    https://www.[facility].org/getattachment/[email or web address]/Critical-Care-Pain-Observation-Tool-(CPOT)    PAIN AD Score:   http://geriatrictoolkit.[place].[facility]/cog/painad.pdf (press ctrl +  left click to view)    Dyspnea:                           [ ]None[ ]Mild [ ]Moderate [ ]Severe     Respiratory Distress Observation Scale (RDOS):   A score of 0 to 2 signifies little or no respiratory distress, 3 signifies mild distress, scores 4 to 6 indicate moderate distress, and scores greater than 7 signify severe distress  [email or web address]/sites/default/files/PDFS/[email or web address].pdf    Anxiety:                             [ ]None[ ]Mild [ ]Moderate [ ]Severe   Fatigue:                             [ ]None[ ]Mild [ ]Moderate [ ]Severe   Nausea:                             [ ]None[ ]Mild [ ]Moderate [ ]Severe   Loss of appetite:              [ ]None[ ]Mild [ ]Moderate [ ]Severe   Constipation:                    [ ]None[ ]Mild [ ]Moderate [ ]Severe    Other Symptoms:    PHYSICAL EXAM:  Vital Signs Last 24 Hrs  T(C): 36.6 (24 Apr 2025 05:39), Max: 36.8 (23 Apr 2025 21:14)  T(F): 97.9 (24 Apr 2025 05:39), Max: 98.3 (23 Apr 2025 21:14)  HR: 50 (24 Apr 2025 05:39) (50 - 84)  BP: 113/72 (24 Apr 2025 05:39) (113/72 - 138/79)  BP(mean): 97 (23 Apr 2025 12:15) (97 - 97)  RR: 16 (24 Apr 2025 05:39) (16 - 20)  SpO2: 92% (24 Apr 2025 05:39) (92% - 96%)    Parameters below as of 23 Apr 2025 21:14  Patient On (Oxygen Delivery Method): nasal cannula     I&O's Summary      GENERAL:  [X ] No acute distress [ ]Lethargic  [ ]Unarousable  [ ]Verbal  [ ]Non-Verbal [ ]Cachexia    BEHAVIORAL/PSYCH:  [ ]Alert and Oriented x  [ ] Anxiety [ ] Delirium [ ] Agitation [X ] Calm   EYES: [ ] No scleral icterus [ ] Scleral icterus [ ] Closed  ENMT:  [ ]Dry mouth  [ ]No external oral lesions [ X] No external ear or nose lesions  CARDIOVASCULAR:  [ ]Regular [ ]Irregular [ ]Tachy [ ]Not Tachy  [ ]Sergio [ ] Edema [ ] No edema  PULMONARY:  [ ]Tachypnea  [ ]Audible excessive secretions [X ] No labored breathing [ ] labored breathing  GASTROINTESTINAL: [ ]Soft  [ ]Distended  [ X]Not distended [ ]Non tender [ ]Tender  MUSCULOSKELETAL: [ ]No clubbing [ ] clubbing  [ X] No cyanosis [ ] cyanosis  NEUROLOGIC: [ ]No focal deficits  [ ]Follows commands  [ ]Does not follow commands  [ ]Cognitive impairment  [ ]Dysphagia  [ ]Dysarthria  [ ]Paresis   SKIN: [ ] Jaundiced [X ] Non-jaundiced [ ]Rash [ ]No Rash [ ] Warm [ ] Dry  MISC/LINES: [ ] ET tube [ ] Trach [ ]NGT/OGT [ ]PEG [ ]Sauer    CRITICAL CARE:  [ ] Shock Present  [ ]Septic [ ]Cardiogenic [ ]Neurologic [ ]Hypovolemic  [ ]  Vasopressors [ ]  Inotropes   [ ]Respiratory failure present [ ]Mechanical ventilation [ ]Non-invasive ventilatory support [ ]High flow  [ ]Acute  [ ]Chronic [ ]Hypoxic  [ ]Hypercarbic [ ]Other  [ ]Other organ failure     LABS: reviewed by me, notable for: CBC WNL, elevated SCr, UA WNL                        10.5   9.20  )-----------( 144      ( 24 Apr 2025 08:43 )             32.9   04-24    143  |  103  |  31[H]  ----------------------------<  86  3.5   |  27  |  2.0[H]    Ca    8.1[L]      24 Apr 2025 08:43  Mg     2.1     04-23    TPro  7.2  /  Alb  3.4[L]  /  TBili  1.5[H]  /  DBili  x   /  AST  46[H]  /  ALT  46[H]  /  AlkPhos  107  04-23  PT/INR - ( 23 Apr 2025 16:03 )   PT: 17.20 sec;   INR: 1.45 ratio         PTT - ( 23 Apr 2025 16:03 )  PTT:38.6 sec    Urinalysis Basic - ( 24 Apr 2025 08:43 )    Color: x / Appearance: x / SG: x / pH: x  Gluc: 86 mg/dL / Ketone: x  / Bili: x / Urobili: x   Blood: x / Protein: x / Nitrite: x   Leuk Esterase: x / RBC: x / WBC x   Sq Epi: x / Non Sq Epi: x / Bacteria: x      RADIOLOGY & ADDITIONAL STUDIES: CXR personally reviewed by me:   CT chest: no infiltrates noted    PROTEIN CALORIE MALNUTRITION PRESENT: [ ]mild [ ]moderate [ ]severe [ ]underweight [ ]morbid obesity  https://www.andeal.org/vault/7387/web/files/ONC/Table_Clinical%20Characteristics%20to%20Document%20Malnutrition-White%20JV%20et%20al%202012.pdf    Height (cm): 167.6 (04-23-25 @ 04:56)  Weight (kg): 68 (04-23-25 @ 04:56), 80 (12-27-24 @ 04:45), 79.4 (11-09-24 @ 19:32)  BMI (kg/m2): 24.2 (04-23-25 @ 04:56)    [ ]PPSV2 < or = to 30% [ ]significant weight loss  [ ]poor nutritional intake  [ ]anasarca      [ ]Artificial Nutrition          Palliative Care Spiritual/Emotional Screening Tool Question  Severity (0-4):                    OR                    [X ] Unable to determine/NA  Score of 2 or greater indicates recommendation of Chaplaincy referral  Chaplaincy Referral: [ ] Yes [ ] Refused [ ] Following     Caregiver [place]:  [ ] Yes [ ] No    OR    [x ] Unable to determine. Will assess at later time if appropriate.  Social Work Referral [ ]  Patient and Family Centered Care Referral [ ]    Anticipatory Grief Present: [ ] Yes [ ] No    OR     [ x] Unable to determine. Will assess at later time if appropriate.  Social Work Referral [ ]  Patient and Family Centered Care Referral [ ]    REFERRALS:   [ ]Chaplaincy  [ ]Hospice  [ ]Child Life  [ ]Social Work  [ ]Case management [ ]Holistic Therapy     Palliative care education provided to patient and/or family    Goals of Care Document:     ______________  Ti Cooper MD  Palliative Medicine  [facility]   of Geriatric and Palliative Medicine  [phone]   HPI: 84M with PMH of HTN, HLD, GERD, anemia, TBI, splenectomy, skin cancer, HCV, former smoker, HFrEF, pulmonary HTN, orthostatic hypotension, CVA here after fall at home, and found to have a R hip fracture, pending orthopedic intervention. Patient has chronic HFrEF, on diuretics, and CKD3 (baseline SCr around 1.8). Palliative consulted for GOC.    PERTINENT PM/SXH:   Hypercholesteremia    Gastroesophageal reflux disease, esophagitis presence not specified    Other depression    Hepatitis C virus infection cured after antiviral drug therapy    Skin cancer    Benign prostate hyperplasia    Spleen injury    Depression    CVA (cerebral vascular accident)    Anemia    HTN (hypertension)    Acute systolic congestive heart failure    H/O orthostatic hypotension    Tricuspid regurgitation    H/O pulmonary hypertension    Mitral regurgitation    Aortic stenosis    Compression fracture of vertebra      H/O spleen injury    History of skin cancer      FAMILY HISTORY:  Family history of breast cancer in female (Mother)    Family history of throat cancer (Father)    no pertinent family history      SOCIAL HISTORY:   Significant other/partner[X ]  Children[ ]  Buddhism/Spirituality:  Substance hx:  [ ]   Tobacco hx:  [ ]   Alcohol hx: [ ]   Living Situation: [ ]Home  [ ]Long term care  [ ]Rehab [ ]Other  Home Services: [ ] HHA [ ] Visting RN [ ] Hospice  Occupation:  Home Opioid hx:  [ ] Y [ ] N [ ] I-Stop Reference No: [id number]    ADVANCE DIRECTIVES:    [ ] Full Code [X ] DNR  MOLST  [ ]  Living Will  [ ]   DECISION MAKER(s):  [ ] Health Care Proxy(s)  [ ] Surrogate(s)  [ ] Guardian           Name(s): Phone Number(s): wife    BASELINE (I)ADL(s) (prior to admission):  [place]: [ ]Total  [ ] Moderate [ ]Dependent  Palliative Performance Status Version 2:         %    http://npcrc.org/files/news/palliative_performance_scale_ppsv2.pdf    Allergies    No Known Allergies    Intolerances    MEDICATIONS  (STANDING):  aspirin enteric coated 81 milliGRAM(s) Oral daily  atorvastatin 80 milliGRAM(s) Oral at bedtime  chlorhexidine 4% Liquid 1 Application(s) Topical <User Schedule>  colchicine 0.6 milliGRAM(s) Oral daily  escitalopram 20 milliGRAM(s) Oral daily  fludroCORTISONE 0.1 milliGRAM(s) Oral daily  furosemide   Injectable 40 milliGRAM(s) IV Push two times a day  heparin   Injectable 5000 Unit(s) SubCutaneous every 8 hours  mirtazapine 15 milliGRAM(s) Oral at bedtime  pantoprazole    Tablet 40 milliGRAM(s) Oral before breakfast  spironolactone 25 milliGRAM(s) Oral daily  tamsulosin 0.4 milliGRAM(s) Oral at bedtime    MEDICATIONS  (PRN):  acetaminophen     Tablet .. 650 milliGRAM(s) Oral every 6 hours PRN Temp greater or equal to 38C (100.4F), Mild Pain (1 - 3)  aluminum hydroxide/magnesium hydroxide/simethicone Suspension 30 milliLiter(s) Oral every 4 hours PRN Dyspepsia  melatonin 3 milliGRAM(s) Oral at bedtime PRN Insomnia  morphine  - Injectable 2 milliGRAM(s) IV Push every 4 hours PRN Severe Pain (7 - 10)  ondansetron Injectable 4 milliGRAM(s) IV Push every 8 hours PRN Nausea and/or Vomiting  traMADol 25 milliGRAM(s) Oral every 4 hours PRN Moderate Pain (4 - 6)      PRESENT SYMPTOMS: [ ]Unable to obtain due to poor mentation   Source if other than patient:  [ ]Family   [ ]Team   [ X]All review of systems negative including pain and dyspnea unless noted below    All components of pain assessment below addressed. Blank spaces indicate that the patient did/could not complete the assessment.  Pain: [ ]yes [X ]no  QOL impact -   Location -                    Aggravating factors -  Quality -  Radiation -  Timing-  Severity (0-10 scale):  Minimal acceptable level (0-10 scale):     CPOT:    https://www.[facility].org/getattachment/[email or web address]/Critical-Care-Pain-Observation-Tool-(CPOT)    PAIN AD Score:   http://geriatrictoolkit.[place].[facility]/cog/painad.pdf (press ctrl +  left click to view)    Dyspnea:                           [ ]None[ ]Mild [ ]Moderate [ ]Severe     Respiratory Distress Observation Scale (RDOS):   A score of 0 to 2 signifies little or no respiratory distress, 3 signifies mild distress, scores 4 to 6 indicate moderate distress, and scores greater than 7 signify severe distress  [email or web address]/sites/default/files/PDFS/[email or web address]-ydxtqcqqhgk-[email or web address]-[email or web address].pdf    Anxiety:                             [ ]None[ ]Mild [ ]Moderate [ ]Severe   Fatigue:                             [ ]None[ ]Mild [ ]Moderate [ ]Severe   Nausea:                             [ ]None[ ]Mild [ ]Moderate [ ]Severe   Loss of appetite:              [ ]None[ ]Mild [ ]Moderate [ ]Severe   Constipation:                    [ ]None[ ]Mild [ ]Moderate [ ]Severe    Other Symptoms:    PHYSICAL EXAM:  Vital Signs Last 24 Hrs  T(C): 36.6 (24 Apr 2025 05:39), Max: 36.8 (23 Apr 2025 21:14)  T(F): 97.9 (24 Apr 2025 05:39), Max: 98.3 (23 Apr 2025 21:14)  HR: 50 (24 Apr 2025 05:39) (50 - 84)  BP: 113/72 (24 Apr 2025 05:39) (113/72 - 138/79)  BP(mean): 97 (23 Apr 2025 12:15) (97 - 97)  RR: 16 (24 Apr 2025 05:39) (16 - 20)  SpO2: 92% (24 Apr 2025 05:39) (92% - 96%)    Parameters below as of 23 Apr 2025 21:14  Patient On (Oxygen Delivery Method): nasal cannula     I&O's Summary      GENERAL:  [X ] No acute distress [ ]Lethargic  [ ]Unarousable  [ ]Verbal  [ ]Non-Verbal [ ]Cachexia    BEHAVIORAL/PSYCH:  [ ]Alert and Oriented x  [ ] Anxiety [ ] Delirium [ ] Agitation [X ] Calm   EYES: [ ] No scleral icterus [ ] Scleral icterus [ ] Closed  ENMT:  [ ]Dry mouth  [ ]No external oral lesions [ X] No external ear or nose lesions  CARDIOVASCULAR:  [ ]Regular [ ]Irregular [ ]Tachy [ ]Not Tachy  [ ]Sergio [ ] Edema [ ] No edema  PULMONARY:  [ ]Tachypnea  [ ]Audible excessive secretions [X ] No labored breathing [ ] labored breathing  GASTROINTESTINAL: [ ]Soft  [ ]Distended  [ X]Not distended [ ]Non tender [ ]Tender  MUSCULOSKELETAL: [ ]No clubbing [ ] clubbing  [ X] No cyanosis [ ] cyanosis  NEUROLOGIC: [ ]No focal deficits  [ X]Follows commands  [ ]Does not follow commands  [ ]Cognitive impairment  [ ]Dysphagia  [ ]Dysarthria  [ ]Paresis   SKIN: [ ] Jaundiced [X ] Non-jaundiced [ ]Rash [ ]No Rash [ ] Warm [ ] Dry  MISC/LINES: [ ] ET tube [ ] Trach [ ]NGT/OGT [ ]PEG [ ]Sauer    CRITICAL CARE:  [ ] Shock Present  [ ]Septic [ ]Cardiogenic [ ]Neurologic [ ]Hypovolemic  [ ]  Vasopressors [ ]  Inotropes   [ ]Respiratory failure present [ ]Mechanical ventilation [ ]Non-invasive ventilatory support [ ]High flow  [ ]Acute  [ ]Chronic [ ]Hypoxic  [ ]Hypercarbic [ ]Other  [ ]Other organ failure     LABS: reviewed by me, notable for: CBC WNL, elevated SCr, UA WNL                        10.5   9.20  )-----------( 144      ( 24 Apr 2025 08:43 )             32.9   04-24    143  |  103  |  31[H]  ----------------------------<  86  3.5   |  27  |  2.0[H]    Ca    8.1[L]      24 Apr 2025 08:43  Mg     2.1     04-23    TPro  7.2  /  Alb  3.4[L]  /  TBili  1.5[H]  /  DBili  x   /  AST  46[H]  /  ALT  46[H]  /  AlkPhos  107  04-23  PT/INR - ( 23 Apr 2025 16:03 )   PT: 17.20 sec;   INR: 1.45 ratio         PTT - ( 23 Apr 2025 16:03 )  PTT:38.6 sec    Urinalysis Basic - ( 24 Apr 2025 08:43 )    Color: x / Appearance: x / SG: x / pH: x  Gluc: 86 mg/dL / Ketone: x  / Bili: x / Urobili: x   Blood: x / Protein: x / Nitrite: x   Leuk Esterase: x / RBC: x / WBC x   Sq Epi: x / Non Sq Epi: x / Bacteria: x      RADIOLOGY & ADDITIONAL STUDIES: CXR personally reviewed by me:   CT chest: no infiltrates noted    PROTEIN CALORIE MALNUTRITION PRESENT: [ ]mild [ ]moderate [ ]severe [ ]underweight [ ]morbid obesity  https://www.andeal.org/vault/6228/web/files/ONC/Table_Clinical%20Characteristics%20to%20Document%20Malnutrition-White%20JV%20et%20al%202012.pdf    Height (cm): 167.6 (04-23-25 @ 04:56)  Weight (kg): 68 (04-23-25 @ 04:56), 80 (12-27-24 @ 04:45), 79.4 (11-09-24 @ 19:32)  BMI (kg/m2): 24.2 (04-23-25 @ 04:56)    [ ]PPSV2 < or = to 30% [ ]significant weight loss  [ ]poor nutritional intake  [ ]anasarca      [ ]Artificial Nutrition          Palliative Care Spiritual/Emotional Screening Tool Question  Severity (0-4):                    OR                    [X ] Unable to determine/NA  Score of 2 or greater indicates recommendation of Chaplaincy referral  Chaplaincy Referral: [ ] Yes [ ] Refused [ ] Following     Caregiver [place]:  [ ] Yes [ ] No    OR    [x ] Unable to determine. Will assess at later time if appropriate.  Social Work Referral [ ]  Patient and Family Centered Care Referral [ ]    Anticipatory Grief Present: [ ] Yes [ ] No    OR     [ x] Unable to determine. Will assess at later time if appropriate.  Social Work Referral [ ]  Patient and Family Centered Care Referral [ ]    REFERRALS:   [ ]Chaplaincy  [ ]Hospice  [ ]Child Life  [ ]Social Work  [ ]Case management [ ]Holistic Therapy     Palliative care education provided to patient and/or family    Goals of Care Document:     ______________  Ti Cooper MD  Palliative Medicine  [facility]   of Geriatric and Palliative Medicine  [phone]

## 2025-04-25 DIAGNOSIS — S72.002A FRACTURE OF UNSPECIFIED PART OF NECK OF LEFT FEMUR, INITIAL ENCOUNTER FOR CLOSED FRACTURE: ICD-10-CM

## 2025-04-25 LAB
ALBUMIN SERPL ELPH-MCNC: 3 G/DL — LOW (ref 3.5–5.2)
ALP SERPL-CCNC: 98 U/L — SIGNIFICANT CHANGE UP (ref 30–115)
ALT FLD-CCNC: 31 U/L — SIGNIFICANT CHANGE UP (ref 0–41)
ANION GAP SERPL CALC-SCNC: 11 MMOL/L — SIGNIFICANT CHANGE UP (ref 7–14)
APTT BLD: 37.4 SEC — SIGNIFICANT CHANGE UP (ref 27–39.2)
AST SERPL-CCNC: 24 U/L — SIGNIFICANT CHANGE UP (ref 0–41)
BILIRUB SERPL-MCNC: 1.4 MG/DL — HIGH (ref 0.2–1.2)
BUN SERPL-MCNC: 34 MG/DL — HIGH (ref 10–20)
CALCIUM SERPL-MCNC: 8.1 MG/DL — LOW (ref 8.4–10.5)
CHLORIDE SERPL-SCNC: 102 MMOL/L — SIGNIFICANT CHANGE UP (ref 98–110)
CO2 SERPL-SCNC: 29 MMOL/L — SIGNIFICANT CHANGE UP (ref 17–32)
CREAT SERPL-MCNC: 2.2 MG/DL — HIGH (ref 0.7–1.5)
EGFR: 29 ML/MIN/1.73M2 — LOW
EGFR: 29 ML/MIN/1.73M2 — LOW
GLUCOSE SERPL-MCNC: 75 MG/DL — SIGNIFICANT CHANGE UP (ref 70–99)
HCT VFR BLD CALC: 34.1 % — LOW (ref 42–52)
HGB BLD-MCNC: 10.9 G/DL — LOW (ref 14–18)
INR BLD: 1.42 RATIO — HIGH (ref 0.65–1.3)
MCHC RBC-ENTMCNC: 32 G/DL — SIGNIFICANT CHANGE UP (ref 32–37)
MCHC RBC-ENTMCNC: 32.8 PG — HIGH (ref 27–31)
MCV RBC AUTO: 102.7 FL — HIGH (ref 80–94)
NRBC BLD AUTO-RTO: 0 /100 WBCS — SIGNIFICANT CHANGE UP (ref 0–0)
PLATELET # BLD AUTO: 139 K/UL — SIGNIFICANT CHANGE UP (ref 130–400)
PMV BLD: 14.6 FL — HIGH (ref 7.4–10.4)
POTASSIUM SERPL-MCNC: 3.9 MMOL/L — SIGNIFICANT CHANGE UP (ref 3.5–5)
POTASSIUM SERPL-SCNC: 3.9 MMOL/L — SIGNIFICANT CHANGE UP (ref 3.5–5)
PROT SERPL-MCNC: 6.4 G/DL — SIGNIFICANT CHANGE UP (ref 6–8)
PROTHROM AB SERPL-ACNC: 16.9 SEC — HIGH (ref 9.95–12.87)
RBC # BLD: 3.32 M/UL — LOW (ref 4.7–6.1)
RBC # FLD: 17.4 % — HIGH (ref 11.5–14.5)
SODIUM SERPL-SCNC: 142 MMOL/L — SIGNIFICANT CHANGE UP (ref 135–146)
WBC # BLD: 7.26 K/UL — SIGNIFICANT CHANGE UP (ref 4.8–10.8)
WBC # FLD AUTO: 7.26 K/UL — SIGNIFICANT CHANGE UP (ref 4.8–10.8)

## 2025-04-25 PROCEDURE — 99233 SBSQ HOSP IP/OBS HIGH 50: CPT

## 2025-04-25 PROCEDURE — 27235 TREAT THIGH FRACTURE: CPT | Mod: LT

## 2025-04-25 PROCEDURE — 71045 X-RAY EXAM CHEST 1 VIEW: CPT | Mod: 26

## 2025-04-25 RX ORDER — ACETAMINOPHEN 500 MG/5ML
650 LIQUID (ML) ORAL EVERY 6 HOURS
Refills: 0 | Status: DISCONTINUED | OUTPATIENT
Start: 2025-04-25 | End: 2025-04-28

## 2025-04-25 RX ORDER — FUROSEMIDE 10 MG/ML
40 INJECTION INTRAMUSCULAR; INTRAVENOUS
Refills: 0 | Status: COMPLETED | OUTPATIENT
Start: 2025-04-25 | End: 2025-04-28

## 2025-04-25 RX ORDER — SPIRONOLACTONE 25 MG
25 TABLET ORAL DAILY
Refills: 0 | Status: DISCONTINUED | OUTPATIENT
Start: 2025-04-25 | End: 2025-04-28

## 2025-04-25 RX ORDER — HYDROMORPHONE/SOD CHLOR,ISO/PF 2 MG/10 ML
0.5 SYRINGE (ML) INJECTION
Refills: 0 | Status: DISCONTINUED | OUTPATIENT
Start: 2025-04-25 | End: 2025-04-25

## 2025-04-25 RX ORDER — ATORVASTATIN CALCIUM 80 MG/1
80 TABLET, FILM COATED ORAL AT BEDTIME
Refills: 0 | Status: DISCONTINUED | OUTPATIENT
Start: 2025-04-25 | End: 2025-04-28

## 2025-04-25 RX ORDER — ONDANSETRON HCL/PF 4 MG/2 ML
4 VIAL (ML) INJECTION EVERY 8 HOURS
Refills: 0 | Status: DISCONTINUED | OUTPATIENT
Start: 2025-04-25 | End: 2025-04-28

## 2025-04-25 RX ORDER — TORSEMIDE 10 MG
20 TABLET ORAL DAILY
Refills: 0 | Status: DISCONTINUED | OUTPATIENT
Start: 2025-04-25 | End: 2025-04-28

## 2025-04-25 RX ORDER — MIRTAZAPINE 30 MG/1
15 TABLET, FILM COATED ORAL AT BEDTIME
Refills: 0 | Status: DISCONTINUED | OUTPATIENT
Start: 2025-04-25 | End: 2025-04-28

## 2025-04-25 RX ORDER — SODIUM CHLORIDE 9 G/1000ML
1000 INJECTION, SOLUTION INTRAVENOUS
Refills: 0 | Status: DISCONTINUED | OUTPATIENT
Start: 2025-04-25 | End: 2025-04-25

## 2025-04-25 RX ORDER — MELATONIN 5 MG
3 TABLET ORAL AT BEDTIME
Refills: 0 | Status: DISCONTINUED | OUTPATIENT
Start: 2025-04-25 | End: 2025-04-28

## 2025-04-25 RX ORDER — CEFAZOLIN SODIUM IN 0.9 % NACL 3 G/100 ML
2000 INTRAVENOUS SOLUTION, PIGGYBACK (ML) INTRAVENOUS EVERY 8 HOURS
Refills: 0 | Status: COMPLETED | OUTPATIENT
Start: 2025-04-25 | End: 2025-04-26

## 2025-04-25 RX ORDER — FLUDROCORTISONE ACETATE 0.1 MG
0.1 TABLET ORAL DAILY
Refills: 0 | Status: DISCONTINUED | OUTPATIENT
Start: 2025-04-25 | End: 2025-04-28

## 2025-04-25 RX ORDER — ONDANSETRON HCL/PF 4 MG/2 ML
4 VIAL (ML) INJECTION ONCE
Refills: 0 | Status: DISCONTINUED | OUTPATIENT
Start: 2025-04-25 | End: 2025-04-25

## 2025-04-25 RX ORDER — HYDROMORPHONE/SOD CHLOR,ISO/PF 2 MG/10 ML
1 SYRINGE (ML) INJECTION
Refills: 0 | Status: DISCONTINUED | OUTPATIENT
Start: 2025-04-25 | End: 2025-04-25

## 2025-04-25 RX ORDER — HYDROMORPHONE/SOD CHLOR,ISO/PF 2 MG/10 ML
0.2 SYRINGE (ML) INJECTION EVERY 4 HOURS
Refills: 0 | Status: DISCONTINUED | OUTPATIENT
Start: 2025-04-25 | End: 2025-04-28

## 2025-04-25 RX ORDER — ASPIRIN 325 MG
81 TABLET ORAL DAILY
Refills: 0 | Status: DISCONTINUED | OUTPATIENT
Start: 2025-04-25 | End: 2025-04-28

## 2025-04-25 RX ORDER — HEPARIN SODIUM 1000 [USP'U]/ML
5000 INJECTION INTRAVENOUS; SUBCUTANEOUS EVERY 8 HOURS
Refills: 0 | Status: DISCONTINUED | OUTPATIENT
Start: 2025-04-25 | End: 2025-04-28

## 2025-04-25 RX ORDER — COLCHICINE 0.6 MG/1
0.6 TABLET, FILM COATED ORAL DAILY
Refills: 0 | Status: DISCONTINUED | OUTPATIENT
Start: 2025-04-25 | End: 2025-04-28

## 2025-04-25 RX ORDER — ESCITALOPRAM OXALATE 20 MG/1
20 TABLET ORAL DAILY
Refills: 0 | Status: DISCONTINUED | OUTPATIENT
Start: 2025-04-25 | End: 2025-04-28

## 2025-04-25 RX ORDER — TAMSULOSIN HYDROCHLORIDE 0.4 MG/1
0.4 CAPSULE ORAL AT BEDTIME
Refills: 0 | Status: DISCONTINUED | OUTPATIENT
Start: 2025-04-25 | End: 2025-04-28

## 2025-04-25 RX ORDER — OXYCODONE HYDROCHLORIDE 30 MG/1
5 TABLET ORAL ONCE
Refills: 0 | Status: DISCONTINUED | OUTPATIENT
Start: 2025-04-25 | End: 2025-04-25

## 2025-04-25 RX ORDER — TRAMADOL HYDROCHLORIDE 50 MG/1
25 TABLET, FILM COATED ORAL EVERY 4 HOURS
Refills: 0 | Status: DISCONTINUED | OUTPATIENT
Start: 2025-04-25 | End: 2025-04-28

## 2025-04-25 RX ORDER — MAGNESIUM, ALUMINUM HYDROXIDE 200-200 MG
30 TABLET,CHEWABLE ORAL EVERY 4 HOURS
Refills: 0 | Status: DISCONTINUED | OUTPATIENT
Start: 2025-04-25 | End: 2025-04-28

## 2025-04-25 RX ADMIN — Medication 100 MILLIGRAM(S): at 21:17

## 2025-04-25 RX ADMIN — MIRTAZAPINE 15 MILLIGRAM(S): 30 TABLET, FILM COATED ORAL at 21:27

## 2025-04-25 RX ADMIN — HEPARIN SODIUM 5000 UNIT(S): 1000 INJECTION INTRAVENOUS; SUBCUTANEOUS at 21:17

## 2025-04-25 RX ADMIN — FUROSEMIDE 40 MILLIGRAM(S): 10 INJECTION INTRAMUSCULAR; INTRAVENOUS at 05:03

## 2025-04-25 RX ADMIN — HEPARIN SODIUM 5000 UNIT(S): 1000 INJECTION INTRAVENOUS; SUBCUTANEOUS at 05:03

## 2025-04-25 RX ADMIN — ATORVASTATIN CALCIUM 80 MILLIGRAM(S): 80 TABLET, FILM COATED ORAL at 21:17

## 2025-04-25 RX ADMIN — Medication 100 MILLIGRAM(S): at 14:22

## 2025-04-25 RX ADMIN — TAMSULOSIN HYDROCHLORIDE 0.4 MILLIGRAM(S): 0.4 CAPSULE ORAL at 21:22

## 2025-04-25 NOTE — PHYSICAL THERAPY INITIAL EVALUATION ADULT - GENERAL OBSERVATIONS, REHAB EVAL
18:45-19:10. Chart reviewed; confirmed with RN to see the pt for PT. Pt ready for PT; received in bed with no complain of pain and in NAD. +hep lock, +O2 via NC at 3L. Agreeable for PT evaluation.

## 2025-04-25 NOTE — PROGRESS NOTE ADULT - SUBJECTIVE AND OBJECTIVE BOX
HPI: 84M with PMH of HTN, HLD, GERD, anemia, TBI, splenectomy, skin cancer, HCV, former smoker, HFrEF, pulmonary HTN, orthostatic hypotension, CVA here after fall at home, and found to have a R hip fracture, pending orthopedic intervention. Patient has chronic HFrEF, on diuretics, and CKD3 (baseline SCr around 1.8). Palliative consulted for [facility].    INTERVAL EVENTS  4/25: patient comfortable appearing, resting    ADVANCE DIRECTIVES:    [ ] Full Code [X ] DNR  MOLST  [ ]  Living Will  [ ]   DECISION MAKER(s):  [ ] Health Care Proxy(s)  [ ] Surrogate(s)  [ ] Guardian           Name(s): Phone Number(s): wife    BASELINE (I)ADL(s) (prior to admission):  [place]: [ ]Total  [ ] Moderate [ ]Dependent  Palliative Performance Status Version 2:         %    http://[facility].org/files/news/palliative_performance_scale_ppsv2.pdf    Allergies    No Known Allergies    Intolerances    MEDICATIONS  (STANDING):    MEDICATIONS  (PRN):        PRESENT SYMPTOMS: [ X]Unable to obtain due to patient resting  Source if other than patient:  [ ]Family   [ ]Team   [ X]All review of systems negative including pain and dyspnea unless noted below    All components of pain assessment below addressed. Blank spaces indicate that the patient did/could not complete the assessment.  Pain: [ ]yes [ ]no  QOL impact -   Location -                    Aggravating factors -  Quality -  Radiation -  Timing-  Severity (0-10 scale):  Minimal acceptable level (0-10 scale):     CPOT:    https://www.[facility].org/getattachment/[email or web address]/Critical-Care-Pain-Observation-Tool-(CPOT)    PAIN AD Score: 0  [email or web address].[place].[facility]/cog/painad.pdf (press ctrl +  left click to view)    Dyspnea:                           [ ]None[ ]Mild [ ]Moderate [ ]Severe     Respiratory Distress Observation Scale (RDOS): 0  A score of 0 to 2 signifies little or no respiratory distress, 3 signifies mild distress, scores 4 to 6 indicate moderate distress, and scores greater than 7 signify severe distress  https://www.[facility].ca/sites/default/files/PDFS/576520-cnhiyyfrjhv-obafqwlt-zogltoluswl-nezcy.pdf    Anxiety:                             [ ]None[ ]Mild [ ]Moderate [ ]Severe   Fatigue:                             [ ]None[ ]Mild [ ]Moderate [ ]Severe   Nausea:                             [ ]None[ ]Mild [ ]Moderate [ ]Severe   Loss of appetite:              [ ]None[ ]Mild [ ]Moderate [ ]Severe   Constipation:                    [ ]None[ ]Mild [ ]Moderate [ ]Severe    Other Symptoms:    PHYSICAL EXAM:  Vital Signs Last 24 Hrs  T(C): 36.4 (25 Apr 2025 08:29), Max: 36.9 (24 Apr 2025 21:13)  T(F): 97.6 (25 Apr 2025 08:29), Max: 98.5 (24 Apr 2025 21:13)  HR: 78 (25 Apr 2025 08:29) (63 - 94)  BP: 126/85 (25 Apr 2025 08:29) (114/78 - 126/85)  BP(mean): --  RR: 16 (25 Apr 2025 08:29) (16 - 17)  SpO2: 94% (25 Apr 2025 08:29) (91% - 96%)    Parameters below as of 24 Apr 2025 13:48  Patient On (Oxygen Delivery Method): nasal cannula  O2 Flow (L/min): 3      GENERAL:  [X ] No acute distress [ ]Lethargic  [ ]Unarousable  [ ]Verbal  [ ]Non-Verbal [ ]Cachexia    BEHAVIORAL/PSYCH:  [ ]Alert and Oriented x  [ ] Anxiety [ ] Delirium [ ] Agitation [X ] Calm   EYES: [ ] No scleral icterus [ ] Scleral icterus [ ] Closed  ENMT:  [ ]Dry mouth  [ ]No external oral lesions [ X] No external ear or nose lesions  CARDIOVASCULAR:  [ ]Regular [ ]Irregular [ ]Tachy [ ]Not Tachy  [ ]Sergio [ ] Edema [ ] No edema  PULMONARY:  [ ]Tachypnea  [ ]Audible excessive secretions [X ] No labored breathing [ ] labored breathing  GASTROINTESTINAL: [ ]Soft  [ ]Distended  [ X]Not distended [ ]Non tender [ ]Tender  MUSCULOSKELETAL: [ ]No clubbing [ ] clubbing  [ X] No cyanosis [ ] cyanosis  NEUROLOGIC: [ ]No focal deficits  [ X]Follows commands  [ ]Does not follow commands  [ ]Cognitive impairment  [ ]Dysphagia  [ ]Dysarthria  [ ]Paresis   SKIN: [ ] Jaundiced [X ] Non-jaundiced [ ]Rash [ ]No Rash [ ] Warm [ ] Dry  MISC/LINES: [ ] ET tube [ ] Trach [ ]NGT/OGT [ ]PEG [ ]Sauer    CRITICAL CARE:  [ ] Shock Present  [ ]Septic [ ]Cardiogenic [ ]Neurologic [ ]Hypovolemic  [ ]  Vasopressors [ ]  Inotropes   [ ]Respiratory failure present [ ]Mechanical ventilation [ ]Non-invasive ventilatory support [ ]High flow  [ ]Acute  [ ]Chronic [ ]Hypoxic  [ ]Hypercarbic [ ]Other  [ ]Other organ failure     LABS: reviewed by me, notable for: CBC WNL, elevated SCr, UA WNL                                   10.9   7.26  )-----------( 139      ( 25 Apr 2025 05:33 )             34.1     04-25    142  |  102  |  34[H]  ----------------------------<  75  3.9   |  29  |  2.2[H]    Ca    8.1[L]      25 Apr 2025 05:33          RADIOLOGY & ADDITIONAL STUDIES: CXR personally reviewed by me:   CT chest: no infiltrates noted  EKG 4/23: HR ~100bpm    PROTEIN CALORIE MALNUTRITION PRESENT: [ ]mild [ ]moderate [ ]severe [ ]underweight [ ]morbid obesity  https://www.andeal.org/vault/2440/web/files/ONC/Table_Clinical%20Characteristics%20to%20Document%20Malnutrition-White%20JV%20et%20al%202012.pdf    Height (cm): 167.6 (04-23-25 @ 04:56)  Weight (kg): 68 (04-23-25 @ 04:56), 80 (12-27-24 @ 04:45), 79.4 (11-09-24 @ 19:32)  BMI (kg/m2): 24.2 (04-23-25 @ 04:56)    [ ]PPSV2 < or = to 30% [ ]significant weight loss  [ ]poor nutritional intake  [ ]anasarca      [ ]Artificial Nutrition          Palliative Care Spiritual/Emotional Screening Tool Question  Severity (0-4):                    OR                    [X ] Unable to determine/NA  Score of 2 or greater indicates recommendation of Chaplaincy referral  Chaplaincy Referral: [ ] Yes [ ] Refused [ ] Following     Caregiver [place]:  [ ] Yes [ ] No    OR    [x ] Unable to determine. Will assess at later time if appropriate.  Social Work Referral [ ]  Patient and Family Centered Care Referral [ ]    Anticipatory Grief Present: [ ] Yes [ ] No    OR     [ x] Unable to determine. Will assess at later time if appropriate.  Social Work Referral [ ]  Patient and Family Centered Care Referral [ ]    REFERRALS:   [ ]Chaplaincy  [ ]Hospice  [ ]Child Life  [ ]Social Work  [ ]Case management [ ]Holistic Therapy     Palliative care education provided to patient and/or family    Goals of Care Document:     ______________  Ti Cooper MD  Palliative Medicine  [facility]   of Geriatric and Palliative Medicine  [phone]

## 2025-04-25 NOTE — PROGRESS NOTE ADULT - ASSESSMENT
84M with PMH of HTN, HLD, GERD, anemia, TBI, splenectomy, skin cancer, HCV, former smoker, HFrEF, pulmonary HTN, orthostatic hypotension, CVA here after fall at home, and found to have a R hip fracture, pending orthopedic intervention. Patient has chronic HFrEF, on diuretics, and CKD3 (baseline SCr around 1.8). Palliative consulted for [facility].    - OR today  - c/w dilaudid 0.2mg IV Q4 PRN mod-severe pain (avoid morphine given CKD3) - may need higher dose post-op, [phone] if needed for the weekend  - patient is not aware of his diagnosis of HFrEF, EF 25-30% on recent TTE, will need further discussion with primary doctor  - DNR/DNI, would re-instate after OR  - will follow    ______________  Ti Cooper MD  Palliative Medicine  [facility]   of Geriatric and Palliative Medicine  [phone]

## 2025-04-25 NOTE — PHYSICAL THERAPY INITIAL EVALUATION ADULT - LIVES WITH, PROFILE
in a private home with 6 steps to enter with bilateral handrails and stays on the 1st floor inside with no additional steps./spouse

## 2025-04-25 NOTE — PHYSICAL THERAPY INITIAL EVALUATION ADULT - PERTINENT HX OF CURRENT PROBLEM, REHAB EVAL
Pt is a 85yo M with a pmhx of HTN, Hyperlipidemia, GERD, anemia, TBI, splenectomy, skin CA s/p excision and radiation, Hep C (treated), ex-smoker quit 50 years ago, systolic CHF, MR/TR/AS, pulmonary htn, orthostatic hypotension, thoracic compression fracture, and CVA whom was BIBA to Ed after a fall at home.  Patient states that he was standing from chair and walker rolled forward and he fell landing on left side. Patient denies head injury no loss of consciousness. Patient states that since having pain in left hip and couldn't stand. Patient denies any chest pain or sob.   Pt with dx of CRPP of L hip under general anesthesia seen pod #0.

## 2025-04-25 NOTE — BRIEF OPERATIVE NOTE - NSICDXBRIEFPROCEDURE_GEN_ALL_CORE_FT
PROCEDURES:  Closed reduction and percutaneous pinning of left hip 25-Apr-2025 12:27:28  Susan Ortega

## 2025-04-25 NOTE — PROGRESS NOTE ADULT - ASSESSMENT
Pt is a 85yo M with a pmhx of HTN, Hyperlipidemia, GERD, anemia, TBI, splenectomy, skin CA s/p excision and radiation, Hep C (treated), ex-smoker quit 50 years ago, systolic CHF, MR/TR/AS, pulmonary htn, orthostatic hypotension, thoracic compression fracture, and CVA whom was BIBA to Ed after a fall at home.     #  Left hip fracture  -bedrest/NWB LLE  -ortho consult  -npo prep for surgery today   -check T&S, coags: PTT 17.20>16.90, INR 1.45>1.42  -2 units on hold for surgery  -hold plavix  -d/w ortho team  -pain meds prn tylneol, tramadol, morphine  -dvt prophylaxis with sq heparin  -chg bath    #anemia, 2/2 ACD,   -check iron 24, tibc 252  - ferritin: WNL  - B12, folate: WNL   -monitor cbc    #Chronic systolic CHF - stable   #moderate pericardial effusion - 2/2 CKD likely   #valvular heart dz with pulmonary htn  - CXR ordered today   -daily weights, I&O  -start colchicine 0.6mg daily  -c/w torsemide and spironolactone  -give dose of lasix today  - BNP 04787, baseline 8715    #CKD3  -creatinine baseline around 1.8 as per Dr Enriquez  creatinine is 2 , about baseline   -received 500cc LR in ED  -will hold off on ivf being CHF/signs of fluid overload  -check urine sodium, creatinine 2>2.2  -monitor I&O and bmp   -r/b sono    #orthostatic hypotension  -c/w florinef    #compression fractures lumbar and thoracic  -no acute back pain  -will need TLSO brace after surgery/when ambulating    #CVA hx  -c/w lipitor and asa  -hold plavix   Pt is a 83yo M with a pmhx of HTN, Hyperlipidemia, GERD, anemia, TBI, splenectomy, skin CA s/p excision and radiation, Hep C (treated), ex-smoker quit 50 years ago, systolic CHF, MR/TR/AS, pulmonary htn, orthostatic hypotension, thoracic compression fracture, and CVA whom was BIBA to Ed after a fall at home admitted for:      #Left hip fracture  - Ortho consult appreciated   - bedrest/NWB LLE  - Remain NPO for OR today w/ ortho  - T&S and caogs done   - 2 units on hold for surgery  - hold plavix  - pain control prn w/ tylneol, tramadol, morphine  - VTE ppx w/ SQ Heparin   - CHG bath    #Chronic systolic CHF - mild exacerbation    #moderate pericardial effusion - 2/2 CKD likely   #valvular heart dz with pulmonary htn  - torsemide held   - IV Lasix 40mg IV Push BID x 3 days   - PO metolazone 10mg x 1 per Dr. Enriquez   - FU repeat CXR  - Monitor I&Os   - Daily weight  - resume colchicine 0.6mg daily  - Home torsemide held, resume aldactone     #Acute on CKD3  - Baseline Cr unknown, Cr 12/2024 0.9  - Cr 2.2, trending up  - s/p IVF - 500CC in ED   - Will hold IVF for now given CHF exacerbation   - Renal / Bladder US noted   - FU urine lytes   - Nephrology consult     #anemia, 2/2 ACD  - H/H stable   - Iron panel noted, will start on iron supplements   - B12/Folate WNL     #orthostatic hypotension  -c/w florinef    #compression fractures lumbar and thoracic  - no acute back pain  - will need TLSO brace after surgery/when ambulating    #CVA hx  - c/w Lipitor and asa  - hold Plavix given ortho trauma     Diet: NPO for OR   Activity: bedrest/NWB LLE  VTE PPX: SQ heparin   Dispo: acute, OR today, ASHLEY, neprho, fluid overload     Above discussed wPrecious Enriquez    Pt is a 85yo M with a pmhx of HTN, Hyperlipidemia, GERD, anemia, TBI, splenectomy, skin CA s/p excision and radiation, Hep C (treated), ex-smoker quit 50 years ago, systolic CHF, MR/TR/AS, pulmonary htn, orthostatic hypotension, thoracic compression fracture, and CVA whom was BIBA to Ed after a fall at home admitted for:      #Left hip fracture  - Ortho consult appreciated   for hip surgery today    #Chronic systolic CHF - mild exacerbation  but stable   #moderate pericardial effusion - 2/2 CKD likely   #valvular heart dz with pulmonary htn  - torsemide held   - IV Lasix 40mg IV Push BID x 3 days   - PO metolazone 10mg x 1 per Dr. Enriquez   - FU repeat CXR  - Monitor I&Os   - Daily weight  - resume colchicine 0.6mg daily  - Home torsemide held, resume aldactone     #Acute on CKD3  - Baseline Cr unknown, Cr 12/2024 0.9  - Cr 2.2, trending up  - s/p IVF - 500CC in ED   - Will hold IVF for now given CHF exacerbation   - Renal / Bladder US noted   - FU urine lytes   - Nephrology consult     #anemia, 2/2 ACD  - H/H stable   - Iron panel noted, will start on iron supplements   - B12/Folate WNL     #orthostatic hypotension  -c/w florinef    #compression fractures lumbar and thoracic  - no acute back pain  - will need TLSO brace after surgery/when ambulating    #CVA hx  - c/w Lipitor and asa  - hold Plavix given ortho trauma     Diet: NPO for OR   Activity: bedrest/NWB LLE  VTE PPX: SQ heparin   Dispo: acute, OR today, ASHLEY, neprho, fluid overload     Above discussed wPrecious Enriquez

## 2025-04-25 NOTE — PROGRESS NOTE ADULT - SUBJECTIVE AND OBJECTIVE BOX
Patient was seen at bedside and examined. Patient is feeling better, denied pain in hip at this time. Denied SOB or chest pain.     REVIEW OF SYSTEMS:    CONSTITUTIONAL: no fevers or chills  RESPIRATORY: + cough. No shortness of breath  CARDIOVASCULAR: No chest pain or palpitations  NEUROLOGICAL: No numbness or weakness  MUSCULOSKELETAL: No pain  All other review of systems is negative unless indicated above.          Vital Signs Last 24 Hrs  T(C): 36.4 (25 Apr 2025 08:29), Max: 36.9 (24 Apr 2025 21:13)  T(F): 97.6 (25 Apr 2025 08:29), Max: 98.5 (24 Apr 2025 21:13)  HR: 78 (25 Apr 2025 08:29) (63 - 94)  BP: 126/85 (25 Apr 2025 08:29) (114/78 - 126/85)  BP(mean): --  RR: 16 (25 Apr 2025 08:29) (16 - 17)  SpO2: 94% (25 Apr 2025 08:29) (91% - 96%)    Parameters below as of 24 Apr 2025 13:48  Patient On (Oxygen Delivery Method): nasal cannula  O2 Flow (L/min): 3    PHYSICAL EXAM   CONSTITUTIONAL:  no apparent distress  NECK: symmetric and without tracheal deviation   RESP: +rales b/l, No respiratory distress  CV: RRR, +S1S2  MSK: No cyanosis  EXT: +swelling in feet b/l, no pitting edema  NEURO: sensation intact in upper and lower extremities b/l to light touch   PSYCH: Appropriate insight/judgment; A+O x 3, mood and affect appropriate    LABS:             10.9   7.26  )-----------( 139      ( 25 Apr 2025 05:33 )             34.1   04-25    142  |  102  |  34[H]  ----------------------------<  75  3.9   |  29  |  2.2[H]    Ca    8.1[L]      25 Apr 2025 05:33    TPro  6.4  /  Alb  3.0[L]  /  TBili  1.4[H]  /  DBili  x   /  AST  24  /  ALT  31  /  AlkPhos  98  04-25        Urinalysis Basic - ( 25 Apr 2025 05:33 )    Color: x / Appearance: x / SG: x / pH: x  Gluc: 75 mg/dL / Ketone: x  / Bili: x / Urobili: x   Blood: x / Protein: x / Nitrite: x   Leuk Esterase: x / RBC: x / WBC x   Sq Epi: x / Non Sq Epi: x / Bacteria: x    PT/INR - ( 25 Apr 2025 05:33 )   PT: 16.90 sec;   INR: 1.42 ratio      PTT - ( 25 Apr 2025 05:33 )  PTT:37.4 sec    CAPILLARY BLOOD GLUCOSE                   Patient was seen at bedside and examined. Patient reports hip pain significantly improved. Denies fever, chills, n/v, chest pain, SOB.            Vital Signs Last 24 Hrs  T(C): 36.4 (25 Apr 2025 08:29), Max: 36.9 (24 Apr 2025 21:13)  T(F): 97.6 (25 Apr 2025 08:29), Max: 98.5 (24 Apr 2025 21:13)  HR: 78 (25 Apr 2025 08:29) (63 - 94)  BP: 126/85 (25 Apr 2025 08:29) (114/78 - 126/85)  RR: 16 (25 Apr 2025 08:29) (16 - 17)  SpO2: 94% (25 Apr 2025 08:29) (91% - 96%)    Parameters below as of 24 Apr 2025 13:48  Patient On (Oxygen Delivery Method): nasal cannula  O2 Flow (L/min): 3    PHYSICAL EXAM   CONSTITUTIONAL:  no apparent distress  RESP: +rales b/l, No respiratory distress, on 3L NC   CV: RRR, +S1S2  MSK: No cyanosis  EXT: +swelling in feet b/l, 1+ pitting edema b/l LE   NEURO: sensation intact in upper and lower extremities b/l to light touch   PSYCH: Appropriate insight/judgment; A+O x 3, mood and affect appropriate    LABS:             10.9   7.26  )-----------( 139      ( 25 Apr 2025 05:33 )             34.1   04-25    142  |  102  |  34[H]  ----------------------------<  75  3.9   |  29  |  2.2[H]    Ca    8.1[L]      25 Apr 2025 05:33    TPro  6.4  /  Alb  3.0[L]  /  TBili  1.4[H]  /  DBili  x   /  AST  24  /  ALT  31  /  AlkPhos  98  04-25        Urinalysis Basic - ( 25 Apr 2025 05:33 )    Color: x / Appearance: x / SG: x / pH: x  Gluc: 75 mg/dL / Ketone: x  / Bili: x / Urobili: x   Blood: x / Protein: x / Nitrite: x   Leuk Esterase: x / RBC: x / WBC x   Sq Epi: x / Non Sq Epi: x / Bacteria: x    PT/INR - ( 25 Apr 2025 05:33 )   PT: 16.90 sec;   INR: 1.42 ratio      PTT - ( 25 Apr 2025 05:33 )  PTT:37.4 sec    CAPILLARY BLOOD GLUCOSE

## 2025-04-25 NOTE — PROGRESS NOTE ADULT - SUBJECTIVE AND OBJECTIVE BOX
Name: ÁLVARO SELLERS  Age: 84y  Gender: Male    Pt was seen and examined.   c/o:  left hip pain    Allergies:  No Known Allergies      PHYSICAL EXAM:    Vitals:  T(C): 36.9 (04-24-25 @ 21:13), Max: 36.9 (04-24-25 @ 21:13)  HR: 63 (04-24-25 @ 21:13) (50 - 94)  BP: 119/85 (04-24-25 @ 21:13) (113/72 - 119/85)  RR: 16 (04-24-25 @ 21:13) (16 - 17)  SpO2: 91% (04-24-25 @ 21:13) (91% - 96%)  Wt(kg): --Vital Signs Last 24 Hrs  T(C): 36.9 (24 Apr 2025 21:13), Max: 36.9 (24 Apr 2025 21:13)  T(F): 98.5 (24 Apr 2025 21:13), Max: 98.5 (24 Apr 2025 21:13)  HR: 63 (24 Apr 2025 21:13) (50 - 94)  BP: 119/85 (24 Apr 2025 21:13) (113/72 - 119/85)  BP(mean): --  RR: 16 (24 Apr 2025 21:13) (16 - 17)  SpO2: 91% (24 Apr 2025 21:13) (91% - 96%)    Parameters below as of 24 Apr 2025 13:48  Patient On (Oxygen Delivery Method): nasal cannula  O2 Flow (L/min): 3        NECK: Supple, No JVD  CHEST/LUNG: CTA, B/L, No rales, rhonchi, wheezing, or rubs  HEART: S1,S2, N1 Regular rate and rhythm; No murmurs, rubs, or gallops  ABDOMEN: Soft, Nontender, Nondistended; Bowel sounds present  EXTREMITIES:  2+ Peripheral Pulses, No clubbing, cyanosis, or edema      LABS:                        10.5   9.20  )-----------( 144      ( 24 Apr 2025 08:43 )             32.9     04-24    143  |  103  |  31[H]  ----------------------------<  86  3.5   |  27  |  2.0[H]    Ca    8.1[L]      24 Apr 2025 08:43  Mg     2.1     04-23    TPro  7.2  /  Alb  3.4[L]  /  TBili  1.5[H]  /  DBili  x   /  AST  46[H]  /  ALT  46[H]  /  AlkPhos  107  04-23    LIVER FUNCTIONS - ( 23 Apr 2025 05:06 )  Alb: 3.4 g/dL / Pro: 7.2 g/dL / ALK PHOS: 107 U/L / ALT: 46 U/L / AST: 46 U/L / GGT: x                 MEDICATIONS  (STANDING):  aspirin enteric coated 81 milliGRAM(s) Oral daily  atorvastatin 80 milliGRAM(s) Oral at bedtime  chlorhexidine 4% Liquid 1 Application(s) Topical <User Schedule>  colchicine 0.6 milliGRAM(s) Oral daily  escitalopram 20 milliGRAM(s) Oral daily  fludroCORTISONE 0.1 milliGRAM(s) Oral daily  furosemide   Injectable 40 milliGRAM(s) IV Push two times a day  heparin   Injectable 5000 Unit(s) SubCutaneous every 8 hours  mirtazapine 15 milliGRAM(s) Oral at bedtime  pantoprazole    Tablet 40 milliGRAM(s) Oral before breakfast  spironolactone 25 milliGRAM(s) Oral daily  tamsulosin 0.4 milliGRAM(s) Oral at bedtime        RADIOLOGY & ADDITIONAL TESTS:    Imaging Personally Reviewed:  [ ] YES  [ ] NO    A/P:  Pt is a 83yo M with a pmhx of HTN, Hyperlipidemia, GERD, anemia, TBI, splenectomy, skin CA s/p excision and radiation, Hep C (treated), ex-smoker quit 50 years ago, systolic CHF, MR/TR/AS, pulmonary htn, orthostatic hypotension, thoracic compression fracture, and CVA whom was BIBA to Ed after a fall at home.  Patient states that he was standing from chair and walker rolled forward and he fell landing on left side. Patient denies head injury no loss of consciousness. Patient states that since having pain in left hip and couldn't stand. Patient denies any chest pain or sob.     #  Left hip fracture    -bedrest/NWB LLE  -ortho consult  -npo p midnite for surgery  -check T&S, coags  -keep 2 units on hold for surgery  -hold plavix  -d/w ortho team  -pain meds prn tylneol, tramadol, morphine  -dvt prophylaxis with sq heparin  -chg bath    #anemia, 2/2 ACD,   -check iron, tibc, ferritin, b12, folate  -monitor cbc    #Chronic systolic CHF - stable   #moderate pericardial effusion - 2/2 CKD likely   #valvular heart dz with pulmonary htn  -daily weights, I&O  -start colchicine 0.6mg daily  -c/w torsemide and spironolactone  -give dose of lasix today    #CKD3  -creatinine baseline around 1.8 as per Dr Enriquez  creatinine is 2 , about baseline   -received 500cc LR in ED  -will hold off on ivf being CHF/signs of fluid overlaod  -check urine sodium, creatinine  -monitor I&O and bmp  -r/b sono    #orthostatic hypotension  -c/w florinef    #compression fractures lumbar and thoracic  -no acute back pain  -will need TLSO brace after surgery/when ambulating    #CVA hx  -c/w lipitor and asa  -hold plavix    At this time I think pt is optimized for surgery as much as possible and should proceed as scheduled.

## 2025-04-26 LAB
ALBUMIN SERPL ELPH-MCNC: 3 G/DL — LOW (ref 3.5–5.2)
ALP SERPL-CCNC: 87 U/L — SIGNIFICANT CHANGE UP (ref 30–115)
ALT FLD-CCNC: 15 U/L — SIGNIFICANT CHANGE UP (ref 0–41)
ANION GAP SERPL CALC-SCNC: 11 MMOL/L — SIGNIFICANT CHANGE UP (ref 7–14)
AST SERPL-CCNC: 21 U/L — SIGNIFICANT CHANGE UP (ref 0–41)
BASOPHILS # BLD AUTO: 0 K/UL — SIGNIFICANT CHANGE UP (ref 0–0.2)
BASOPHILS NFR BLD AUTO: 0 % — SIGNIFICANT CHANGE UP (ref 0–1)
BILIRUB SERPL-MCNC: 0.9 MG/DL — SIGNIFICANT CHANGE UP (ref 0.2–1.2)
BUN SERPL-MCNC: 39 MG/DL — HIGH (ref 10–20)
CALCIUM SERPL-MCNC: 8.3 MG/DL — LOW (ref 8.4–10.5)
CHLORIDE SERPL-SCNC: 98 MMOL/L — SIGNIFICANT CHANGE UP (ref 98–110)
CO2 SERPL-SCNC: 29 MMOL/L — SIGNIFICANT CHANGE UP (ref 17–32)
CREAT SERPL-MCNC: 2.3 MG/DL — HIGH (ref 0.7–1.5)
EGFR: 27 ML/MIN/1.73M2 — LOW
EGFR: 27 ML/MIN/1.73M2 — LOW
EOSINOPHIL # BLD AUTO: 0 K/UL — SIGNIFICANT CHANGE UP (ref 0–0.7)
EOSINOPHIL NFR BLD AUTO: 0 % — SIGNIFICANT CHANGE UP (ref 0–8)
GLUCOSE SERPL-MCNC: 149 MG/DL — HIGH (ref 70–99)
HCT VFR BLD CALC: 31.4 % — LOW (ref 42–52)
HGB BLD-MCNC: 10.1 G/DL — LOW (ref 14–18)
IMM GRANULOCYTES NFR BLD AUTO: 0.4 % — HIGH (ref 0.1–0.3)
LYMPHOCYTES # BLD AUTO: 0.36 K/UL — LOW (ref 1.2–3.4)
LYMPHOCYTES # BLD AUTO: 4.5 % — LOW (ref 20.5–51.1)
MAGNESIUM SERPL-MCNC: 2.1 MG/DL — SIGNIFICANT CHANGE UP (ref 1.8–2.4)
MCHC RBC-ENTMCNC: 32.2 G/DL — SIGNIFICANT CHANGE UP (ref 32–37)
MCHC RBC-ENTMCNC: 32.7 PG — HIGH (ref 27–31)
MCV RBC AUTO: 101.6 FL — HIGH (ref 80–94)
MONOCYTES # BLD AUTO: 0.62 K/UL — HIGH (ref 0.1–0.6)
MONOCYTES NFR BLD AUTO: 7.8 % — SIGNIFICANT CHANGE UP (ref 1.7–9.3)
NEUTROPHILS # BLD AUTO: 6.92 K/UL — HIGH (ref 1.4–6.5)
NEUTROPHILS NFR BLD AUTO: 87.3 % — HIGH (ref 42.2–75.2)
NRBC BLD AUTO-RTO: 0 /100 WBCS — SIGNIFICANT CHANGE UP (ref 0–0)
PHOSPHATE SERPL-MCNC: 4.3 MG/DL — SIGNIFICANT CHANGE UP (ref 2.1–4.9)
PLATELET # BLD AUTO: 143 K/UL — SIGNIFICANT CHANGE UP (ref 130–400)
PMV BLD: 14 FL — HIGH (ref 7.4–10.4)
POTASSIUM SERPL-MCNC: 4 MMOL/L — SIGNIFICANT CHANGE UP (ref 3.5–5)
POTASSIUM SERPL-SCNC: 4 MMOL/L — SIGNIFICANT CHANGE UP (ref 3.5–5)
PROT SERPL-MCNC: 6.5 G/DL — SIGNIFICANT CHANGE UP (ref 6–8)
RBC # BLD: 3.09 M/UL — LOW (ref 4.7–6.1)
RBC # FLD: 16.9 % — HIGH (ref 11.5–14.5)
SODIUM SERPL-SCNC: 138 MMOL/L — SIGNIFICANT CHANGE UP (ref 135–146)
WBC # BLD: 7.93 K/UL — SIGNIFICANT CHANGE UP (ref 4.8–10.8)
WBC # FLD AUTO: 7.93 K/UL — SIGNIFICANT CHANGE UP (ref 4.8–10.8)

## 2025-04-26 RX ORDER — FERROUS SULFATE 137(45) MG
325 TABLET, EXTENDED RELEASE ORAL DAILY
Refills: 0 | Status: DISCONTINUED | OUTPATIENT
Start: 2025-04-26 | End: 2025-04-28

## 2025-04-26 RX ORDER — CLOPIDOGREL BISULFATE 75 MG/1
75 TABLET, FILM COATED ORAL DAILY
Refills: 0 | Status: DISCONTINUED | OUTPATIENT
Start: 2025-04-26 | End: 2025-04-28

## 2025-04-26 RX ADMIN — Medication 20 MILLIGRAM(S): at 05:16

## 2025-04-26 RX ADMIN — Medication 100 MILLIGRAM(S): at 05:16

## 2025-04-26 RX ADMIN — COLCHICINE 0.6 MILLIGRAM(S): 0.6 TABLET, FILM COATED ORAL at 12:50

## 2025-04-26 RX ADMIN — TAMSULOSIN HYDROCHLORIDE 0.4 MILLIGRAM(S): 0.4 CAPSULE ORAL at 21:27

## 2025-04-26 RX ADMIN — CLOPIDOGREL BISULFATE 75 MILLIGRAM(S): 75 TABLET, FILM COATED ORAL at 12:50

## 2025-04-26 RX ADMIN — ESCITALOPRAM OXALATE 20 MILLIGRAM(S): 20 TABLET ORAL at 12:50

## 2025-04-26 RX ADMIN — FUROSEMIDE 40 MILLIGRAM(S): 10 INJECTION INTRAMUSCULAR; INTRAVENOUS at 05:16

## 2025-04-26 RX ADMIN — Medication 0.1 MILLIGRAM(S): at 05:17

## 2025-04-26 RX ADMIN — Medication 325 MILLIGRAM(S): at 12:50

## 2025-04-26 RX ADMIN — FUROSEMIDE 40 MILLIGRAM(S): 10 INJECTION INTRAMUSCULAR; INTRAVENOUS at 15:09

## 2025-04-26 RX ADMIN — ATORVASTATIN CALCIUM 80 MILLIGRAM(S): 80 TABLET, FILM COATED ORAL at 21:25

## 2025-04-26 RX ADMIN — HEPARIN SODIUM 5000 UNIT(S): 1000 INJECTION INTRAVENOUS; SUBCUTANEOUS at 05:16

## 2025-04-26 RX ADMIN — Medication 25 MILLIGRAM(S): at 05:16

## 2025-04-26 RX ADMIN — HEPARIN SODIUM 5000 UNIT(S): 1000 INJECTION INTRAVENOUS; SUBCUTANEOUS at 21:25

## 2025-04-26 RX ADMIN — Medication 40 MILLIGRAM(S): at 05:17

## 2025-04-26 RX ADMIN — MIRTAZAPINE 15 MILLIGRAM(S): 30 TABLET, FILM COATED ORAL at 21:25

## 2025-04-26 RX ADMIN — Medication 81 MILLIGRAM(S): at 12:50

## 2025-04-26 RX ADMIN — HEPARIN SODIUM 5000 UNIT(S): 1000 INJECTION INTRAVENOUS; SUBCUTANEOUS at 15:09

## 2025-04-26 NOTE — CONSULT NOTE ADULT - CONSULT REASON
Patient reports she has had a colonoscopy at  [facility] 2024, please obtain records, thanks!
GOC
Hip fx
ASHLEY CKD

## 2025-04-26 NOTE — OCCUPATIONAL THERAPY INITIAL EVALUATION ADULT - LEVEL OF INDEPENDENCE: TOILET, REHAB EVAL
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 Deferred EOB/OOB at this time. TBA when appropriate. +urinal at this time

## 2025-04-26 NOTE — OCCUPATIONAL THERAPY INITIAL EVALUATION ADULT - STANDING BALANCE: DYNAMIC, REHAB EVAL
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 TBA when appropriate.

## 2025-04-26 NOTE — OCCUPATIONAL THERAPY INITIAL EVALUATION ADULT - PERTINENT HX OF CURRENT PROBLEM, REHAB EVAL
85yo M with a pmhx of HTN, Hyperlipidemia, GERD, anemia, TBI, splenectomy, skin CA s/p excision and radiation, Hep C (treated), ex-smoker quit 50 years ago, systolic CHF, MR/TR/AS, pulmonary htn, orthostatic hypotension, thoracic compression fracture, and CVA whom was BIBA to Ed after a fall at home.  Patient states that he was standing from chair and walker rolled forward and he fell landing on left side. Patient denies head injury no loss of consciousness. Patient states that since having pain in left hip and couldn't stand. Patient denies any chest pain or sob.     CT HEAD: Slightly increased thin CSF density subdural fluid collection along the right frontal parietal convexity since the CT head from 11/9/2024 which may reflect subdural hygroma. No acute intracranial hemorrhage or midline shift.  CT CERVICAL SPINE: No acute cervical fracture or facet subluxation. Partially imaged bilateral pleural effusions.   XRAY (L) HIP: Acute left femoral neck fracture with impaction. Moderate degenerative changes of the bilateral sacroiliac and hip joints. Diffuse osteopenia. 85yo M with a pmhx of HTN, Hyperlipidemia, GERD, anemia, TBI, splenectomy, skin CA s/p excision and radiation, Hep C (treated), ex-smoker quit 50 years ago, systolic CHF, MR/TR/AS, pulmonary htn, orthostatic hypotension, thoracic compression fracture, and CVA whom was BIBA to [facility] after a fall at home.  Patient states that he was standing from chair and walker rolled forward and he fell landing on left side. Patient denies head injury no loss of consciousness. Patient states that since having pain in left hip and couldn't stand. Patient denies any chest pain or sob.     s/p Closed reduction and percutaneous pinning of left hip; POD#1; general anesthesia     CT HEAD: Slightly increased thin CSF density subdural fluid collection along the right frontal parietal convexity since the CT head from 11/9/2024 which may reflect subdural hygroma. No acute intracranial hemorrhage or midline shift.  CT CERVICAL SPINE: No acute cervical fracture or facet subluxation. Partially imaged bilateral pleural effusions.   XRAY (L) HIP: Acute left femoral neck fracture with impaction. Moderate degenerative changes of the bilateral sacroiliac and hip joints. Diffuse osteopenia.

## 2025-04-26 NOTE — OCCUPATIONAL THERAPY INITIAL EVALUATION ADULT - LIVES WITH, PROFILE
wife in a private home +6 steps to enter with (B) handrails then level for living area +tub +grab bar +shower chair +standard toilet/spouse

## 2025-04-26 NOTE — PROGRESS NOTE ADULT - SUBJECTIVE AND OBJECTIVE BOX
Name: ÁLVARO SELLERS  Age: 84y  Gender: Male    Pt was seen and examined.   c/o:  doing much better he says   wife bedside     Allergies:  No Known Allergies      PHYSICAL EXAM:    Vitals:  T(C): 36.8 (04-26-25 @ 21:31), Max: 36.8 (04-26-25 @ 14:14)  HR: 76 (04-26-25 @ 21:31) (71 - 83)  BP: 114/84 (04-26-25 @ 21:31) (112/73 - 119/78)  RR: 16 (04-26-25 @ 21:31) (16 - 17)  SpO2: 95% (04-26-25 @ 21:31) (95% - 99%)  Wt(kg): --Vital Signs Last 24 Hrs  T(C): 36.8 (26 Apr 2025 21:31), Max: 36.8 (26 Apr 2025 14:14)  T(F): 98.2 (26 Apr 2025 21:31), Max: 98.3 (26 Apr 2025 14:14)  HR: 76 (26 Apr 2025 21:31) (71 - 83)  BP: 114/84 (26 Apr 2025 21:31) (112/73 - 119/78)  BP(mean): --  RR: 16 (26 Apr 2025 21:31) (16 - 17)  SpO2: 95% (26 Apr 2025 21:31) (95% - 99%)          NECK: Supple, No JVD  CHEST/LUNG: CTA, B/L, No rales, rhonchi, wheezing, or rubs  HEART: S1,S2, N1 Regular rate and rhythm; No murmurs, rubs, or gallops  ABDOMEN: Soft, Nontender, Nondistended; Bowel sounds present  EXTREMITIES:  2+ Peripheral Pulses, No clubbing, cyanosis, 2+ b/l  edema      LABS:                        10.1   7.93  )-----------( 143      ( 26 Apr 2025 09:11 )             31.4     04-26    138  |  98  |  39[H]  ----------------------------<  149[H]  4.0   |  29  |  2.3[H]    Ca    8.3[L]      26 Apr 2025 09:11  Phos  4.3     04-26  Mg     2.1     04-26    TPro  6.5  /  Alb  3.0[L]  /  TBili  0.9  /  DBili  x   /  AST  21  /  ALT  15  /  AlkPhos  87  04-26    LIVER FUNCTIONS - ( 26 Apr 2025 09:11 )  Alb: 3.0 g/dL / Pro: 6.5 g/dL / ALK PHOS: 87 U/L / ALT: 15 U/L / AST: 21 U/L / GGT: x                 MEDICATIONS  (STANDING):  aspirin enteric coated 81 milliGRAM(s) Oral daily  atorvastatin 80 milliGRAM(s) Oral at bedtime  clopidogrel Tablet 75 milliGRAM(s) Oral daily  colchicine 0.6 milliGRAM(s) Oral daily  escitalopram 20 milliGRAM(s) Oral daily  ferrous    sulfate 325 milliGRAM(s) Oral daily  fludroCORTISONE 0.1 milliGRAM(s) Oral daily  furosemide   Injectable 40 milliGRAM(s) IV Push two times a day  heparin   Injectable 5000 Unit(s) SubCutaneous every 8 hours  mirtazapine 15 milliGRAM(s) Oral at bedtime  pantoprazole    Tablet 40 milliGRAM(s) Oral before breakfast  spironolactone 25 milliGRAM(s) Oral daily  tamsulosin 0.4 milliGRAM(s) Oral at bedtime  torsemide 20 milliGRAM(s) Oral daily        RADIOLOGY & ADDITIONAL TESTS:    Imaging Personally Reviewed:  [ ] YES  [ ] NO    A/P:  Pt is a 85yo M with a pmhx of HTN, Hyperlipidemia, GERD, anemia, TBI, splenectomy, skin CA s/p excision and radiation, Hep C (treated), ex-smoker quit 50 years ago, systolic CHF, MR/TR/AS, pulmonary htn, orthostatic hypotension, thoracic compression fracture, and CVA whom was BIBA to [facility] after a fall at home admitted for:      #Left hip fracture       s/p left hip surgery, doing well, had PT today, walked with wife    #Chronic systolic CHF - mild exacerbation  but stable   #moderate pericardial effusion - 2/2 CKD likely   #valvular heart dz with pulmonary htn  - pt is only on Torsemide 20mg and Spironolactone 25mf po daily (both) and will cont same meds as they are     #Acute on CKD3  - Baseline Cr around 1.8   for the last year or so  appreciate Renal input   certainly a BR US reasonable         #anemia, 2/2 ACD and CKD  - H/H stable   - Iron panel noted, will start on iron supplements   - B12/Folate WNL     #orthostatic hypotension  -c/w florinef    #compression fractures lumbar and thoracic  - no acute back pain  - will need TLSO brace after surgery/when ambulating    #CVA hx  - c/w Lipitor and asa  - hold Plavix given ortho trauma       VTE PPX: SQ heparin   Dispo: PT consult in am and prepare for STR  d/w pts wife bedside

## 2025-04-26 NOTE — CONSULT NOTE ADULT - ASSESSMENT
Pt is a 83yo M with a pmhx of HTN, Hyperlipidemia, GERD, anemia, TBI, splenectomy, skin CA s/p excision and radiation, Hep C (treated), ex-smoker quit 50 years ago, systolic CHF, MR/TR/AS, pulmonary htn, orthostatic hypotension, thoracic compression fracture, and CVA whom was BIBA to Ed after a fall at home.  Patient states that he was standing from chair and walker rolled forward and he fell landing on left side. Patient denies head injury no loss of consciousness. Patient states that since having pain in left hip and couldn't stand. Patient denies any chest pain or sob.     #  Left hip fracture  #CKD3  -creatinine baseline around 1.8 as per Dr Enriquez  creatinine is 2 , about baseline   -received 500cc LR in ED  - not on IVF , no Sauer  - no LE edema, on NC  -will hold off on ivf being CHF/signs of fluid overlaod  -monitor I&O and bmp  -r/b sono  Rt hydro improving 4/23/25    #anemia, likely of chronic dz,  -check iron, tibc, ferritin, b12, folate  -monitor cbc    #Chronic systolic CHF  #moderate pericardial effusion - 2/2 CKD likely   #valvular heart dz with pulmonary htn  -start colchicine 0.6mg daily  -on  torsemide and spironolactone    #orthostatic hypotension  -c/w florinef Pt is a 83yo M with a pmhx of HTN, Hyperlipidemia, GERD, anemia, TBI, splenectomy, skin CA s/p excision and radiation, Hep C (treated), ex-smoker quit 50 years ago, systolic CHF, MR/TR/AS, pulmonary htn, orthostatic hypotension, thoracic compression fracture, and CVA whom was BIBA to Ed after a fall at home.  Patient states that he was standing from chair and walker rolled forward and he fell landing on left side. Patient denies head injury no loss of consciousness. Patient states that since having pain in left hip and couldn't stand. Patient denies any chest pain or sob.     #  Left hip fracture  #CKD3 - Rt hydro - improving , Bladder volume 540 cc  -creatinine baseline around 1.8 as per Dr Enriquez  creatinine is 2 , about baseline   -received 500cc LR in ED no more IVF - in view of CHF and pHTN, AS  -repeat Bladder and Kidney sono to assure lack of retention and Rt hydro  -place a condom catheter for UO   - pt is  not on IVF , no Sauer  - no LE edema, on NC  - pt is on lasix 40 mg IV q12 and Torsemide 20 mg qd - would hold off Torsemide  Monitor BP to avoid hypotension on high dose of diuretics  creat rising b/o IV Lasix  -check 2D Echo for IVC size - if dilated, cont IV Lasix, but if not  - would stop IV lasix and cont Torsemide 40 or 20 qd  -chek pericardial effusion  moderate on admission    #anemia, likely of chronic dz,  -check iron, tibc, ferritin, b12, folate  -monitor cbc    #Chronic systolic CHF  #moderate pericardial effusion - 2/2 CKD likely   #valvular heart dz with pulmonary htn  -start colchicine 0.6mg daily  -on  torsemide and spironolactone    #orthostatic hypotension -c/w florinef    will follow

## 2025-04-26 NOTE — CONSULT NOTE ADULT - SUBJECTIVE AND OBJECTIVE BOX
NEPHROLOGY CONSULTATION NOTE    Pt is a 85yo M with a pmhx of HTN, Hyperlipidemia, GERD, anemia, TBI, splenectomy, skin CA s/p excision and radiation, Hep C (treated), ex-smoker quit 50 years ago, systolic CHF, MR/TR/AS, pulmonary htn, orthostatic hypotension, thoracic compression fracture, and CVA whom was BIBA to [facility] after a fall at home.  Patient states that he was standing from chair and walker rolled forward and he fell landing on left side. Patient denies head injury no loss of consciousness. Patient states that since having pain in left hip and couldn't stand. Patient denies any chest pain or sob.     renal was called for ASHLEY on CKD - creat 2.0->2.2  PAST MEDICAL & SURGICAL HISTORY:  Hypercholesteremia      Gastroesophageal reflux disease, esophagitis presence not specified      Other depression      Hepatitis C virus infection cured after antiviral drug therapy      Skin cancer  left ear      Benign prostate hyperplasia      Spleen injury      Depression      CVA (cerebral vascular accident)      Anemia      HTN (hypertension)      Acute systolic congestive heart failure      H/O orthostatic hypotension      Tricuspid regurgitation      H/O pulmonary hypertension      Mitral regurgitation      Aortic stenosis      Compression fracture of vertebra      H/O spleen injury      History of skin cancer        Allergies:  No Known Allergies    Home Medications Reviewed  Hospital Medications:   MEDICATIONS  (STANDING):  aspirin enteric coated 81 milliGRAM(s) Oral daily  atorvastatin 80 milliGRAM(s) Oral at bedtime  colchicine 0.6 milliGRAM(s) Oral daily  escitalopram 20 milliGRAM(s) Oral daily  fludroCORTISONE 0.1 milliGRAM(s) Oral daily  furosemide   Injectable 40 milliGRAM(s) IV Push two times a day  heparin   Injectable 5000 Unit(s) SubCutaneous every 8 hours  mirtazapine 15 milliGRAM(s) Oral at bedtime  pantoprazole    Tablet 40 milliGRAM(s) Oral before breakfast  spironolactone 25 milliGRAM(s) Oral daily  tamsulosin 0.4 milliGRAM(s) Oral at bedtime  torsemide 20 milliGRAM(s) Oral daily      SOCIAL HISTORY:  Denies ETOH,Smoking,   FAMILY HISTORY:  Family history of breast cancer in female (Mother)    Family history of throat cancer (Father)      DNI: Trial NIV        REVIEW OF SYSTEMS:  CONSTITUTIONAL: No weakness, fevers or chills  EYES/ENT: No visual changes;  No vertigo or throat pain   NECK: No pain or stiffness  RESPIRATORY: No cough, wheezing, hemoptysis; No shortness of breath  CARDIOVASCULAR: No chest pain or palpitations.  GASTROINTESTINAL: No abdominal or epigastric pain. No nausea, vomiting, or hematemesis;  GENITOURINARY: No dysuria, frequency, foamy urine, urinary urgency, incontinence or hematuria  NEUROLOGICAL: No numbness or weakness  SKIN: No itching, burning, rashes, or lesions   VASCULAR: No bilateral lower extremity edema.   All other review of systems is negative unless indicated above.    VITALS:  T(F): 97.4 (04-26-25 @ 04:28), Max: 97.7 (04-25-25 @ 13:10)  HR: 77 (04-26-25 @ 04:28)  BP: 112/73 (04-26-25 @ 04:28)  RR: 17 (04-26-25 @ 04:28)  SpO2: 99% (04-26-25 @ 04:28)    04-24 @ 07:01  -  04-25 @ 07:00  --------------------------------------------------------  IN: 0 mL / OUT: 200 mL / NET: -200 mL    04-25 @ 07:01  -  04-26 @ 06:49  --------------------------------------------------------  IN: 450 mL / OUT: 0 mL / NET: 450 mL      Height (cm): 167.6 (04-25 @ 11:00)  Weight (kg): 68 (04-25 @ 11:00)  BMI (kg/m2): 24.2 (04-25 @ 11:00)  BSA (m2): 1.77 (04-25 @ 11:00)      I&O's Detail    24 Apr 2025 07:01  -  25 Apr 2025 07:00  --------------------------------------------------------  IN:  Total IN: 0 mL    OUT:    Voided (mL): 200 mL  Total OUT: 200 mL    Total NET: -200 mL      25 Apr 2025 07:01  -  26 Apr 2025 06:49  --------------------------------------------------------  IN:    Lactated Ringers: 450 mL  Total IN: 450 mL    OUT:  Total OUT: 0 mL    Total NET: 450 mL            PHYSICAL EXAM:  Constitutional: NAD  HEENT: anicteric sclera, oropharynx clear, MMM  Neck: No JVD  Respiratory: CTAB, no wheezes, rales or rhonchi  Cardiovascular: S1, S2, RRR  Gastrointestinal: BS+, soft, NT/ND  Extremities: No cyanosis or clubbing. No peripheral edema  Neurological: A/O x 2 HIOH  Psychiatric: Normal mood, normal affect  : No CVA tenderness. No michelle.   Skin: No rashes  Vascular Access:    LABS:  04-25    142  |  102  |  34[H]  ----------------------------<  75  3.9   |  29  |  2.2[H]    Ca    8.1[L]      25 Apr 2025 05:33    TPro  6.4  /  Alb  3.0[L]  /  TBili  1.4[H]  /  DBili      /  AST  24  /  ALT  31  /  AlkPhos  98  04-25    Creatinine Trend: 2.2 <--, 2.0 <--, 2.0 <--                        10.9   7.26  )-----------( 139      ( 25 Apr 2025 05:33 )             34.1     Urine Studies:  Urinalysis Basic - ( 25 Apr 2025 05:33 )    Color:  / Appearance:  / SG:  / pH:   Gluc: 75 mg/dL / Ketone:   / Bili:  / Urobili:    Blood:  / Protein:  / Nitrite:    Leuk Esterase:  / RBC:  / WBC    Sq Epi:  / Non Sq Epi:  / Bacteria:                 RADIOLOGY & ADDITIONAL STUDIES:    < from: US Kidney and Bladder (04.23.25 @ 13:07) >  Right kidney: 10.7 cm. Improvement in right hydronephrosis, now   mild-to-moderate..    Left kidney: 11.2 cm. No hydronephrosis.    Urinary bladder: Bladder wall trabeculation with bladder wall thickening.   Debris is present within the bladder. Bilateral ureteral jets are not   seen, nonspecific finding. Bladder volume is 546 cc. There is a right   anterior bladder diverticulum measuring 35 cc.    Note: Right pleural effusion, trace perihepatic ascites and small amount   of pelvic ascites.    IMPRESSION:    Improvement in right hydronephrosis, now mild to moderate    Bladder wall trabeculation with bladder wall thickening. Debris is   present within the bladder. Bilateral ureteral jets are not seen,   nonspecific finding. There is a right anterior bladder diverticulum    Note: Right pleural effusion, trace perihepatic ascites and small amount   of pelvic ascites.    < end of copied text >               NEPHROLOGY CONSULTATION NOTE    Pt is a 83yo M with a pmhx of HTN, Hyperlipidemia, GERD, anemia, TBI, splenectomy, skin CA s/p excision and radiation, Hep C (treated), ex-smoker quit 50 years ago, systolic CHF, MR/TR/AS, pulmonary htn, orthostatic hypotension, thoracic compression fracture, and CVA whom was BIBA to Ed after a fall at home.  Patient states that he was standing from chair and walker rolled forward and he fell landing on left side. Patient denies head injury no loss of consciousness. Patient states that since having pain in left hip and couldn't stand. Patient denies any chest pain or sob.     renal was called for ASHLEY on CKD - creat 2.0->2.2    PAST MEDICAL & SURGICAL HISTORY:  Hypercholesteremia      Gastroesophageal reflux disease, esophagitis presence not specified      Other depression      Hepatitis C virus infection cured after antiviral drug therapy      Skin cancer  left ear      Benign prostate hyperplasia      Spleen injury      Depression      CVA (cerebral vascular accident)      Anemia      HTN (hypertension)      Acute systolic congestive heart failure      H/O orthostatic hypotension      Tricuspid regurgitation      H/O pulmonary hypertension      Mitral regurgitation      Aortic stenosis      Compression fracture of vertebra      H/O spleen injury      History of skin cancer        Allergies:  No Known Allergies    Home Medications Reviewed  Hospital Medications:   MEDICATIONS  (STANDING):  aspirin enteric coated 81 milliGRAM(s) Oral daily  atorvastatin 80 milliGRAM(s) Oral at bedtime  colchicine 0.6 milliGRAM(s) Oral daily  escitalopram 20 milliGRAM(s) Oral daily  fludroCORTISONE 0.1 milliGRAM(s) Oral daily  furosemide   Injectable 40 milliGRAM(s) IV Push two times a day  heparin   Injectable 5000 Unit(s) SubCutaneous every 8 hours  mirtazapine 15 milliGRAM(s) Oral at bedtime  pantoprazole    Tablet 40 milliGRAM(s) Oral before breakfast  spironolactone 25 milliGRAM(s) Oral daily  tamsulosin 0.4 milliGRAM(s) Oral at bedtime  torsemide 20 milliGRAM(s) Oral daily      SOCIAL HISTORY:  Denies ETOH,Smoking,   FAMILY HISTORY:  Family history of breast cancer in female (Mother)    Family history of throat cancer (Father)      DNI: Trial NIV        REVIEW OF SYSTEMS:  CONSTITUTIONAL: No weakness, fevers or chills  EYES/ENT: No visual changes;  No vertigo or throat pain   NECK: No pain or stiffness  RESPIRATORY: No cough, wheezing, hemoptysis; No shortness of breath  CARDIOVASCULAR: No chest pain or palpitations.  GASTROINTESTINAL: No abdominal or epigastric pain. No nausea, vomiting, or hematemesis;  GENITOURINARY: No dysuria, frequency  VASCULAR: No bilateral lower extremity edema.   All other review of systems is negative unless indicated above.    VITALS:  T(F): 97.4 (04-26-25 @ 04:28), Max: 97.7 (04-25-25 @ 13:10)  HR: 77 (04-26-25 @ 04:28)  BP: 112/73 (04-26-25 @ 04:28)  RR: 17 (04-26-25 @ 04:28)  SpO2: 99% (04-26-25 @ 04:28)    04-24 @ 07:01  -  04-25 @ 07:00  --------------------------------------------------------  IN: 0 mL / OUT: 200 mL / NET: -200 mL    04-25 @ 07:01  -  04-26 @ 06:49  --------------------------------------------------------  IN: 450 mL / OUT: 0 mL / NET: 450 mL      Height (cm): 167.6 (04-25 @ 11:00)  Weight (kg): 68 (04-25 @ 11:00)  BMI (kg/m2): 24.2 (04-25 @ 11:00)  BSA (m2): 1.77 (04-25 @ 11:00)      I&O's Detail    24 Apr 2025 07:01  -  25 Apr 2025 07:00  --------------------------------------------------------  IN:  Total IN: 0 mL    OUT:    Voided (mL): 200 mL  Total OUT: 200 mL    Total NET: -200 mL      25 Apr 2025 07:01  -  26 Apr 2025 06:49  --------------------------------------------------------  IN:    Lactated Ringers: 450 mL  Total IN: 450 mL    OUT:  Total OUT: 0 mL    Total NET: 450 mL            PHYSICAL EXAM:  Constitutional: NAD  HEENT: anicteric sclera, oropharynx clear, MMM  Neck: No JVD  Respiratory: CTAB, no wheezes, rales or rhonchi  Cardiovascular: S1, S2, RRR  Gastrointestinal: BS+, soft, NT/ND  Extremities: No cyanosis or clubbing. No peripheral edema  Neurological: A/O x 2 HIOH  Psychiatric: Normal mood, normal affect  : No CVA tenderness. No michelle.   Skin: No rashes  Vascular Access:    LABS:  04-25    142  |  102  |  34[H]  ----------------------------<  75  3.9   |  29  |  2.2[H]    Ca    8.1[L]      25 Apr 2025 05:33    TPro  6.4  /  Alb  3.0[L]  /  TBili  1.4[H]  /  DBili      /  AST  24  /  ALT  31  /  AlkPhos  98  04-25    Creatinine Trend: 2.2 <--, 2.0 <--, 2.0 <--                        10.9   7.26  )-----------( 139      ( 25 Apr 2025 05:33 )             34.1     Urine Studies:  Urinalysis Basic - ( 25 Apr 2025 05:33 )    Color:  / Appearance:  / SG:  / pH:   Gluc: 75 mg/dL / Ketone:   / Bili:  / Urobili:    Blood:  / Protein:  / Nitrite:    Leuk Esterase:  / RBC:  / WBC    Sq Epi:  / Non Sq Epi:  / Bacteria:                 RADIOLOGY & ADDITIONAL STUDIES:    < from: US Kidney and Bladder (04.23.25 @ 13:07) >  Right kidney: 10.7 cm. Improvement in right hydronephrosis, now   mild-to-moderate..    Left kidney: 11.2 cm. No hydronephrosis.    Urinary bladder: Bladder wall trabeculation with bladder wall thickening.   Debris is present within the bladder. Bilateral ureteral jets are not   seen, nonspecific finding. Bladder volume is 546 cc. There is a right   anterior bladder diverticulum measuring 35 cc.    Note: Right pleural effusion, trace perihepatic ascites and small amount   of pelvic ascites.    IMPRESSION:    Improvement in right hydronephrosis, now mild to moderate    Bladder wall trabeculation with bladder wall thickening. Debris is   present within the bladder. Bilateral ureteral jets are not seen,   nonspecific finding. There is a right anterior bladder diverticulum    Note: Right pleural effusion, trace perihepatic ascites and small amount   of pelvic ascites.    < end of copied text >

## 2025-04-26 NOTE — OCCUPATIONAL THERAPY INITIAL EVALUATION ADULT - GENERAL OBSERVATIONS, REHAB EVAL
13:25-13:40; chart reviewed, ok to treat by Occupational Therapist as confirmed by OMAYRA Morrison, Pt received semifowler +(L) hip bandage +BUE heplock +2L/min O2 via nc in NAD. Pt denied pain at rest and in agreement with OT IE.

## 2025-04-27 LAB
ALBUMIN SERPL ELPH-MCNC: 3.1 G/DL — LOW (ref 3.5–5.2)
ALP SERPL-CCNC: 88 U/L — SIGNIFICANT CHANGE UP (ref 30–115)
ALT FLD-CCNC: <5 U/L — SIGNIFICANT CHANGE UP (ref 0–41)
ANION GAP SERPL CALC-SCNC: 13 MMOL/L — SIGNIFICANT CHANGE UP (ref 7–14)
AST SERPL-CCNC: 21 U/L — SIGNIFICANT CHANGE UP (ref 0–41)
BASOPHILS # BLD AUTO: 0.02 K/UL — SIGNIFICANT CHANGE UP (ref 0–0.2)
BASOPHILS NFR BLD AUTO: 0.3 % — SIGNIFICANT CHANGE UP (ref 0–1)
BILIRUB SERPL-MCNC: 1 MG/DL — SIGNIFICANT CHANGE UP (ref 0.2–1.2)
BUN SERPL-MCNC: 43 MG/DL — HIGH (ref 10–20)
CALCIUM SERPL-MCNC: 8.3 MG/DL — LOW (ref 8.4–10.5)
CHLORIDE SERPL-SCNC: 95 MMOL/L — LOW (ref 98–110)
CO2 SERPL-SCNC: 30 MMOL/L — SIGNIFICANT CHANGE UP (ref 17–32)
CREAT SERPL-MCNC: 2.3 MG/DL — HIGH (ref 0.7–1.5)
EGFR: 27 ML/MIN/1.73M2 — LOW
EGFR: 27 ML/MIN/1.73M2 — LOW
EOSINOPHIL # BLD AUTO: 0.19 K/UL — SIGNIFICANT CHANGE UP (ref 0–0.7)
EOSINOPHIL NFR BLD AUTO: 3 % — SIGNIFICANT CHANGE UP (ref 0–8)
GLUCOSE SERPL-MCNC: 98 MG/DL — SIGNIFICANT CHANGE UP (ref 70–99)
HCT VFR BLD CALC: 31.5 % — LOW (ref 42–52)
HGB BLD-MCNC: 10.2 G/DL — LOW (ref 14–18)
IMM GRANULOCYTES NFR BLD AUTO: 0.3 % — SIGNIFICANT CHANGE UP (ref 0.1–0.3)
LYMPHOCYTES # BLD AUTO: 0.81 K/UL — LOW (ref 1.2–3.4)
LYMPHOCYTES # BLD AUTO: 12.6 % — LOW (ref 20.5–51.1)
MAGNESIUM SERPL-MCNC: 2.2 MG/DL — SIGNIFICANT CHANGE UP (ref 1.8–2.4)
MCHC RBC-ENTMCNC: 32.3 PG — HIGH (ref 27–31)
MCHC RBC-ENTMCNC: 32.4 G/DL — SIGNIFICANT CHANGE UP (ref 32–37)
MCV RBC AUTO: 99.7 FL — HIGH (ref 80–94)
MONOCYTES # BLD AUTO: 0.7 K/UL — HIGH (ref 0.1–0.6)
MONOCYTES NFR BLD AUTO: 10.9 % — HIGH (ref 1.7–9.3)
NEUTROPHILS # BLD AUTO: 4.69 K/UL — SIGNIFICANT CHANGE UP (ref 1.4–6.5)
NEUTROPHILS NFR BLD AUTO: 72.9 % — SIGNIFICANT CHANGE UP (ref 42.2–75.2)
NRBC BLD AUTO-RTO: 0 /100 WBCS — SIGNIFICANT CHANGE UP (ref 0–0)
PHOSPHATE SERPL-MCNC: 3.4 MG/DL — SIGNIFICANT CHANGE UP (ref 2.1–4.9)
PLATELET # BLD AUTO: 154 K/UL — SIGNIFICANT CHANGE UP (ref 130–400)
PMV BLD: 13.1 FL — HIGH (ref 7.4–10.4)
POTASSIUM SERPL-MCNC: 3.5 MMOL/L — SIGNIFICANT CHANGE UP (ref 3.5–5)
POTASSIUM SERPL-SCNC: 3.5 MMOL/L — SIGNIFICANT CHANGE UP (ref 3.5–5)
PROT SERPL-MCNC: 6.5 G/DL — SIGNIFICANT CHANGE UP (ref 6–8)
RBC # BLD: 3.16 M/UL — LOW (ref 4.7–6.1)
RBC # FLD: 16.7 % — HIGH (ref 11.5–14.5)
SODIUM SERPL-SCNC: 138 MMOL/L — SIGNIFICANT CHANGE UP (ref 135–146)
WBC # BLD: 6.43 K/UL — SIGNIFICANT CHANGE UP (ref 4.8–10.8)
WBC # FLD AUTO: 6.43 K/UL — SIGNIFICANT CHANGE UP (ref 4.8–10.8)

## 2025-04-27 RX ADMIN — Medication 81 MILLIGRAM(S): at 11:25

## 2025-04-27 RX ADMIN — Medication 40 MILLIGRAM(S): at 05:29

## 2025-04-27 RX ADMIN — FUROSEMIDE 40 MILLIGRAM(S): 10 INJECTION INTRAMUSCULAR; INTRAVENOUS at 05:32

## 2025-04-27 RX ADMIN — HEPARIN SODIUM 5000 UNIT(S): 1000 INJECTION INTRAVENOUS; SUBCUTANEOUS at 05:27

## 2025-04-27 RX ADMIN — Medication 325 MILLIGRAM(S): at 11:26

## 2025-04-27 RX ADMIN — FUROSEMIDE 40 MILLIGRAM(S): 10 INJECTION INTRAMUSCULAR; INTRAVENOUS at 14:20

## 2025-04-27 RX ADMIN — ATORVASTATIN CALCIUM 80 MILLIGRAM(S): 80 TABLET, FILM COATED ORAL at 21:31

## 2025-04-27 RX ADMIN — TAMSULOSIN HYDROCHLORIDE 0.4 MILLIGRAM(S): 0.4 CAPSULE ORAL at 21:31

## 2025-04-27 RX ADMIN — Medication 20 MILLIGRAM(S): at 05:29

## 2025-04-27 RX ADMIN — Medication 25 MILLIGRAM(S): at 05:28

## 2025-04-27 RX ADMIN — Medication 0.1 MILLIGRAM(S): at 05:29

## 2025-04-27 RX ADMIN — MIRTAZAPINE 15 MILLIGRAM(S): 30 TABLET, FILM COATED ORAL at 21:31

## 2025-04-27 RX ADMIN — ESCITALOPRAM OXALATE 20 MILLIGRAM(S): 20 TABLET ORAL at 11:26

## 2025-04-27 RX ADMIN — COLCHICINE 0.6 MILLIGRAM(S): 0.6 TABLET, FILM COATED ORAL at 11:26

## 2025-04-27 RX ADMIN — HEPARIN SODIUM 5000 UNIT(S): 1000 INJECTION INTRAVENOUS; SUBCUTANEOUS at 21:30

## 2025-04-27 RX ADMIN — HEPARIN SODIUM 5000 UNIT(S): 1000 INJECTION INTRAVENOUS; SUBCUTANEOUS at 14:21

## 2025-04-27 RX ADMIN — CLOPIDOGREL BISULFATE 75 MILLIGRAM(S): 75 TABLET, FILM COATED ORAL at 11:26

## 2025-04-27 NOTE — PROGRESS NOTE ADULT - SUBJECTIVE AND OBJECTIVE BOX
Name: ÁLVARO SELLERS  Age: 84y  Gender: Male    Pt was seen and examined. s/p L hip surgery  c/o:  feels good he says   walked with PT he says     Allergies:  No Known Allergies      PHYSICAL EXAM:    Vitals:  T(C): 36.6 (04-27-25 @ 22:10), Max: 36.6 (04-27-25 @ 04:47)  HR: 74 (04-27-25 @ 22:10) (74 - 87)  BP: 114/74 (04-27-25 @ 22:10) (114/74 - 131/85)  RR: 16 (04-27-25 @ 14:07) (16 - 16)  SpO2: 97% (04-27-25 @ 22:10) (97% - 97%)  Wt(kg): --Vital Signs Last 24 Hrs  T(C): 36.6 (27 Apr 2025 22:10), Max: 36.6 (27 Apr 2025 04:47)  T(F): 97.8 (27 Apr 2025 22:10), Max: 97.9 (27 Apr 2025 04:47)  HR: 74 (27 Apr 2025 22:10) (74 - 87)  BP: 114/74 (27 Apr 2025 22:10) (114/74 - 131/85)  BP(mean): --  RR: 16 (27 Apr 2025 14:07) (16 - 16)  SpO2: 97% (27 Apr 2025 22:10) (97% - 97%)          NECK: Supple, No JVD  CHEST/LUNG: CTA, B/L, No rales, rhonchi, wheezing, or rubs  HEART: S1,S2, N1 Regular rate and rhythm; No murmurs, rubs, or gallops  ABDOMEN: Soft, Nontender, Nondistended; Bowel sounds present  EXTREMITIES:  2+ Peripheral Pulses, No clubbing, cyanosis, or edema      LABS:                        10.2   6.43  )-----------( 154      ( 27 Apr 2025 09:27 )             31.5     04-27    138  |  95[L]  |  43[H]  ----------------------------<  98  3.5   |  30  |  2.3[H]    Ca    8.3[L]      27 Apr 2025 09:27  Phos  3.4     04-27  Mg     2.2     04-27    TPro  6.5  /  Alb  3.1[L]  /  TBili  1.0  /  DBili  x   /  AST  21  /  ALT  <5  /  AlkPhos  88  04-27    LIVER FUNCTIONS - ( 27 Apr 2025 09:27 )  Alb: 3.1 g/dL / Pro: 6.5 g/dL / ALK PHOS: 88 U/L / ALT: <5 U/L / AST: 21 U/L / GGT: x                 MEDICATIONS  (STANDING):  aspirin enteric coated 81 milliGRAM(s) Oral daily  atorvastatin 80 milliGRAM(s) Oral at bedtime  clopidogrel Tablet 75 milliGRAM(s) Oral daily  colchicine 0.6 milliGRAM(s) Oral daily  escitalopram 20 milliGRAM(s) Oral daily  ferrous    sulfate 325 milliGRAM(s) Oral daily  fludroCORTISONE 0.1 milliGRAM(s) Oral daily  furosemide   Injectable 40 milliGRAM(s) IV Push two times a day  heparin   Injectable 5000 Unit(s) SubCutaneous every 8 hours  mirtazapine 15 milliGRAM(s) Oral at bedtime  pantoprazole    Tablet 40 milliGRAM(s) Oral before breakfast  spironolactone 25 milliGRAM(s) Oral daily  tamsulosin 0.4 milliGRAM(s) Oral at bedtime  torsemide 20 milliGRAM(s) Oral daily        RADIOLOGY & ADDITIONAL TESTS:    Imaging Personally Reviewed:  [ ] YES  [ ] NO    A/P:  Pt is a 85yo M with a pmhx of HTN, Hyperlipidemia, GERD, anemia, TBI, splenectomy, skin CA s/p excision and radiation, Hep C (treated), ex-smoker quit 50 years ago, systolic CHF, MR/TR/AS, pulmonary htn, orthostatic hypotension, thoracic compression fracture, and CVA whom was BIBA to [facility] after a fall at home admitted for:      #Left hip fracture       s/p left hip surgery, doing well, had PT today, walked with wife    #Chronic systolic CHF - mild exacerbation  but stable            2+ pitting edema of both LE so will order comp stockings           cont Torsemide 20mg q24  and Spiromolactone 25 q24     #moderate pericardial effusion - 2/2 CKD likely   #valvular heart dz with pulmonary htn  - pt is only on Torsemide 20mg and Spironolactone 25mf po daily (both) and will cont same meds as they are     #ASHLEY  on CKD3  - Baseline Cr around 1.8 now 2.3   for the last year or so  appreciate Renal input   certainly a BR US reasonable         #anemia, 2/2 ACD and CKD  - H/H stable   - Iron panel noted, will start on iron supplements   - B12/Folate WNL     #orthostatic hypotension  -c/w florinef    #compression fractures lumbar and thoracic  - no acute back pain  - will need TLSO brace after surgery/when ambulating    #CVA hx  - c/w Lipitor and asa  - hold Plavix given ortho trauma       VTE PPX: SQ heparin   Dispo: PT consult in am and prepare for STR  d/w pts wife bedside     PLAN - PT consult in am and plan for d/c   Name: ÁLVARO SELLERS  Age: 84y  Gender: Male    Pt was seen and examined. s/p L hip surgery  c/o:  feels good he says   walked with PT he says     Allergies:  No Known Allergies      PHYSICAL EXAM:    Vitals:  T(C): 36.6 (04-27-25 @ 22:10), Max: 36.6 (04-27-25 @ 04:47)  HR: 74 (04-27-25 @ 22:10) (74 - 87)  BP: 114/74 (04-27-25 @ 22:10) (114/74 - 131/85)  RR: 16 (04-27-25 @ 14:07) (16 - 16)  SpO2: 97% (04-27-25 @ 22:10) (97% - 97%)  Wt(kg): --Vital Signs Last 24 Hrs  T(C): 36.6 (27 Apr 2025 22:10), Max: 36.6 (27 Apr 2025 04:47)  T(F): 97.8 (27 Apr 2025 22:10), Max: 97.9 (27 Apr 2025 04:47)  HR: 74 (27 Apr 2025 22:10) (74 - 87)  BP: 114/74 (27 Apr 2025 22:10) (114/74 - 131/85)  BP(mean): --  RR: 16 (27 Apr 2025 14:07) (16 - 16)  SpO2: 97% (27 Apr 2025 22:10) (97% - 97%)          NECK: Supple, No JVD  CHEST/LUNG: CTA, B/L, No rales, rhonchi, wheezing, or rubs  HEART: S1,S2, N1 Regular rate and rhythm; No murmurs, rubs, or gallops  ABDOMEN: Soft, Nontender, Nondistended; Bowel sounds present  EXTREMITIES:  2+ Peripheral Pulses, No clubbing, cyanosis, or edema      LABS:                        10.2   6.43  )-----------( 154      ( 27 Apr 2025 09:27 )             31.5     04-27    138  |  95[L]  |  43[H]  ----------------------------<  98  3.5   |  30  |  2.3[H]    Ca    8.3[L]      27 Apr 2025 09:27  Phos  3.4     04-27  Mg     2.2     04-27    TPro  6.5  /  Alb  3.1[L]  /  TBili  1.0  /  DBili  x   /  AST  21  /  ALT  <5  /  AlkPhos  88  04-27    LIVER FUNCTIONS - ( 27 Apr 2025 09:27 )  Alb: 3.1 g/dL / Pro: 6.5 g/dL / ALK PHOS: 88 U/L / ALT: <5 U/L / AST: 21 U/L / GGT: x                 MEDICATIONS  (STANDING):  aspirin enteric coated 81 milliGRAM(s) Oral daily  atorvastatin 80 milliGRAM(s) Oral at bedtime  clopidogrel Tablet 75 milliGRAM(s) Oral daily  colchicine 0.6 milliGRAM(s) Oral daily  escitalopram 20 milliGRAM(s) Oral daily  ferrous    sulfate 325 milliGRAM(s) Oral daily  fludroCORTISONE 0.1 milliGRAM(s) Oral daily  furosemide   Injectable 40 milliGRAM(s) IV Push two times a day  heparin   Injectable 5000 Unit(s) SubCutaneous every 8 hours  mirtazapine 15 milliGRAM(s) Oral at bedtime  pantoprazole    Tablet 40 milliGRAM(s) Oral before breakfast  spironolactone 25 milliGRAM(s) Oral daily  tamsulosin 0.4 milliGRAM(s) Oral at bedtime  torsemide 20 milliGRAM(s) Oral daily        RADIOLOGY & ADDITIONAL TESTS:    Imaging Personally Reviewed:  [ ] YES  [ ] NO    A/P:  Pt is a 83yo M with a pmhx of HTN, Hyperlipidemia, GERD, anemia, TBI, splenectomy, skin CA s/p excision and radiation, Hep C (treated), ex-smoker quit 50 years ago, systolic CHF, MR/TR/AS, pulmonary htn, orthostatic hypotension, thoracic compression fracture, and CVA whom was BIBA to [facility] after a fall at home admitted for:      #Left hip fracture       s/p left hip surgery, doing well, had PT today, walked with wife    #Chronic systolic CHF - mild exacerbation  but stable            2+ pitting edema of both LE so will order comp stockings           cont Torsemide 20mg q24  and Spiromolactone 25 q24     #moderate pericardial effusion - 2/2 CKD likely   #valvular heart dz with pulmonary htn  - pt is only on Torsemide 20mg and Spironolactone 25mf po daily (both) and will cont same meds as they are     #ASHLEY  on CKD3  - Baseline Cr around 1.8 now 2.3   for the last year or so  appreciate Renal input     Bladder Kidney US noted - nothing acute - no inpt w/u        #anemia, 2/2 ACD and CKD  - H/H stable   - Iron panel noted, will start on iron supplements   - B12/Folate WNL     #orthostatic hypotension  -c/w florinef    #compression fractures lumbar and thoracic  - no acute back pain  - will need TLSO brace after surgery/when ambulating    #CVA hx  - c/w Lipitor and asa  - hold Plavix given ortho trauma       VTE PPX: SQ heparin   Dispo: PT consult in am and prepare for STR  d/w pts wife bedside     PLAN - PT consult in am and plan for d/c

## 2025-04-27 NOTE — PROGRESS NOTE ADULT - SUBJECTIVE AND OBJECTIVE BOX
Nephrology progress note    Patient was seen and examined, events over the last 24 h noted .    Allergies:  No Known Allergies    Hospital Medications:   MEDICATIONS  (STANDING):  aspirin enteric coated 81 milliGRAM(s) Oral daily  atorvastatin 80 milliGRAM(s) Oral at bedtime  clopidogrel Tablet 75 milliGRAM(s) Oral daily  colchicine 0.6 milliGRAM(s) Oral daily  escitalopram 20 milliGRAM(s) Oral daily  ferrous    sulfate 325 milliGRAM(s) Oral daily  fludroCORTISONE 0.1 milliGRAM(s) Oral daily  furosemide   Injectable 40 milliGRAM(s) IV Push two times a day  heparin   Injectable 5000 Unit(s) SubCutaneous every 8 hours  mirtazapine 15 milliGRAM(s) Oral at bedtime  pantoprazole    Tablet 40 milliGRAM(s) Oral before breakfast  spironolactone 25 milliGRAM(s) Oral daily  tamsulosin 0.4 milliGRAM(s) Oral at bedtime  torsemide 20 milliGRAM(s) Oral daily        VITALS:  T(F): 97.9 (04-27-25 @ 14:07), Max: 98.2 (04-26-25 @ 21:31)  HR: 87 (04-27-25 @ 14:07)  BP: 131/85 (04-27-25 @ 14:07)  RR: 16 (04-27-25 @ 14:07)  SpO2: 97% (04-27-25 @ 14:07)  Wt(kg): --    04-25 @ 07:01  -  04-26 @ 07:00  --------------------------------------------------------  IN: 450 mL / OUT: 0 mL / NET: 450 mL    04-27 @ 07:01  -  04-27 @ 15:56  --------------------------------------------------------  IN: 0 mL / OUT: 800 mL / NET: -800 mL          PHYSICAL EXAM:  Constitutional: NAD  HEENT: anicteric sclera, oropharynx clear, MMM  Neck: No JVD  Respiratory: CTAB, no wheezes, rales or rhonchi  Cardiovascular: S1, S2, RRR  Gastrointestinal: BS+, soft, NT/ND  Extremities: No cyanosis or clubbing. No peripheral edema  :  No michelle.   Skin: No rashes    LABS:  04-27    138  |  95[L]  |  43[H]  ----------------------------<  98  3.5   |  30  |  2.3[H]    Ca    8.3[L]      27 Apr 2025 09:27  Phos  3.4     04-27  Mg     2.2     04-27    TPro  6.5  /  Alb  3.1[L]  /  TBili  1.0  /  DBili      /  AST  21  /  ALT  <5  /  AlkPhos  88  04-27                          10.2   6.43  )-----------( 154      ( 27 Apr 2025 09:27 )             31.5       Urine Studies:  Urinalysis Basic - ( 27 Apr 2025 09:27 )    Color:  / Appearance:  / SG:  / pH:   Gluc: 98 mg/dL / Ketone:   / Bili:  / Urobili:    Blood:  / Protein:  / Nitrite:    Leuk Esterase:  / RBC:  / WBC    Sq Epi:  / Non Sq Epi:  / Bacteria:         RADIOLOGY & ADDITIONAL STUDIES:

## 2025-04-27 NOTE — PROGRESS NOTE ADULT - ASSESSMENT
85yo M with a pmhx of HTN, Hyperlipidemia, GERD, anemia, TBI, splenectomy, skin CA s/p excision and radiation, Hep C (treated), ex-smoker quit 50 years ago, systolic CHF, MR/TR/AS, pulmonary htn, orthostatic hypotension, thoracic compression fracture, and CVA whom was BIBA to Ed after a fall at home.  Patient states that he was standing from chair and walker rolled forward and he fell landing on left side. Patient denies head injury no loss of consciousness. Patient states that since having pain in left hip and couldn't stand. Patient denies any chest pain or sob.     #  Left hip fracture  #CKD3 - Rt hydro - improving , Bladder volume 540 cc  -creatinine baseline around 1.8 now is ~2 creatinine is 2 , about baseline   -received 500cc LR in ED no more IVF - in view of CHF and pHTN, AS  -repeat Bladder and Kidney sono to assure lack of retention and Rt hydro  -place a condom catheter for UO   - pt is  not on IVF , no Sauer  - no LE edema, on NC  - pt is on lasix 40 mg IV q12 and Torsemide 20 mg qd - would hold off Torsemide  Monitor BP to avoid hypotension on high dose of diuretics  creat rising b/o IV Lasix  -check 2D Echo for IVC size - if dilated, cont IV Lasix, but if not  - would stop IV lasix and cont Torsemide 40 or 20 qd  -chek pericardial effusion  moderate on admission    #anemia, likely of chronic dz,  -check iron, tibc, ferritin, b12, folate  -monitor cbc    #Chronic systolic CHF  #moderate pericardial effusion - 2/2 CKD likely   #valvular heart dz with pulmonary htn  -start colchicine 0.6mg daily  -on  torsemide and spironolactone    #orthostatic hypotension -c/w florinef    will follow

## 2025-04-27 NOTE — PROGRESS NOTE ADULT - PROVIDER SPECIALTY LIST ADULT
Cardiology Preventive
Cardiology Preventive
Nephrology
Cardiology Preventive
Internal Medicine
Palliative Care

## 2025-04-28 ENCOUNTER — TRANSCRIPTION ENCOUNTER (OUTPATIENT)
Age: 85
End: 2025-04-28

## 2025-04-28 VITALS
HEART RATE: 75 BPM | RESPIRATION RATE: 18 BRPM | DIASTOLIC BLOOD PRESSURE: 68 MMHG | OXYGEN SATURATION: 98 % | TEMPERATURE: 98 F | SYSTOLIC BLOOD PRESSURE: 109 MMHG

## 2025-04-28 LAB
ALBUMIN SERPL ELPH-MCNC: 3.1 G/DL — LOW (ref 3.5–5.2)
ALP SERPL-CCNC: 102 U/L — SIGNIFICANT CHANGE UP (ref 30–115)
ALT FLD-CCNC: <5 U/L — SIGNIFICANT CHANGE UP (ref 0–41)
ANION GAP SERPL CALC-SCNC: 12 MMOL/L — SIGNIFICANT CHANGE UP (ref 7–14)
AST SERPL-CCNC: 27 U/L — SIGNIFICANT CHANGE UP (ref 0–41)
BASOPHILS # BLD AUTO: 0.02 K/UL — SIGNIFICANT CHANGE UP (ref 0–0.2)
BASOPHILS NFR BLD AUTO: 0.3 % — SIGNIFICANT CHANGE UP (ref 0–1)
BILIRUB SERPL-MCNC: 1.2 MG/DL — SIGNIFICANT CHANGE UP (ref 0.2–1.2)
BUN SERPL-MCNC: 42 MG/DL — HIGH (ref 10–20)
CALCIUM SERPL-MCNC: 8.3 MG/DL — LOW (ref 8.4–10.5)
CHLORIDE SERPL-SCNC: 94 MMOL/L — LOW (ref 98–110)
CO2 SERPL-SCNC: 29 MMOL/L — SIGNIFICANT CHANGE UP (ref 17–32)
CREAT SERPL-MCNC: 2.1 MG/DL — HIGH (ref 0.7–1.5)
EGFR: 30 ML/MIN/1.73M2 — LOW
EGFR: 30 ML/MIN/1.73M2 — LOW
EOSINOPHIL # BLD AUTO: 0.18 K/UL — SIGNIFICANT CHANGE UP (ref 0–0.7)
EOSINOPHIL NFR BLD AUTO: 2.7 % — SIGNIFICANT CHANGE UP (ref 0–8)
GLUCOSE SERPL-MCNC: 77 MG/DL — SIGNIFICANT CHANGE UP (ref 70–99)
HCT VFR BLD CALC: 33.1 % — LOW (ref 42–52)
HGB BLD-MCNC: 11 G/DL — LOW (ref 14–18)
IMM GRANULOCYTES NFR BLD AUTO: 0.3 % — SIGNIFICANT CHANGE UP (ref 0.1–0.3)
LYMPHOCYTES # BLD AUTO: 1.17 K/UL — LOW (ref 1.2–3.4)
LYMPHOCYTES # BLD AUTO: 17.4 % — LOW (ref 20.5–51.1)
MAGNESIUM SERPL-MCNC: 2 MG/DL — SIGNIFICANT CHANGE UP (ref 1.8–2.4)
MCHC RBC-ENTMCNC: 32.7 PG — HIGH (ref 27–31)
MCHC RBC-ENTMCNC: 33.2 G/DL — SIGNIFICANT CHANGE UP (ref 32–37)
MCV RBC AUTO: 98.5 FL — HIGH (ref 80–94)
MONOCYTES # BLD AUTO: 0.63 K/UL — HIGH (ref 0.1–0.6)
MONOCYTES NFR BLD AUTO: 9.4 % — HIGH (ref 1.7–9.3)
NEUTROPHILS # BLD AUTO: 4.69 K/UL — SIGNIFICANT CHANGE UP (ref 1.4–6.5)
NEUTROPHILS NFR BLD AUTO: 69.9 % — SIGNIFICANT CHANGE UP (ref 42.2–75.2)
NRBC BLD AUTO-RTO: 0 /100 WBCS — SIGNIFICANT CHANGE UP (ref 0–0)
PHOSPHATE SERPL-MCNC: 3 MG/DL — SIGNIFICANT CHANGE UP (ref 2.1–4.9)
PLATELET # BLD AUTO: 173 K/UL — SIGNIFICANT CHANGE UP (ref 130–400)
PMV BLD: 13.9 FL — HIGH (ref 7.4–10.4)
POTASSIUM SERPL-MCNC: 3.4 MMOL/L — LOW (ref 3.5–5)
POTASSIUM SERPL-SCNC: 3.4 MMOL/L — LOW (ref 3.5–5)
PROT SERPL-MCNC: 6.7 G/DL — SIGNIFICANT CHANGE UP (ref 6–8)
RBC # BLD: 3.36 M/UL — LOW (ref 4.7–6.1)
RBC # FLD: 16.5 % — HIGH (ref 11.5–14.5)
SODIUM SERPL-SCNC: 135 MMOL/L — SIGNIFICANT CHANGE UP (ref 135–146)
WBC # BLD: 6.71 K/UL — SIGNIFICANT CHANGE UP (ref 4.8–10.8)
WBC # FLD AUTO: 6.71 K/UL — SIGNIFICANT CHANGE UP (ref 4.8–10.8)

## 2025-04-28 RX ORDER — MUPIROCIN CALCIUM 20 MG/G
1 CREAM TOPICAL
Refills: 0 | Status: DISCONTINUED | OUTPATIENT
Start: 2025-04-28 | End: 2025-04-28

## 2025-04-28 RX ADMIN — Medication 81 MILLIGRAM(S): at 08:29

## 2025-04-28 RX ADMIN — Medication 20 MILLIEQUIVALENT(S): at 11:28

## 2025-04-28 RX ADMIN — Medication 0.1 MILLIGRAM(S): at 05:24

## 2025-04-28 RX ADMIN — ESCITALOPRAM OXALATE 20 MILLIGRAM(S): 20 TABLET ORAL at 08:31

## 2025-04-28 RX ADMIN — Medication 25 MILLIGRAM(S): at 05:24

## 2025-04-28 RX ADMIN — Medication 325 MILLIGRAM(S): at 08:30

## 2025-04-28 RX ADMIN — Medication 20 MILLIEQUIVALENT(S): at 15:46

## 2025-04-28 RX ADMIN — MUPIROCIN CALCIUM 1 APPLICATION(S): 20 CREAM TOPICAL at 17:06

## 2025-04-28 RX ADMIN — HEPARIN SODIUM 5000 UNIT(S): 1000 INJECTION INTRAVENOUS; SUBCUTANEOUS at 15:46

## 2025-04-28 RX ADMIN — Medication 40 MILLIGRAM(S): at 05:24

## 2025-04-28 RX ADMIN — HEPARIN SODIUM 5000 UNIT(S): 1000 INJECTION INTRAVENOUS; SUBCUTANEOUS at 05:24

## 2025-04-28 RX ADMIN — CLOPIDOGREL BISULFATE 75 MILLIGRAM(S): 75 TABLET, FILM COATED ORAL at 08:29

## 2025-04-28 RX ADMIN — Medication 20 MILLIGRAM(S): at 05:24

## 2025-04-28 RX ADMIN — COLCHICINE 0.6 MILLIGRAM(S): 0.6 TABLET, FILM COATED ORAL at 08:31

## 2025-04-28 NOTE — DISCHARGE NOTE PROVIDER - CARE PROVIDER_API CALL
ÁLVARO CHENEY  [address] [place] [address]  Phone: [phone]  Fax: [phone]  Follow Up Time: 1 week   eDion Enriquez  Internal Medicine  [address] [place] [address]  Phone: [phone]  Fax: [phone]  Established Patient  Follow Up Time: 1-3 days    Hip-Fredo Collins  Orthopaedic Surgery  [address] [place] [id number]  Phone: [phone]  Fax: [phone]  Established Patient  Follow Up Time: 1 week

## 2025-04-28 NOTE — DISCHARGE NOTE PROVIDER - NSDCMRMEDTOKEN_GEN_ALL_CORE_FT
Aspirin Low Dose 81 mg oral delayed release tablet: 1 tab(s) orally once a day  clopidogrel 75 mg oral tablet: 1 tab(s) orally once a day  Flomax 0.4 mg oral capsule: 1 cap(s) orally once a day (at bedtime)  Florinef Acetate 0.1 mg oral tablet: 1 tab(s) orally once a day  Lexapro 20 mg oral tablet: 1 tab(s) orally once a day  Lipitor 80 mg oral tablet: 1 tab(s) orally once a day (at bedtime)  mirtazapine 15 mg oral tablet: 1 tab(s) orally once a day (at bedtime)  pantoprazole 40 mg oral delayed release tablet: 1 tab(s) orally once a day  spironolactone 25 mg oral tablet: 1 tab(s) orally once a day  torsemide 20 mg oral tablet: 1 tab(s) orally once a day

## 2025-04-28 NOTE — DISCHARGE NOTE PROVIDER - ATTENDING ATTESTATION STATEMENT
[facility] PCP list provided to patient per his request.    No additional needs identified.     THEO Osorio, LSW  P: [phone]  [address]: [phone], Pager: [phone] I have personally seen and examined the patient. I have collaborated with and supervised the

## 2025-04-28 NOTE — CHART NOTE - NSCHARTNOTEFT_GEN_A_CORE
Informed by Dr. Enriquez that pt clinically stable at this time and no longer requires acute inpt care in hospital  Patient seen and examined at the bedside today. Pt appearing comfortable and in NAD on physical exam.   DC note and med rec completed per d/w Dr. Enriquez  Please refer to discharge summary for further information.
Chart reviewed  patient is not cleared for surgery today - will schedule for tomorrow pending clearance by primary team.   npo after midnight
Patient seen and assessed on AM round this morning. Fatigued appearing, breathing comfortably on 3L NC. Does report some SOB.     On exam has 3+ pitting edema to lower extremities, crackles at lung bases. Labs reviewed and BNP notable elevated to 25k from 8k previously. TTE done yesterday with worsened EF to 25%.    A/P   - Would hold off on OR today as patient on 3L NC, and grossly fluid overloaded  - STart IV lasix 40mg BID  - Monitor strict I/O's as patient may need more diuresis   - Bedrest for now   - Wean O2 as able

## 2025-04-28 NOTE — DISCHARGE NOTE NURSING/CASE MANAGEMENT/SOCIAL WORK - PATIENT PORTAL LINK FT
You can access the FollowMyHealth Patient Portal offered by [facility] by registering at the following website: [email or web address]. By joining [facility]’s FollowMyHealth portal, you will also be able to view your health information using other applications (apps) compatible with our system.

## 2025-04-28 NOTE — DISCHARGE NOTE PROVIDER - NSDCFUADDAPPT_GEN_ALL_CORE_FT
** Please follow up with your primary care doctor in 1-3 days for post-hospitalization medical follow-up**      ** Please call Dr. Ricardo Collins's office to schedule an appointment to be seen in office in 3 weeks from the date of surgery for post-hospitalization follow-up**

## 2025-04-28 NOTE — DISCHARGE NOTE NURSING/CASE MANAGEMENT/SOCIAL WORK - NSDCVIVACCINE_GEN_ALL_CORE_FT
Tdap; 09-Nov-2024 20:08; Cintia Conroy (RN); [employer]; [id number] (Exp. Date: 30-May-2026); IntraMuscular; Deltoid Left.; 0.5 milliLiter(s); VIS (VIS Published: 09-May-2013, VIS Presented: 09-Nov-2024);

## 2025-04-28 NOTE — DISCHARGE NOTE PROVIDER - NSDCCPCAREPLAN_GEN_ALL_CORE_FT
PRINCIPAL DISCHARGE DIAGNOSIS  Diagnosis: Hip fracture  Assessment and Plan of Treatment: You were admitted to the hospital for a hip fracture and underwent a surgery for treatment. You were evaluated by physical therapy and occupational therapy, both reccomended rehab upom discharge. You should follow up with your primary care provider and continue with your home medication.  If symptoms should worsen, go to the ED

## 2025-04-28 NOTE — DISCHARGE NOTE PROVIDER - HOSPITAL COURSE
Pt is a 85yo M with a pmhx of HTN, Hyperlipidemia, GERD, anemia, TBI, splenectomy, skin CA s/p excision and radiation, Hep C (treated), ex-smoker quit 50 years ago, systolic CHF, MR/TR/AS, pulmonary htn, orthostatic hypotension, thoracic compression fracture, and CVA whom was BIBA to [facility] after a fall at home admitted for:      #Left hip fracture  - ortho consult, appreciated   - s/p closed reduction and percutaneous pinning of left hip on 4/25/25  - administered Dilaudid 0.2mg IV Q4 PRN mod-severe pain  - PT evaluation, recommend rehab facility   - OT evaluation, recommend rehab facility     #Chronic systolic CHF - mild exacerbation  but stable   #moderate pericardial effusion - 2/2 CKD likely   #valvular heart dz with pulmonary htn  - Started back Torsemide 20mg on 4/25 and Spironolactone 25mg continued on  - IV Lasix 40mg IV Push BID x 3 days   - PO metolazone 10mg x 1 per Dr. Enriquez   - FU repeat CXR  - Monitor I&Os   - Daily weight  - resume colchicine 0.6mg daily  - Home torsemide held, resume aldactone     #Acute on CKD3  - Baseline Cr unknown, Cr 12/2024 0.9  - Cr 2.2, trending up  - s/p IVF - 500CC in ED   - Will hold IVF for now given CHF exacerbation   - Renal / Bladder US noted   - FU urine lytes   - Nephrology consult     #anemia, 2/2 ACD  - H/H stable   - Iron panel noted, will start on iron supplements   - B12/Folate WNL     #orthostatic hypotension  -c/w florinef    #compression fractures lumbar and thoracic  - no acute back pain  - will need TLSO brace after surgery/when ambulating    #CVA hx  - c/w Lipitor and asa  - hold Plavix given ortho trauma      Pt is a 85yo M with a pmhx of HTN, Hyperlipidemia, GERD, anemia, TBI, splenectomy, skin CA s/p excision and radiation, Hep C (treated), ex-smoker quit 50 years ago, systolic CHF, MR/TR/AS, pulmonary htn, orthostatic hypotension, thoracic compression fracture, and CVA whom was BIBA to Ed after a fall at home admitted for:      #Left hip fracture  - Ortho consult, appreciated   - s/p closed reduction and percutaneous pinning of left hip on 4/25/25  - Administered Dilaudid 0.2mg IV Q4 PRN mod-severe pain  - PT evaluation, recommend rehab facility   - OT evaluation, recommend rehab facility     #Chronic systolic CHF   #moderate pericardial effusion - 2/2 CKD likely   #valvular heart dz with pulmonary htn  - Resumed Torsemide 20mg and Spironolactone 25mg  - Administered IV Lasix 40mg IV Push BID x 3 days   - Given PO metolazone 10mg x 1 per Dr. Enriquez   -  repeat CXR: Left lower lobe opacity/pleural effusion unchanged. No air leak.  - Monitored I&Os   - Daily weight  - Resumed colchicine 0.6mg daily    #Acute on CKD3  - Baseline Cr unknown, Cr 12/2024 0.9  - Trended Cr, 2.1 to date   - s/p IVF - 500CC in ED   - Held IVF given CHF exacerbation   - Renal / Bladder US noted   - Nephrology consult, noted     #anemia, 2/2 ACD  - H/H stable   - Iron panel noted, started on iron supplements   - B12/Folate WNL     #orthostatic hypotension  - Continued on Florinef 0.1mg     #compression fractures lumbar and thoracic  - No acute back pain  - will need TLSO brace after surgery/when ambulating    #CVA hx  - Continued on Lipitor and aspirin   - Resumed Plavix 75mg     Patient is medically stable for discharge.      Pt is a 83yo M with a pmhx of HTN, Hyperlipidemia, GERD, anemia, TBI, splenectomy, skin CA s/p excision and radiation, Hep C (treated), ex-smoker quit 50 years ago, systolic CHF, MR/TR/AS, pulmonary htn, orthostatic hypotension, thoracic compression fracture, and CVA whom was BIBA to Ed after a fall at home admitted for:          ---  HOSPITAL COURSE:     #Left hip fracture  - Ortho consult, appreciated   - s/p closed reduction and percutaneous pinning of left hip on 4/25/25  - Administered Dilaudid 0.2mg IV Q4 PRN mod-severe pain  - PT evaluation, recommended rehab facility   - OT evaluation, recommended rehab facility     #Chronic systolic CHF   #moderate pericardial effusion - 2/2 CKD likely   #valvular heart dz with pulmonary htn  - Resumed Torsemide 20mg and Spironolactone 25mg  - Administered IV Lasix 40mg IV Push BID x 3 days   - Given PO metolazone 10mg x 1 per Dr. Enriquez   - FU repeat CXR: Left lower lobe opacity/pleural effusion unchanged. No air leak.  - Monitored I&Os   - Daily weights  - Resumed colchicine 0.6mg daily    #Acute on CKD3  - Baseline Cr unknown, Cr 12/2024 0.9  - Trended Cr, 2.1 to date   - s/p IVF - 500CC in ED   - Renal / Bladder US noted   - Nephrology consult - no acute intervention warranted     #anemia, 2/2 ACD  - H/H stable   - Iron panel noted, started on iron supplements   - B12/Folate WNL     #orthostatic hypotension  - Continued on Florinef 0.1mg     #compression fractures lumbar and thoracic  - No acute back pain  - will need TLSO brace after surgery/when ambulating    #CVA hx  - Continued on Lipitor and aspirin   - Resumed Plavix 75mg       Patient was medically optimized and improved clinically throughout hospital course.      Patient examined on day of discharge and found to be medically stable for discharge by Dr. Enriquez          with outpatient follow up.                    Vital Signs Last 24 Hrs  T(C): 36.8 (28 Apr 2025 14:04), Max: 36.8 (28 Apr 2025 14:04)  T(F): 98.3 (28 Apr 2025 14:04), Max: 98.3 (28 Apr 2025 14:04)  HR: 75 (28 Apr 2025 14:04) (68 - 75)  BP: 109/68 (28 Apr 2025 14:04) (109/68 - 126/82)  BP(mean): --  RR: 18 (28 Apr 2025 14:04) (18 - 18)  SpO2: 98% (28 Apr 2025 14:04) (97% - 98%)             Pt is a 85yo M with a pmhx of HTN, Hyperlipidemia, GERD, anemia, TBI, splenectomy, skin CA s/p excision and radiation, Hep C (treated), ex-smoker quit 50 years ago, systolic CHF, MR/TR/AS, pulmonary htn, orthostatic hypotension, thoracic compression fracture, and CVA whom was BIBA to Ed after a fall at home admitted for:          ---  HOSPITAL COURSE:     #Left hip fracture  - Ortho consulted  - s/p closed reduction and percutaneous pinning of left hip on 4/25/25    - PT evaluation, recommended rehab facility   - OT evaluation, recommended rehab facility     #Chronic systolic CHF   #moderate pericardial effusion - 2/2 CKD likely   #valvular heart dz with pulmonary htn  - Resumed Torsemide 20mg and Spironolactone 25mg  - Administered IV Lasix 40mg IV Push BID x 3 days   - Given PO metolazone 10mg x 1 per Dr. Enriquez   - FU repeat CXR: Left lower lobe opacity/pleural effusion unchanged. No air leak.  - Monitored I&Os   - Daily weights  - Resumed colchicine 0.6mg daily    #Acute on CKD3  - Baseline Cr unknown, Cr 12/2024 0.9  - Trended Cr, 2.1 to date   - s/p IVF - 500CC in ED   - Renal / Bladder US noted   - Nephrology consult - no acute intervention warranted     #anemia, 2/2 ACD  - H/H stable   - Iron panel noted, started on iron supplements   - B12/Folate WNL     #orthostatic hypotension  - Continued on Florinef 0.1mg     #compression fractures lumbar and thoracic  - No acute back pain  - per d/w Dr Enriquez, TSLO brace not medically warranted as pt able to ambulate and has  denied back pain through hosp course     #CVA hx  - Continued on Lipitor and aspirin   - Resumed Plavix 75mg       Patient was medically optimized and improved clinically throughout hospital course.      Patient examined on day of discharge and found to be medically stable for discharge by Dr. Enriquez          with outpatient follow up.                    Vital Signs Last 24 Hrs  T(C): 36.8 (28 Apr 2025 14:04), Max: 36.8 (28 Apr 2025 14:04)  T(F): 98.3 (28 Apr 2025 14:04), Max: 98.3 (28 Apr 2025 14:04)  HR: 75 (28 Apr 2025 14:04) (68 - 75)  BP: 109/68 (28 Apr 2025 14:04) (109/68 - 126/82)  BP(mean): --  RR: 18 (28 Apr 2025 14:04) (18 - 18)  SpO2: 98% (28 Apr 2025 14:04) (97% - 98%)

## 2025-04-28 NOTE — DISCHARGE NOTE PROVIDER - PROVIDER TOKENS
PROVIDER:[TOKEN:[[id number]],FOLLOWUP:[1 week]] PROVIDER:[TOKEN:[[id number]],FOLLOWUP:[1-3 days],ESTABLISHEDPATIENT:[T]],PROVIDER:[TOKEN:[[id number]],FOLLOWUP:[1 week],ESTABLISHEDPATIENT:[T]]

## 2025-04-28 NOTE — DISCHARGE NOTE NURSING/CASE MANAGEMENT/SOCIAL WORK - FINANCIAL ASSISTANCE
[facility] provides services at a reduced cost to those who are determined to be eligible through [facility]’s financial assistance program. Information regarding [facility]’s financial assistance program can be found by going to [email or web address] or by calling [phone].

## 2025-05-01 DIAGNOSIS — I36.1 NONRHEUMATIC TRICUSPID (VALVE) INSUFFICIENCY: ICD-10-CM

## 2025-05-01 DIAGNOSIS — E78.00 PURE HYPERCHOLESTEROLEMIA, UNSPECIFIED: ICD-10-CM

## 2025-05-01 DIAGNOSIS — Z86.73 PERSONAL HISTORY OF TRANSIENT ISCHEMIC ATTACK (TIA), AND CEREBRAL INFARCTION WITHOUT RESIDUAL DEFICITS: ICD-10-CM

## 2025-05-01 DIAGNOSIS — Z79.02 LONG TERM (CURRENT) USE OF ANTITHROMBOTICS/ANTIPLATELETS: ICD-10-CM

## 2025-05-01 DIAGNOSIS — Z79.82 LONG TERM (CURRENT) USE OF ASPIRIN: ICD-10-CM

## 2025-05-01 DIAGNOSIS — I27.20 PULMONARY HYPERTENSION, UNSPECIFIED: ICD-10-CM

## 2025-05-01 DIAGNOSIS — M48.55XA COLLAPSED VERTEBRA, NOT ELSEWHERE CLASSIFIED, THORACOLUMBAR REGION, INITIAL ENCOUNTER FOR FRACTURE: ICD-10-CM

## 2025-05-01 DIAGNOSIS — Z87.891 PERSONAL HISTORY OF NICOTINE DEPENDENCE: ICD-10-CM

## 2025-05-01 DIAGNOSIS — D63.8 ANEMIA IN OTHER CHRONIC DISEASES CLASSIFIED ELSEWHERE: ICD-10-CM
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
## 2025-05-01 DIAGNOSIS — N17.9 ACUTE KIDNEY FAILURE, UNSPECIFIED: ICD-10-CM

## 2025-05-01 DIAGNOSIS — I08.1 RHEUMATIC DISORDERS OF BOTH MITRAL AND TRICUSPID VALVES: ICD-10-CM

## 2025-05-01 DIAGNOSIS — I31.39 OTHER PERICARDIAL EFFUSION (NONINFLAMMATORY): ICD-10-CM

## 2025-05-01 DIAGNOSIS — Z85.828 PERSONAL HISTORY OF OTHER MALIGNANT NEOPLASM OF SKIN: ICD-10-CM

## 2025-05-01 DIAGNOSIS — Z86.19 PERSONAL HISTORY OF OTHER INFECTIOUS AND PARASITIC DISEASES: ICD-10-CM

## 2025-05-01 DIAGNOSIS — S72.002A FRACTURE OF UNSPECIFIED PART OF NECK OF LEFT FEMUR, INITIAL ENCOUNTER FOR CLOSED FRACTURE: ICD-10-CM
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
## 2025-05-01 DIAGNOSIS — N18.30 CHRONIC KIDNEY DISEASE, STAGE 3 UNSPECIFIED: ICD-10-CM

## 2025-05-01 DIAGNOSIS — K21.9 GASTRO-ESOPHAGEAL REFLUX DISEASE WITHOUT ESOPHAGITIS: ICD-10-CM

## 2025-05-01 DIAGNOSIS — Z90.81 ACQUIRED ABSENCE OF SPLEEN: ICD-10-CM

## 2025-05-01 DIAGNOSIS — I50.23 ACUTE ON CHRONIC SYSTOLIC (CONGESTIVE) HEART FAILURE: ICD-10-CM

## 2025-05-01 DIAGNOSIS — N40.0 BENIGN PROSTATIC HYPERPLASIA WITHOUT LOWER URINARY TRACT SYMPTOMS: ICD-10-CM

## 2025-05-01 DIAGNOSIS — D63.1 ANEMIA IN CHRONIC KIDNEY DISEASE: ICD-10-CM

## 2025-05-01 DIAGNOSIS — F32.A DEPRESSION, UNSPECIFIED: ICD-10-CM

## 2025-05-01 DIAGNOSIS — I95.1 ORTHOSTATIC HYPOTENSION: ICD-10-CM

## 2025-05-01 PROBLEM — I50.21 ACUTE SYSTOLIC (CONGESTIVE) HEART FAILURE: Chronic | Status: ACTIVE | Noted: 2025-04-23

## 2025-05-01 PROBLEM — Z86.79 PERSONAL HISTORY OF OTHER DISEASES OF THE CIRCULATORY SYSTEM: Chronic | Status: ACTIVE | Noted: 2025-04-23

## 2025-05-01 PROBLEM — I35.0 NONRHEUMATIC AORTIC (VALVE) STENOSIS: Chronic | Status: ACTIVE | Noted: 2025-04-23

## 2025-05-01 PROBLEM — I07.1 RHEUMATIC TRICUSPID INSUFFICIENCY: Chronic | Status: ACTIVE | Noted: 2025-04-23

## 2025-05-01 PROBLEM — I34.0 NONRHEUMATIC MITRAL (VALVE) INSUFFICIENCY: Chronic | Status: ACTIVE | Noted: 2025-04-23

## 2025-05-15 ENCOUNTER — APPOINTMENT (OUTPATIENT)
Facility: CLINIC | Age: 85
End: 2025-05-15
Payer: MEDICARE

## 2025-05-15 DIAGNOSIS — S72.002D FRACTURE OF UNSPECIFIED PART OF NECK OF LEFT FEMUR, SUBSEQUENT ENCOUNTER FOR CLOSED FRACTURE WITH ROUTINE HEALING: ICD-10-CM

## 2025-05-15 PROCEDURE — 73502 X-RAY EXAM HIP UNI 2-3 VIEWS: CPT

## 2025-05-15 PROCEDURE — 99024 POSTOP FOLLOW-UP VISIT: CPT

## 2025-05-16 PROBLEM — S72.002D CLOSED FRACTURE OF LEFT HIP WITH ROUTINE HEALING, SUBSEQUENT ENCOUNTER: Status: ACTIVE | Noted: 2025-05-16

## 2025-05-16 NOTE — ED ADULT TRIAGE NOTE - PAIN RATING/NUMBER SCALE (0-10): ACTIVITY
CHIEF COMPLAINT:     Chief Complaint   Patient presents with    Allergies     Itchy eyes, runny nose [id number]         HPI:     Jose Diego is a 7 year old male here with parents with complains of allergy symptoms. Has had for 1 month. Symptoms include: nasal congestion, clear rhinorrhea, itchy eyes, occasional clearing throat. Pt gets it every spring season.    Parents have been giving claritin 5mg (mom) and 10mg (dad), pataday daily which helps with the eyes.  However, congestion still persists.    Pt has not been treated for allergies in the past.    Has annual physical with Dr. Silva 5/19/25      Current Medications[1]   Past Medical History[2]   Past Surgical History[3]   Family History[4]   Short Social Hx on File[5]      REVIEW OF SYSTEMS:   GENERAL: feels well otherwise, no fever  SKIN: no rashes, no hives  EYES:denies blurred vision or double vision  HEENT:no hyposmia, no purulent rhinorrhea.  See HPI.  CHEST: no chest pains.  LUNGS: denies shortness of breath with exertion or rest.No wheezing, no cough.  CARDIOVASCULAR: denies chest pain on exertion  GI: no nausea or abdominal pain  NEURO: no sleeping issues      EXAM:   BP 92/62 (BP Location: Left arm, Patient Position: Sitting, Cuff Size: child)   Pulse 91   Temp 98.5 °F (36.9 °C) (Oral)   Resp 24   Ht 4' 4\" (1.321 m)   Wt 62 lb (28.1 kg)   SpO2 97%   BMI 16.12 kg/m²     Physical Exam  Vitals reviewed.   Constitutional:       General: He is active. He is not in acute distress.     Appearance: Normal appearance. He is not ill-appearing.   HENT:      Head: Normocephalic and atraumatic.      Right Ear: Tympanic membrane and ear canal normal. Tympanic membrane is not erythematous, retracted or bulging.      Left Ear: Tympanic membrane and ear canal normal. Tympanic membrane is not erythematous, retracted or bulging.      Nose: Congestion and rhinorrhea present. Rhinorrhea is clear.      Right Turbinates: Enlarged and pale.      Left Turbinates:  Pale.      Right Sinus: No maxillary sinus tenderness or frontal sinus tenderness.      Left Sinus: No maxillary sinus tenderness or frontal sinus tenderness.      Mouth/Throat:      Lips: Pink.      Mouth: Mucous membranes are moist.      Pharynx: Oropharynx is clear. Uvula midline. Postnasal drip present. No posterior oropharyngeal erythema.      Tonsils: No tonsillar exudate.   Eyes:      General: Lids are normal.      Extraocular Movements: Extraocular movements intact.      Conjunctiva/sclera: Conjunctivae normal.   Cardiovascular:      Rate and Rhythm: Normal rate and regular rhythm.      Heart sounds: Normal heart sounds. No murmur heard.  Pulmonary:      Effort: Pulmonary effort is normal.      Breath sounds: Normal breath sounds and air entry. No decreased breath sounds, wheezing, rhonchi or rales.   Abdominal:      General: Bowel sounds are normal.      Palpations: Abdomen is soft. There is no hepatomegaly or splenomegaly.      Tenderness: There is no abdominal tenderness.   Musculoskeletal:      Cervical back: Normal range of motion and neck supple.   Lymphadenopathy:      Cervical: No cervical adenopathy.   Skin:     General: Skin is warm and dry.      Findings: No rash.   Neurological:      General: No focal deficit present.      Mental Status: He is alert and oriented for age.   Psychiatric:         Speech: Speech normal.         Behavior: Behavior normal. Behavior is cooperative.           ASSESSMENT:   Jose Diego is a 7 year old male who presents with   Encounter Diagnosis   Name Primary?    Seasonal allergic rhinitis due to other allergic trigger Yes   .      PLAN:   Comfort care as listed in patient instructions.  Allergens avoidance discussed with patient.   Nasal saline rinse daily. Gargles.  Change out of clothes immediately after coming from outside playing.  Increase claritin to 10mg daily.    Follow up with PCP for possible allergist referral or further management of  symptoms.  Medications below.    Meds & Refills for this Visit:  Requested Prescriptions     Signed Prescriptions Disp Refills    fluticasone propionate 50 MCG/ACT Nasal Suspension 1 each 0     Si spray by Each Nare route nightly. Point up and out.       Risks, benefits, side effects of medication explained and discussed.   The patient is asked to follow-up if sx's persist or worsen.  The patient indicates understanding of these issues and agrees to the plan.    Patient Instructions   Start daily antihistamine: Claritin or Zyrtec, 10mg. Generic is fine.  Take it daily before and during allergy season to help to keep symptoms from getting bad.  Best to start taking 1-2 weeks before allergy season starts.  Children's Pseudoephedrine may be taken for congestion as last resort.  Do not take at night, will keep you awake.  (CAUTION - do not take if you have keith blood pressure, this can raise BP)  Pataday eye drops as directed for itchy eyes, if needed.  Avoid allergy triggers.  Consider saline nasal spray or saline rinse for preventative measures.  Follow up if symptoms worsen or new onset of fever    What Causes Springtime Allergies?   Spring allergies are a result of pollen from trees, which can start pollinating anytime from January to April, depending on the climate and location. Trees that are known to cause severe allergies include oak, olive, elm, birch, bibiana, hickory, poplar, sycamore, maple, cypress, and walnut. In some areas of the world, some weeds will also pollinate in the springtime.   What Causes Summertime Allergies?   Grass pollen is typically the main cause of late spring and early summer allergies. Grass pollen is highest at these times. However, grass may cause allergies through much of the year if someone is mowing the lawn or lying in the grass. Contact with grass can result in itching and hives in people who are allergic to grass pollen, this is called contact urticaria.   Grasses can be  divided into two major classes, northern and southern grasses. Northern grasses are common in colder climates, and include saul, rye, orchard, sweet vernal, red top, and blue grasses. Southern grasses are present in warmer climates, with [place] grass being the major grass in this category.   What Causes Fall Allergies?   Weed pollen is the main cause of seasonal allergy in the late summer and early fall. Depending on the area of [place], these weeds include ragweed, sagebrush, pigweed, tumbleweed (Russian thistle), and cocklebur. In some areas of the world, some trees can pollinate in the fall as well.   How Do I Know What Pollens are Present?   In most areas, pollen is measured and counted, with the different types of pollen identified. This may be reported in terms of trees, weeds and grasses, or may be further divided into the types of trees and weeds identified. Specific grasses are not usually identified on pollen counts, as grasses look the same under a microscope.   How Do I Know Which Pollens I am Allergic To?   An allergist can help determine if you have seasonal allergies, and to which types of pollens to which you are allergic. This is accomplished through allergy testing, which typically involves skin testing or a blood test (RAST). Allergy testing can be helpful in predicting the times of the year that you are likely to experience allergy symptoms and is needed if you are interesting in taking allergy shots.   How Can I Avoid Pollen Exposure?   Unlike avoidance of pet dander and dust mites, it is more difficult to avoid exposure to pollens, since it is present in the outdoor air. Here are some tips to minimize pollen exposure:   Keep windows closed prevent pollens from drifting into your home   Minimize early morning activity when pollen is usually emitted between 5-10 a.m.   Keep your car windows closed when traveling.   Stay indoors when the pollen count is reported to be high, and on windy  days when pollen may be present in higher amounts in the air   Take a vacation during the height of the pollen season to a more pollen-free area, such as the beach or sea.   Avoid freshly cut grass and mowing the lawn   Machine dry bedding and clothing. Pollen may collect in laundry if it is hung outside to dry   Source: American Academy of Allergy, Asthma and Immunology.  http://www.aaaai.org/patients/publicedmat/tips/outdoorallergens.stm.   DISCLAIMER: The information contained in this article is for educational purposes only, and should not be used as a substitute for personal care by a licensed physician. Please see your physician for diagnosis and treatment of any concerning symptoms or medical condition.             [1]   No current outpatient medications on file.   [2]   Past Medical History:   Abnormal prenatal ultrasound    1. Possible pericarditis on prenatal u/s but normal fetal ECHO 2. One kidney - slightly enlarged collecting system     Closed supracondylar fracture of left elbow with routine healing, subsequent encounter    COVID-19    Oct '21    Development delay    Nursemaid's elbow of left upper extremity    Pelviectasis of kidney    US 2/10/18 - slight residual fullness R kidney but no hydronephrosis; no f/u needed   [3] No past surgical history on file.  [4]   Family History  Problem Relation Age of Onset    Hypertension Maternal Grandfather         Copied from mother's family history at birth    Diabetes Maternal Grandfather     Hypertension Maternal Grandmother         Copied from mother's family history at birth    Heart Disorder Neg     Cancer Neg    [5]   Social History  Socioeconomic History    Marital status: Single   Tobacco Use    Smoking status: Never     Passive exposure: Never    Smokeless tobacco: Never   Vaping Use    Vaping status: Never Used   Substance and Sexual Activity    Alcohol use: Never    Drug use: Never   Other Topics Concern    Second-hand smoke exposure No     Alcohol/drug concerns No    Violence concerns No      5 (moderate pain)

## 2025-06-03 ENCOUNTER — INPATIENT (INPATIENT)
Facility: HOSPITAL | Age: 85
LOS: 5 days | Discharge: HOME | DRG: 189 | End: 2025-06-09
Payer: MEDICARE

## 2025-06-03 VITALS
RESPIRATION RATE: 26 BRPM | SYSTOLIC BLOOD PRESSURE: 146 MMHG | WEIGHT: 179.02 LBS | OXYGEN SATURATION: 84 % | DIASTOLIC BLOOD PRESSURE: 87 MMHG | HEIGHT: 66 IN | HEART RATE: 58 BPM | TEMPERATURE: 95 F

## 2025-06-03 DIAGNOSIS — I13.0 HYPERTENSIVE HEART AND CHRONIC KIDNEY DISEASE WITH HEART FAILURE AND STAGE 1 THROUGH STAGE 4 CHRONIC KIDNEY DISEASE, OR UNSPECIFIED CHRONIC KIDNEY DISEASE: ICD-10-CM

## 2025-06-03 DIAGNOSIS — J81.1 CHRONIC PULMONARY EDEMA: ICD-10-CM

## 2025-06-03 DIAGNOSIS — Z87.828 PERSONAL HISTORY OF OTHER (HEALED) PHYSICAL INJURY AND TRAUMA: Chronic | ICD-10-CM

## 2025-06-03 DIAGNOSIS — D63.1 ANEMIA IN CHRONIC KIDNEY DISEASE: ICD-10-CM

## 2025-06-03 DIAGNOSIS — N18.9 CHRONIC KIDNEY DISEASE, UNSPECIFIED: ICD-10-CM

## 2025-06-03 DIAGNOSIS — K21.9 GASTRO-ESOPHAGEAL REFLUX DISEASE WITHOUT ESOPHAGITIS: ICD-10-CM

## 2025-06-03 DIAGNOSIS — Z85.828 PERSONAL HISTORY OF OTHER MALIGNANT NEOPLASM OF SKIN: Chronic | ICD-10-CM

## 2025-06-03 DIAGNOSIS — Z66 DO NOT RESUSCITATE: ICD-10-CM

## 2025-06-03 DIAGNOSIS — Z87.891 PERSONAL HISTORY OF NICOTINE DEPENDENCE: ICD-10-CM

## 2025-06-03 DIAGNOSIS — N17.9 ACUTE KIDNEY FAILURE, UNSPECIFIED: ICD-10-CM

## 2025-06-03 DIAGNOSIS — Z86.73 PERSONAL HISTORY OF TRANSIENT ISCHEMIC ATTACK (TIA), AND CEREBRAL INFARCTION WITHOUT RESIDUAL DEFICITS: ICD-10-CM

## 2025-06-03 DIAGNOSIS — I27.20 PULMONARY HYPERTENSION, UNSPECIFIED: ICD-10-CM

## 2025-06-03 DIAGNOSIS — E78.5 HYPERLIPIDEMIA, UNSPECIFIED: ICD-10-CM

## 2025-06-03 DIAGNOSIS — Z85.828 PERSONAL HISTORY OF OTHER MALIGNANT NEOPLASM OF SKIN: ICD-10-CM

## 2025-06-03 DIAGNOSIS — I50.22 CHRONIC SYSTOLIC (CONGESTIVE) HEART FAILURE: ICD-10-CM

## 2025-06-03 LAB
ALBUMIN SERPL ELPH-MCNC: 3.3 G/DL — LOW (ref 3.5–5.2)
ALP SERPL-CCNC: 117 U/L — HIGH (ref 30–115)
ALT FLD-CCNC: 90 U/L — HIGH (ref 0–41)
ANION GAP SERPL CALC-SCNC: 16 MMOL/L — HIGH (ref 7–14)
ANISOCYTOSIS BLD QL: SIGNIFICANT CHANGE UP
AST SERPL-CCNC: 77 U/L — HIGH (ref 0–41)
BASOPHILS # BLD AUTO: 0.02 K/UL — SIGNIFICANT CHANGE UP (ref 0–0.2)
BASOPHILS NFR BLD AUTO: 0.3 % — SIGNIFICANT CHANGE UP (ref 0–1)
BILIRUB SERPL-MCNC: 2.8 MG/DL — HIGH (ref 0.2–1.2)
BLD GP AB SCN SERPL QL: SIGNIFICANT CHANGE UP
BUN SERPL-MCNC: 62 MG/DL — CRITICAL HIGH (ref 10–20)
BURR CELLS BLD QL SMEAR: PRESENT — SIGNIFICANT CHANGE UP
CALCIUM SERPL-MCNC: 8.3 MG/DL — LOW (ref 8.4–10.5)
CHLORIDE SERPL-SCNC: 97 MMOL/L — LOW (ref 98–110)
CO2 SERPL-SCNC: 21 MMOL/L — SIGNIFICANT CHANGE UP (ref 17–32)
CREAT SERPL-MCNC: 3.2 MG/DL — HIGH (ref 0.7–1.5)
EGFR: 18 ML/MIN/1.73M2 — LOW
EGFR: 18 ML/MIN/1.73M2 — LOW
EOSINOPHIL # BLD AUTO: 0.08 K/UL — SIGNIFICANT CHANGE UP (ref 0–0.7)
EOSINOPHIL NFR BLD AUTO: 1.1 % — SIGNIFICANT CHANGE UP (ref 0–8)
FLUAV AG NPH QL: SIGNIFICANT CHANGE UP
FLUBV AG NPH QL: SIGNIFICANT CHANGE UP
GIANT PLATELETS BLD QL SMEAR: PRESENT — SIGNIFICANT CHANGE UP
GLUCOSE SERPL-MCNC: 116 MG/DL — HIGH (ref 70–99)
HCT VFR BLD CALC: 26.9 % — LOW (ref 42–52)
HGB BLD-MCNC: 8.6 G/DL — LOW (ref 14–18)
HYPOCHROMIA BLD QL: SLIGHT — SIGNIFICANT CHANGE UP
IMM GRANULOCYTES NFR BLD AUTO: 0.4 % — HIGH (ref 0.1–0.3)
LACTATE BLDV-MCNC: 3.8 MMOL/L — HIGH (ref 0.5–2)
LYMPHOCYTES # BLD AUTO: 0.77 K/UL — LOW (ref 1.2–3.4)
LYMPHOCYTES # BLD AUTO: 11 % — LOW (ref 20.5–51.1)
MACROCYTES BLD QL: SLIGHT — SIGNIFICANT CHANGE UP
MCHC RBC-ENTMCNC: 32 G/DL — SIGNIFICANT CHANGE UP (ref 32–37)
MCHC RBC-ENTMCNC: 34.4 PG — HIGH (ref 27–31)
MCV RBC AUTO: 107.6 FL — HIGH (ref 80–94)
MONOCYTES # BLD AUTO: 0.67 K/UL — HIGH (ref 0.1–0.6)
MONOCYTES NFR BLD AUTO: 9.5 % — HIGH (ref 1.7–9.3)
NEUTROPHILS # BLD AUTO: 5.46 K/UL — SIGNIFICANT CHANGE UP (ref 1.4–6.5)
NEUTROPHILS NFR BLD AUTO: 77.7 % — HIGH (ref 42.2–75.2)
NRBC BLD AUTO-RTO: 2 /100 WBCS — HIGH (ref 0–0)
NT-PROBNP SERPL-SCNC: HIGH PG/ML (ref 0–300)
OVALOCYTES BLD QL SMEAR: SLIGHT — SIGNIFICANT CHANGE UP
PLAT MORPH BLD: NORMAL — SIGNIFICANT CHANGE UP
PLATELET # BLD AUTO: 208 K/UL — SIGNIFICANT CHANGE UP (ref 130–400)
PLATELET COUNT - ESTIMATE: NORMAL — SIGNIFICANT CHANGE UP
PMV BLD: 13 FL — HIGH (ref 7.4–10.4)
POLYCHROMASIA BLD QL SMEAR: SLIGHT — SIGNIFICANT CHANGE UP
POTASSIUM SERPL-MCNC: 4.5 MMOL/L — SIGNIFICANT CHANGE UP (ref 3.5–5)
POTASSIUM SERPL-SCNC: 4.5 MMOL/L — SIGNIFICANT CHANGE UP (ref 3.5–5)
PROT SERPL-MCNC: 7.2 G/DL — SIGNIFICANT CHANGE UP (ref 6–8)
RBC # BLD: 2.5 M/UL — LOW (ref 4.7–6.1)
RBC # FLD: 23.6 % — HIGH (ref 11.5–14.5)
RBC BLD AUTO: ABNORMAL
RSV RNA NPH QL NAA+NON-PROBE: SIGNIFICANT CHANGE UP
SARS-COV-2 RNA SPEC QL NAA+PROBE: SIGNIFICANT CHANGE UP
SCHISTOCYTES BLD QL AUTO: SIGNIFICANT CHANGE UP
SODIUM SERPL-SCNC: 134 MMOL/L — LOW (ref 135–146)
SOURCE RESPIRATORY: SIGNIFICANT CHANGE UP
SPHEROCYTES BLD QL SMEAR: SLIGHT — SIGNIFICANT CHANGE UP
TARGETS BLD QL SMEAR: SLIGHT — SIGNIFICANT CHANGE UP
TROPONIN T, HIGH SENSITIVITY RESULT: 91 NG/L — CRITICAL HIGH (ref 6–21)
WBC # BLD: 7.03 K/UL — SIGNIFICANT CHANGE UP (ref 4.8–10.8)
WBC # FLD AUTO: 7.03 K/UL — SIGNIFICANT CHANGE UP (ref 4.8–10.8)

## 2025-06-03 PROCEDURE — 94640 AIRWAY INHALATION TREATMENT: CPT

## 2025-06-03 PROCEDURE — 93010 ELECTROCARDIOGRAM REPORT: CPT

## 2025-06-03 PROCEDURE — 80048 BASIC METABOLIC PNL TOTAL CA: CPT

## 2025-06-03 PROCEDURE — 36415 COLL VENOUS BLD VENIPUNCTURE: CPT

## 2025-06-03 PROCEDURE — 85027 COMPLETE CBC AUTOMATED: CPT

## 2025-06-03 PROCEDURE — 71045 X-RAY EXAM CHEST 1 VIEW: CPT | Mod: 26

## 2025-06-03 PROCEDURE — 99285 EMERGENCY DEPT VISIT HI MDM: CPT | Mod: FS

## 2025-06-03 PROCEDURE — 80053 COMPREHEN METABOLIC PANEL: CPT

## 2025-06-03 PROCEDURE — 83605 ASSAY OF LACTIC ACID: CPT

## 2025-06-03 PROCEDURE — 83880 ASSAY OF NATRIURETIC PEPTIDE: CPT

## 2025-06-03 RX ORDER — FUROSEMIDE 10 MG/ML
60 INJECTION INTRAMUSCULAR; INTRAVENOUS ONCE
Refills: 0 | Status: COMPLETED | OUTPATIENT
Start: 2025-06-03 | End: 2025-06-03

## 2025-06-03 RX ADMIN — FUROSEMIDE 60 MILLIGRAM(S): 10 INJECTION INTRAMUSCULAR; INTRAVENOUS at 19:54

## 2025-06-03 NOTE — H&P ADULT - NSHPPHYSICALEXAM_GEN_ALL_CORE
VITALS:   T(C): 35.7 (06-03-25 @ 23:15), Max: 35.7 (06-03-25 @ 23:15)  HR: 76 (06-03-25 @ 23:15) (58 - 87)  BP: 129/87 (06-03-25 @ 23:15) (123/65 - 146/87)  RR: 20 (06-03-25 @ 23:15) (20 - 26)  SpO2: 95% (06-03-25 @ 23:15) (84% - 96%)    GENERAL: NAD, lying in bed comfortably and pleasant  HEAD:  Atraumatic, Normocephalic  EYES: EOMI, conjunctiva and sclera clear  ENT: Moist mucous membranes  NECK: Supple, No JVD  CHEST/LUNG: CTA b/l;  Unlabored respirations  HEART: Regular rate and rhythm; No murmurs  ABDOMEN: BS present; Soft, Nontender, Nondistended  EXTREMITIES:  No clubbing, cyanosis, or edema  NERVOUS SYSTEM:  Alert & Oriented X3, speech clear. No deficits   MSK: FROM all 4 extremities, full and equal strength  SKIN: No rashes or lesions VITALS:   T(C): 35.7 (06-03-25 @ 23:15), Max: 35.7 (06-03-25 @ 23:15)  HR: 76 (06-03-25 @ 23:15) (58 - 87)  BP: 129/87 (06-03-25 @ 23:15) (123/65 - 146/87)  RR: 20 (06-03-25 @ 23:15) (20 - 26)  SpO2: 95% (06-03-25 @ 23:15) (84% - 96%)    GENERAL: NAD, lying in bed comfortably and pleasant, verbal but difficult to understand   HEAD:  Atraumatic, Normocephalic  EYES: EOMI, conjunctiva and sclera clear  ENT: Moist mucous membranes  NECK: Supple, No JVD  CHEST/LUNG: CTA b/l;  Unlabored respirations  HEART: Regular rate and rhythm; No murmurs  ABDOMEN: BS present; Soft, Nontender, Nondistended  EXTREMITIES:  3+ pitting edema all the way up to upper thighs   NERVOUS SYSTEM:  Alert & Oriented X2, speech not clear   MSK: FROM all 4 extremities, full and equal strength  SKIN: No rashes or lesions

## 2025-06-03 NOTE — ED PROVIDER NOTE - CLINICAL SUMMARY MEDICAL DECISION MAKING FREE TEXT BOX
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  Wife  having difficulty  taking  care of him at home.  Dw Dr hansen  admitted  to medicine

## 2025-06-03 NOTE — H&P ADULT - HISTORY OF PRESENT ILLNESS
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  Wife  having difficulty  taking  care of him at home.  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   Wife  having difficulty  taking  care of him at home.      85 yo M with a pmhx of TBI, speech impairment, ex-smoker quit 50 years ago, HTN, HLD, GERD, anemia, splenectomy, skin CA s/p excision and radiation, Hep C (treated), HFrEF, MR/TR/AS, pulmonary htn, orthostatic hypotension, thoracic and lumbar  compression fracture, and CVA  presented to the ED complaining of 3 weeks of leg swelling, Denies trauma to legs. States he has been having difficulty walking, admits to some cough, denies bleeding, CP, SOB at rest, abd pain, n/v, HA.   Wife  having difficulty  taking  care of him at home.

## 2025-06-03 NOTE — ED PROVIDER NOTE - CADM POA URETHRAL CATHETER
Left Nephrostomy noted to have bloody fluid in line. Flushed line with 5cc of sterile 0.9NS per orders. Line flushed easily, but saline came out of the incision site, not the tube. Ramos Suh RN at bedside to assist. Patient tolerated well and denies any pain at this time. Ishmael ZAMUDIO notified of drainage out of incision site. No

## 2025-06-03 NOTE — PATIENT PROFILE ADULT - VISION (WITH CORRECTIVE LENSES IF THE PATIENT USUALLY WEARS THEM):
MOLECULAR PCR Normal vision: sees adequately in most situations; can see medication labels, newsprint

## 2025-06-03 NOTE — PATIENT PROFILE ADULT - FALL HARM RISK - HARM RISK INTERVENTIONS

## 2025-06-03 NOTE — ED PROVIDER NOTE - PHYSICAL EXAMINATION
CONSTITUTIONAL: Chronically ill elderly male; in no apparent distress.   EYES: PERRL; EOM intact.   NECK: + JVD  CARDIOVASCULAR: Normal S1, S2; no murmurs, rubs, or gallops.   RESPIRATORY: Respiratory rate mid 20s.  Bilateral basilar rales.  GI/: Normal bowel sounds; non-distended; non-tender; no palpable organomegaly.  Pitting edema to the genital. rectal: brown stool  MS: 3+ pitting edema to bilateral extremities up to bilateral thigh.  SKIN: N no acute rash. healing ecchymosis to left buttock from prior hip surgery   NEURO/PSYCH: A & O x 3; grossly unremarkable.

## 2025-06-03 NOTE — PATIENT PROFILE ADULT - NSFALLSECTIONLABEL_GEN_A_CORE
CC:  Tesha Cobb       is here today for annual exam.    Medications: medications verified, no change  Refills ordered today?  NO  Tobacco history: verified  Last pap: 2019 neg/neg    Tesha is a 59 year old   who is here today for an annual well-woman exam.  She denies problems or concerns today. 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  She is still very happy with that change.  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  She is seeing Dr. Marrufo next week.  She had her colonoscopy 2020 with one polyp and was told to return in 5 years.    She has history of prolapse and incontinence surgery done over 5 years ago.  She is doing well without concerns.    Her daughter is pregnant and is due with her first child in early October.  This will be her 4th grandchild.    OB History:   with 3 term vaginal deliveries    Gyn History:  Postmenopausal  Contraception: Vasectomy, menopause  She has no history of abnormal pap smears.  She denies dyspareunia.  She denies incontinence.  Prolapse surgery 17 with anterior repair with mesh, sacrospinous vault suspension, and TVT by Dr. Do.       I have reviewed the patient's vital signs, medications and allergies, past medical, surgical, social and family history, updating these as appropriate.  See Histories section of the EMR for a display of this information.    ROS:    CONSTITUTIONAL: Negative for fever and unexplained weight loss.  SKIN: Negative for rash or other skin problems.  HEENT: Negative for headaches.  BREAST: Negative for breast biopsy.  RESPIRATORY: Negative for breathing problems.  CARDIOVASCULAR: Positive for open heart surgery and spontaneous coronary artery dissection.  GASTROINTESTINAL: Negative for gastrointestinal problems.  GENITOURINARY: Negative for history of endometriosis, fibroids, STDs.  PSYCH: Positive for anxiety  and negative for depression.  ENDOCRINE: Negative for thyroid problems and diabetes.  HEME/ONC: Negative for cancer, DVT, PE.  All other review of systems negative or described above.    Physical examination:    Blood pressure 110/78, height 5' 7\" (1.702 m), weight 87.4 kg (192 lb 10.9 oz), last menstrual period 2015. Body mass index is 30.18 kg/m².    General:  This is a pleasant female who is in no acute distress and alert and oriented x3 with appropriate mood and affect.  Head: Normocephalic and atraumatic.  Neck:  Trachea midline, thyroid without tenderness, enlargement, or masses.  No cervical lymphadenopathy.  Respiratory:  Normal respiratory effort with lungs clear to auscultation bilaterally without wheezes, rales or rhonchi.  Cardiovascular:  Heart has a regular rate and rhythm without murmurs, rubs,or gallops.  Lower extremities nontender with no pretibial edema.  Breasts: Normal symmetry and contour without any masses, tenderness, nipple discharge, or axillary lymphadenopathy palpated bilaterally.  Abdomen: Soft, nontender, nondistended, without rebound, rigidity, guarding, or masses. No hepatosplenomegaly.    Genitourinary:  Normal external female genitalia. Normal urethral meatus. On speculum examination normal vaginal mucosa. Normal-appearing cervix without any lesions. No abnormal discharge was in the vagina. On bimanual examination: No urethral tenderness or masses. No bladder tenderness or masses. No cervical motion tenderness. Uterus is mobile, nontender, and normal size, shape and contour. No adnexal tenderness or masses palpated bilaterally.  Her mesh is palpable with no evidence of any erosion.    Assessment and plan: This is a 59 year old   here for an annual well woman exam who is doing well.  Pap and HPV cotesting were negative 2022  She is up to date on health maintenance.  Mammogram will be done today.  Td up-to-date. Colonoscopy is up-to-date. Cholesterol and diabetes  screening are being followed by her PCP.  Her hepatitis C screening was negative.  She has completed the shingles vaccinations.  Family planning is vasectomy  Postmenopausal-doing well with no hormone replacement therapy.  Prolapse-stable after anterior repair with mesh, TVT, sacrospinous vault suspension.  She was instructed to follow-up in one year unless she has any questions or concerns prior to that time.         .

## 2025-06-03 NOTE — ED PROVIDER NOTE - WHICH SHOWED
I  Dr Amos performed independent interpretation of EKG  -  nsr no stemi no acute ischemia  independent interpretation,   by myself Dr Amos, of CXR -  no ptx no opacity not wide cardiomegaly

## 2025-06-03 NOTE — H&P ADULT - NSHPLABSRESULTS_GEN_ALL_CORE
LABS:  cret                        8.6    7.03  )-----------( 208      ( 03 Jun 2025 19:27 )             26.9     06-03    134[L]  |  97[L]  |  62[HH]  ----------------------------<  116[H]  4.5   |  21  |  3.2[H]    Ca    8.3[L]      03 Jun 2025 19:27    TPro  7.2  /  Alb  3.3[L]  /  TBili  2.8[H]  /  DBili  x   /  AST  77[H]  /  ALT  90[H]  /  AlkPhos  117[H]  06-03        RADIOLOGY: LABS:  cret                        8.6    7.03  )-----------( 208      ( 03 Jun 2025 19:27 )             26.9     06-03    134[L]  |  97[L]  |  62[HH]  ----------------------------<  116[H]  4.5   |  21  |  3.2[H]    Ca    8.3[L]      03 Jun 2025 19:27    TPro  7.2  /  Alb  3.3[L]  /  TBili  2.8[H]  /  DBili  x   /  AST  77[H]  /  ALT  90[H]  /  AlkPhos  117[H]  06-03

## 2025-06-03 NOTE — H&P ADULT - NSHPPOAPULMEMBOLUS_GEN_A_CORE
Problem: Physical Therapy - Adult  Goal: By Discharge: Performs mobility at highest level of function for planned discharge setting.  See evaluation for individualized goals.  Description: Physical Therapy Goals  Re-evaluated 5/28/2024 and updated below.   Initiated 5/22/2024 and to be accomplished within 7 day(s)  1.  Patient will move from supine to sit and sit to supine  in bed with modified independence.    2.  Patient will transfer from bed to chair and chair to bed with supervision/set-up using the least restrictive device.  3.  Patient will perform sit to stand with supervision/set-up.  4.  Patient will ambulate with minimal assistance/contact guard assist for 25 feet with the least restrictive device.     PLOF: Independent. Lives in independent living apartment, one level, and elevator. Did not use AD for ambulation.   Outcome: Progressing   PHYSICAL THERAPY RE-EVALUATION    Patient: Juanpablo Jackson (82 y.o. 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 TTWB RLE. Able to perform straight leg raise on RLE. Supervision for supine to sit. Min A for sit to stand. Mod A for transfer to chair with ww. Good compliance with TTWB RLE. Seated in recliner. Performed seated BLE AROM as noted below. BLE elevated at end of session. Educated on need for RN assistance with mobility; verbalized understanding. Call bell in reach.     DEFICITS/IMPAIRMENTS:   Body Structures, Functions, Activity Limitations Requiring Skilled  no

## 2025-06-03 NOTE — ED PROVIDER NOTE - OBJECTIVE STATEMENT
84 years old male history of hypertension, high cholesterol, pulmonary hypertension, CVA, aortic stenosis, heart failure, GERD, anemia presenting complaint increasing bilateral leg swelling for 3 weeks with shortness of breath.  Patient found to be hypoxic on ED arrival.  Otherwise no fever, chills, chest pain, abdominal pain, black and bloody stool and urinary symptoms.

## 2025-06-03 NOTE — H&P ADULT - ASSESSMENT
# Pulmonary edema  Secondary Diagnosis:	Acute on chronic renal failure. 83 yo M with a pmhx of TBI, speech impairment, ex-smoker quit 50 years ago, HTN, HLD, GERD, anemia, splenectomy, skin CA s/p excision and radiation, Hep C (treated), HFrEF, MR/TR/AS, pulmonary htn, orthostatic hypotension, thoracic and lumbar  compression fracture, and CVA  presented to the ED complaining of 3 weeks of leg swelling,    # Pulmonary edema  #Acute on chronic renal failure  # HFrEF  - troponin 1st draw 99, repeat pending  - BNP 33733, will trend  - Hold torsemide   - IV lasix 40mg daily  - !L fluid restriction  - I&O  - Daily weight  - consider TTE  - Cr 3.2, baseline around 2.1, will trend  - consider nephrology consult  - O2  - duoneb  - LA 3.8, will trend  -  am labs    # Anemia  - Hg 8.6, baseline around 10.5  - T&S    # HTN  # HLD  - c/w home meds  - DASH diet  -  monitor BP    # CVA  - c/w plavix and ASA    # GERD  - c/w protonix    # BPH  - c/w flomax    As per ED, Dr Enriquez will talk to pts wife and discuss hospice. Second troponin was not drawn and pt admitted to medical floor

## 2025-06-03 NOTE — PATIENT PROFILE ADULT - OVER THE PAST TWO WEEKS, HAVE YOU FELT LITTLE INTEREST OR PLEASURE IN DOING THINGS?
----- Message from AR Jesus AR Geronimo     no

## 2025-06-04 DIAGNOSIS — I50.20 UNSPECIFIED SYSTOLIC (CONGESTIVE) HEART FAILURE: ICD-10-CM

## 2025-06-04 DIAGNOSIS — J81.1 CHRONIC PULMONARY EDEMA: ICD-10-CM

## 2025-06-04 DIAGNOSIS — N17.9 ACUTE KIDNEY FAILURE, UNSPECIFIED: ICD-10-CM

## 2025-06-04 DIAGNOSIS — Z51.5 ENCOUNTER FOR PALLIATIVE CARE: ICD-10-CM

## 2025-06-04 DIAGNOSIS — I27.20 PULMONARY HYPERTENSION, UNSPECIFIED: ICD-10-CM

## 2025-06-04 DIAGNOSIS — Z71.89 OTHER SPECIFIED COUNSELING: ICD-10-CM

## 2025-06-04 LAB
ALBUMIN SERPL ELPH-MCNC: 3.2 G/DL — LOW (ref 3.5–5.2)
ALP SERPL-CCNC: 109 U/L — SIGNIFICANT CHANGE UP (ref 30–115)
ALT FLD-CCNC: 167 U/L — HIGH (ref 0–41)
ANION GAP SERPL CALC-SCNC: 18 MMOL/L — HIGH (ref 7–14)
ANION GAP SERPL CALC-SCNC: 19 MMOL/L — HIGH (ref 7–14)
AST SERPL-CCNC: 192 U/L — HIGH (ref 0–41)
BILIRUB SERPL-MCNC: 3.4 MG/DL — HIGH (ref 0.2–1.2)
BUN SERPL-MCNC: 64 MG/DL — CRITICAL HIGH (ref 10–20)
BUN SERPL-MCNC: 68 MG/DL — CRITICAL HIGH (ref 10–20)
CALCIUM SERPL-MCNC: 8.2 MG/DL — LOW (ref 8.4–10.5)
CALCIUM SERPL-MCNC: 8.4 MG/DL — SIGNIFICANT CHANGE UP (ref 8.4–10.5)
CHLORIDE SERPL-SCNC: 96 MMOL/L — LOW (ref 98–110)
CHLORIDE SERPL-SCNC: 99 MMOL/L — SIGNIFICANT CHANGE UP (ref 98–110)
CO2 SERPL-SCNC: 20 MMOL/L — SIGNIFICANT CHANGE UP (ref 17–32)
CO2 SERPL-SCNC: 21 MMOL/L — SIGNIFICANT CHANGE UP (ref 17–32)
CREAT SERPL-MCNC: 3.6 MG/DL — HIGH (ref 0.7–1.5)
CREAT SERPL-MCNC: 3.8 MG/DL — HIGH (ref 0.7–1.5)
EGFR: 15 ML/MIN/1.73M2 — LOW
EGFR: 15 ML/MIN/1.73M2 — LOW
EGFR: 16 ML/MIN/1.73M2 — LOW
EGFR: 16 ML/MIN/1.73M2 — LOW
GLUCOSE SERPL-MCNC: 66 MG/DL — LOW (ref 70–99)
GLUCOSE SERPL-MCNC: 77 MG/DL — SIGNIFICANT CHANGE UP (ref 70–99)
HCT VFR BLD CALC: 25.3 % — LOW (ref 42–52)
HGB BLD-MCNC: 7.9 G/DL — LOW (ref 14–18)
LACTATE SERPL-SCNC: 2.9 MMOL/L — HIGH (ref 0.7–2)
LACTATE SERPL-SCNC: 3.9 MMOL/L — HIGH (ref 0.7–2)
MCHC RBC-ENTMCNC: 31.2 G/DL — LOW (ref 32–37)
MCHC RBC-ENTMCNC: 33.5 PG — HIGH (ref 27–31)
MCV RBC AUTO: 107.2 FL — HIGH (ref 80–94)
NRBC BLD AUTO-RTO: 1 /100 WBCS — HIGH (ref 0–0)
NT-PROBNP SERPL-SCNC: HIGH PG/ML (ref 0–300)
PLATELET # BLD AUTO: 196 K/UL — SIGNIFICANT CHANGE UP (ref 130–400)
PMV BLD: 13.2 FL — HIGH (ref 7.4–10.4)
POTASSIUM SERPL-MCNC: 4.5 MMOL/L — SIGNIFICANT CHANGE UP (ref 3.5–5)
POTASSIUM SERPL-MCNC: 4.7 MMOL/L — SIGNIFICANT CHANGE UP (ref 3.5–5)
POTASSIUM SERPL-SCNC: 4.5 MMOL/L — SIGNIFICANT CHANGE UP (ref 3.5–5)
POTASSIUM SERPL-SCNC: 4.7 MMOL/L — SIGNIFICANT CHANGE UP (ref 3.5–5)
PROT SERPL-MCNC: 7 G/DL — SIGNIFICANT CHANGE UP (ref 6–8)
RBC # BLD: 2.36 M/UL — LOW (ref 4.7–6.1)
RBC # FLD: 23.3 % — HIGH (ref 11.5–14.5)
SODIUM SERPL-SCNC: 135 MMOL/L — SIGNIFICANT CHANGE UP (ref 135–146)
SODIUM SERPL-SCNC: 138 MMOL/L — SIGNIFICANT CHANGE UP (ref 135–146)
WBC # BLD: 7.49 K/UL — SIGNIFICANT CHANGE UP (ref 4.8–10.8)
WBC # FLD AUTO: 7.49 K/UL — SIGNIFICANT CHANGE UP (ref 4.8–10.8)

## 2025-06-04 PROCEDURE — 99497 ADVNCD CARE PLAN 30 MIN: CPT | Mod: 25

## 2025-06-04 PROCEDURE — 99223 1ST HOSP IP/OBS HIGH 75: CPT

## 2025-06-04 RX ORDER — HEPARIN SODIUM 1000 [USP'U]/ML
5000 INJECTION INTRAVENOUS; SUBCUTANEOUS EVERY 8 HOURS
Refills: 0 | Status: DISCONTINUED | OUTPATIENT
Start: 2025-06-04 | End: 2025-06-09

## 2025-06-04 RX ORDER — ESCITALOPRAM OXALATE 20 MG/1
20 TABLET ORAL DAILY
Refills: 0 | Status: DISCONTINUED | OUTPATIENT
Start: 2025-06-04 | End: 2025-06-09

## 2025-06-04 RX ORDER — MAGNESIUM, ALUMINUM HYDROXIDE 200-200 MG
30 TABLET,CHEWABLE ORAL EVERY 4 HOURS
Refills: 0 | Status: DISCONTINUED | OUTPATIENT
Start: 2025-06-04 | End: 2025-06-09

## 2025-06-04 RX ORDER — FUROSEMIDE 10 MG/ML
60 INJECTION INTRAMUSCULAR; INTRAVENOUS ONCE
Refills: 0 | Status: COMPLETED | OUTPATIENT
Start: 2025-06-04 | End: 2025-06-04

## 2025-06-04 RX ORDER — TAMSULOSIN HYDROCHLORIDE 0.4 MG/1
0.4 CAPSULE ORAL AT BEDTIME
Refills: 0 | Status: DISCONTINUED | OUTPATIENT
Start: 2025-06-04 | End: 2025-06-09

## 2025-06-04 RX ORDER — ASPIRIN 325 MG
81 TABLET ORAL DAILY
Refills: 0 | Status: DISCONTINUED | OUTPATIENT
Start: 2025-06-04 | End: 2025-06-09

## 2025-06-04 RX ORDER — ATORVASTATIN CALCIUM 80 MG/1
80 TABLET, FILM COATED ORAL AT BEDTIME
Refills: 0 | Status: DISCONTINUED | OUTPATIENT
Start: 2025-06-04 | End: 2025-06-09

## 2025-06-04 RX ORDER — FLUDROCORTISONE ACETATE 0.1 MG
0.1 TABLET ORAL DAILY
Refills: 0 | Status: DISCONTINUED | OUTPATIENT
Start: 2025-06-04 | End: 2025-06-09

## 2025-06-04 RX ORDER — ONDANSETRON HCL/PF 4 MG/2 ML
4 VIAL (ML) INJECTION EVERY 8 HOURS
Refills: 0 | Status: DISCONTINUED | OUTPATIENT
Start: 2025-06-04 | End: 2025-06-09

## 2025-06-04 RX ORDER — FUROSEMIDE 10 MG/ML
40 INJECTION INTRAMUSCULAR; INTRAVENOUS DAILY
Refills: 0 | Status: DISCONTINUED | OUTPATIENT
Start: 2025-06-04 | End: 2025-06-09

## 2025-06-04 RX ORDER — ALBUTEROL SULFATE 2.5 MG/3ML
2 VIAL, NEBULIZER (ML) INHALATION EVERY 4 HOURS
Refills: 0 | Status: DISCONTINUED | OUTPATIENT
Start: 2025-06-04 | End: 2025-06-09

## 2025-06-04 RX ORDER — ACETAMINOPHEN 500 MG/5ML
650 LIQUID (ML) ORAL EVERY 6 HOURS
Refills: 0 | Status: DISCONTINUED | OUTPATIENT
Start: 2025-06-04 | End: 2025-06-09

## 2025-06-04 RX ORDER — MIRTAZAPINE 30 MG/1
15 TABLET, FILM COATED ORAL AT BEDTIME
Refills: 0 | Status: DISCONTINUED | OUTPATIENT
Start: 2025-06-04 | End: 2025-06-09

## 2025-06-04 RX ORDER — SPIRONOLACTONE 25 MG
25 TABLET ORAL DAILY
Refills: 0 | Status: DISCONTINUED | OUTPATIENT
Start: 2025-06-04 | End: 2025-06-09

## 2025-06-04 RX ORDER — CLOPIDOGREL BISULFATE 75 MG/1
75 TABLET, FILM COATED ORAL DAILY
Refills: 0 | Status: DISCONTINUED | OUTPATIENT
Start: 2025-06-04 | End: 2025-06-09

## 2025-06-04 RX ORDER — IPRATROPIUM BROMIDE AND ALBUTEROL SULFATE .5; 2.5 MG/3ML; MG/3ML
3 SOLUTION RESPIRATORY (INHALATION) EVERY 6 HOURS
Refills: 0 | Status: DISCONTINUED | OUTPATIENT
Start: 2025-06-04 | End: 2025-06-09

## 2025-06-04 RX ADMIN — HEPARIN SODIUM 5000 UNIT(S): 1000 INJECTION INTRAVENOUS; SUBCUTANEOUS at 05:29

## 2025-06-04 RX ADMIN — ESCITALOPRAM OXALATE 20 MILLIGRAM(S): 20 TABLET ORAL at 11:11

## 2025-06-04 RX ADMIN — Medication 25 MILLIGRAM(S): at 05:29

## 2025-06-04 RX ADMIN — Medication 0.1 MILLIGRAM(S): at 05:29

## 2025-06-04 RX ADMIN — CLOPIDOGREL BISULFATE 75 MILLIGRAM(S): 75 TABLET, FILM COATED ORAL at 11:11

## 2025-06-04 RX ADMIN — FUROSEMIDE 40 MILLIGRAM(S): 10 INJECTION INTRAMUSCULAR; INTRAVENOUS at 05:29

## 2025-06-04 RX ADMIN — IPRATROPIUM BROMIDE AND ALBUTEROL SULFATE 3 MILLILITER(S): .5; 2.5 SOLUTION RESPIRATORY (INHALATION) at 20:08

## 2025-06-04 RX ADMIN — TAMSULOSIN HYDROCHLORIDE 0.4 MILLIGRAM(S): 0.4 CAPSULE ORAL at 23:02

## 2025-06-04 RX ADMIN — MIRTAZAPINE 15 MILLIGRAM(S): 30 TABLET, FILM COATED ORAL at 23:02

## 2025-06-04 RX ADMIN — Medication 1 APPLICATION(S): at 05:29

## 2025-06-04 RX ADMIN — FUROSEMIDE 60 MILLIGRAM(S): 10 INJECTION INTRAMUSCULAR; INTRAVENOUS at 17:01

## 2025-06-04 RX ADMIN — HEPARIN SODIUM 5000 UNIT(S): 1000 INJECTION INTRAVENOUS; SUBCUTANEOUS at 13:12

## 2025-06-04 RX ADMIN — Medication 81 MILLIGRAM(S): at 11:10

## 2025-06-04 RX ADMIN — HEPARIN SODIUM 5000 UNIT(S): 1000 INJECTION INTRAVENOUS; SUBCUTANEOUS at 23:02

## 2025-06-04 RX ADMIN — IPRATROPIUM BROMIDE AND ALBUTEROL SULFATE 3 MILLILITER(S): .5; 2.5 SOLUTION RESPIRATORY (INHALATION) at 14:31

## 2025-06-04 RX ADMIN — Medication 40 MILLIGRAM(S): at 05:29

## 2025-06-04 RX ADMIN — IPRATROPIUM BROMIDE AND ALBUTEROL SULFATE 3 MILLILITER(S): .5; 2.5 SOLUTION RESPIRATORY (INHALATION) at 07:38

## 2025-06-04 RX ADMIN — ATORVASTATIN CALCIUM 80 MILLIGRAM(S): 80 TABLET, FILM COATED ORAL at 23:02

## 2025-06-04 NOTE — CONSULT NOTE ADULT - ASSESSMENT
83 yo M with a pmhx of TBI, speech impairment, ex-smoker quit 50 years ago, HTN, HLD, GERD, anemia, splenectomy, skin CA s/p excision and radiation, Hep C (treated), HFrEF, MR/TR/AS, pulmonary htn, orthostatic hypotension, thoracic and lumbar  compression fracture, and CVA  presented to the ED complaining of 3 weeks of leg swelling.  Patient with pulmonary edema and acute on chronic renal failure on arrival.  Palliative care consulted for GOC.    Spoke with patient's wife, Maricruz, outside the patient's room, as the patient was lethargic and unable to participate in exam.  Palliative care introduced. He        MEDD (morphine equivalent daily dose):    Education about palliative care provided to patient/family.      Please call [phone] with questions or concerns 24/7.    85 yo M with a pmhx of TBI, speech impairment, ex-smoker quit 50 years ago, HTN, HLD, GERD, anemia, splenectomy, skin CA s/p excision and radiation, Hep C (treated), HFrEF, MR/TR/AS, pulmonary htn, orthostatic hypotension, thoracic and lumbar  compression fracture, and CVA  presented to the ED complaining of 3 weeks of leg swelling.  Patient with pulmonary edema and acute on chronic renal failure on arrival.  Palliative care consulted for GOC.    Spoke with patient's wife, Maricruz, outside the patient's room, as the patient was lethargic and unable to participate in exam.  Palliative care introduced. She was able to provide a medical history and hospital course, and noted the patient has been declining. We discussed GOC an code status. She confirmed the patient would not want CPR or intubation and wishes for DNR/DNI.  We discussed GOC moving forward, and she noted the patient doesn't want aggressive care moving forward and would rather come home and be comfortable. We discussed hospice at length. She wishes to talk to hospice with her daughter, but wishes for hospice on disharge. Will place hospice referral.     Plan  -DNR/DNI  -MOLST filled out and placed in chart  -ongoing medical management  -continue IV diuresis and intensive monitoring of renal function given ASHLEY  -hospice referral  -rest of care per primary team  -will follow for GOC    MEDD (morphine equivalent daily dose):    Education about palliative care provided to patient/family.  Discussed with KANDY Avila    Please call [phone] with questions or concerns 24/7.

## 2025-06-04 NOTE — CHART NOTE - NSCHARTNOTEFT_GEN_A_CORE
I spoke to ER doctor about treatment plan discussed with PMD. In fact I was told that no interventions are anticipated and that placement in hospice was planned I spoke to ER doctor about treatment plan discussed with PMD. In fact I was told that no interventions are anticipated and that placement in hospice was planned. Therefore repeat troponin not ordered I spoke to ER doctor about treatment plan discussed with PMD. In fact I was told that no interventions are anticipated and that placement in hospice was planned (patient had signed DNR and DNI forms on Aug 18, 2017, copy is in chart). Therefore repeat troponin not ordered

## 2025-06-04 NOTE — CONSULT NOTE ADULT - CONVERSATION DETAILS
Spoke with patient's wife, Maricruz, outside the patient's room, as the patient was lethargic and unable to participate in exam.  Palliative care introduced. She was able to provide a medical history and hospital course, and noted the patient has been declining. We discussed GOC an code status. She confirmed the patient would not want CPR or intubation and wishes for DNR/DNI.  We discussed GOC moving forward, and she noted the patient doesn't want aggressive care moving forward and would rather come home and be comfortable. We discussed hospice at length. She wishes to talk to hospice with her daughter, but wishes for hospice on disharge. Will place hospice referral.

## 2025-06-04 NOTE — CHART NOTE - NSCHARTNOTEFT_GEN_A_CORE
Patient seen and assessed at bedside. Lethargic appearing, though able to respond to simple questions. Denies any pain or SOB.     On exam, with severe 4+ bilateral LE pitting edema, crackles at bases. Abdomen nontender, nondistended. Sauer in place, draining red urine. Breathing comfortably on 5L NC    Labs reviewed. BNP markedly elevated at 46k. Cr elevated at 3.2. Noted transaminitis, suspect congestion from fluid overload.    Plan  - Cont IV lasix, will give additional 40mg this afternoon  - Monitor electrolytes while diuresing   - Repeat BMP and lactate this afternoon   - Follow up labs in AM for trend  - Palliative care consulted

## 2025-06-04 NOTE — HOSPICE CARE NOTE - CONVESATION DETAILS
Attempted to call patient's spouse Maricruz [phone], left message on voicemail to call back hospice office with hospice contact number.

## 2025-06-04 NOTE — CHART NOTE - NSCHARTNOTEFT_GEN_A_CORE
Bladder scan: >900cc..   14Fr Coude Sauer with balloon placed at bedside with some resistance and minor bleeding.  Successful insertion of catheter with active urine output.

## 2025-06-04 NOTE — HOSPICE CARE NOTE - CONVESATION DETAILS
Hospice referral completed. Phone call to patient's daughter Iveth [phone], reviewed hospice services. Family is receptive to hospice at home and will need DME. Hospice to arrange admission for early next week. Patient is still being medically managed.

## 2025-06-04 NOTE — CONSULT NOTE ADULT - SUBJECTIVE AND OBJECTIVE BOX
CC: 3 weeks leg swelling    HPI:  85 yo M with a pmhx of TBI, speech impairment, ex-smoker quit 50 years ago, HTN, HLD, GERD, anemia, splenectomy, skin CA s/p excision and radiation, Hep C (treated), HFrEF, MR/TR/AS, pulmonary htn, orthostatic hypotension, thoracic and lumbar  compression fracture, and CVA  presented to the ED complaining of 3 weeks of leg swelling, Denies trauma to legs. States he has been having difficulty walking, admits to some cough, denies bleeding, CP, SOB at rest, abd pain, n/v, HA.   Wife  having difficulty  taking  care of him at home.      (03 Jun 2025 23:07)    PERTINENT PM/SXH:   Hypercholesteremia    Gastroesophageal reflux disease, esophagitis presence not specified    Other depression    Hepatitis C virus infection cured after antiviral drug therapy    Skin cancer    Benign prostate hyperplasia    Spleen injury    Depression    CVA (cerebral vascular accident)    Anemia    HTN (hypertension)    Acute systolic congestive heart failure    H/O orthostatic hypotension    Tricuspid regurgitation    H/O pulmonary hypertension    Mitral regurgitation    Aortic stenosis    Compression fracture of vertebra      H/O spleen injury    History of skin cancer      FAMILY HISTORY:  Family history of breast cancer in female (Mother)    Family history of throat cancer (Father)    ITEMS NOT CHECKED ARE NOT PRESENT    SOCIAL HISTORY:   Significant other/partner[ ]  Children[ ]  Yazdanism/Spirituality:  Substance hx:  [ ]   Tobacco hx:  [ ]   Alcohol hx: [ ]   Living Situation: [x ]Home  [ ]Long term care  [ ]Rehab [ ]Other  Home Services: [ ] HHA [ ] Visting RN [ ] Hospice  Occupation:  Home Opioid hx:  [ ] Y [ ] N [ x] I-Stop Reference No:    Reference #: [id number] - no meds listed     ADVANCE DIRECTIVES:     [ ] Full Code [ x] DNR  MOLST  [ ]  Living Will  [ ]   DECISION MAKER(s):  [ ] Health Care Proxy(s)  [ x] Surrogate(s)  [ ] Guardian           Name(s): Phone Number(s):  [place] -  spouse      BASELINE (I)ADL(s) (prior to admission):    [place]: [ ]Total  [ ] Moderate [ ]Dependent  Palliative Performance Status Version 2:         %    http://npcrc.org/files/news/palliative_performance_scale_ppsv2.pdf    Allergies    No Known Allergies    Intolerances    MEDICATIONS  (STANDING):  albuterol/ipratropium for Nebulization 3 milliLiter(s) Nebulizer every 6 hours  aspirin enteric coated 81 milliGRAM(s) Oral daily  atorvastatin 80 milliGRAM(s) Oral at bedtime  chlorhexidine 2% Cloths 1 Application(s) Topical <User Schedule>  clopidogrel Tablet 75 milliGRAM(s) Oral daily  escitalopram 20 milliGRAM(s) Oral daily  fludroCORTISONE 0.1 milliGRAM(s) Oral daily  furosemide   Injectable 40 milliGRAM(s) IV Push daily  heparin   Injectable 5000 Unit(s) SubCutaneous every 8 hours  mirtazapine 15 milliGRAM(s) Oral at bedtime  pantoprazole    Tablet 40 milliGRAM(s) Oral before breakfast  spironolactone 25 milliGRAM(s) Oral daily  tamsulosin 0.4 milliGRAM(s) Oral at bedtime    MEDICATIONS  (PRN):  acetaminophen     Tablet .. 650 milliGRAM(s) Oral every 6 hours PRN Temp greater or equal to 38C (100.4F), Mild Pain (1 - 3)  albuterol    90 MICROgram(s) HFA Inhaler 2 Puff(s) Inhalation every 4 hours PRN Shortness of Breath and/or Wheezing  aluminum hydroxide/magnesium hydroxide/simethicone Suspension 30 milliLiter(s) Oral every 4 hours PRN Dyspepsia  ondansetron Injectable 4 milliGRAM(s) IV Push every 8 hours PRN Nausea and/or Vomiting    PRESENT SYMPTOMS: [ ]Unable to obtain due to poor mentation   Source if other than patient:  [ ]Family   [ ]Team     Pain: [ ]yes [x ]no  ALL PAIN ASSESSMENT COMPONENTS WERE ASKED ABOUT UNLESS OTHERWISE NOTED  QOL impact -   Location -                    Aggravating factors -  Quality -  Radiation -  Timing-  Severity (0-10 scale):  Minimal acceptable level (0-10 scale):     CPOT:    https://www.sccm.org/getattachment/jqx15w38-9w7t-8c9g-2c3i-5814z9245q7k/Critical-Care-Pain-Observation-Tool-(CPOT)    PAIN AD Score:   http://geriatrictoolkit.[place].[facility]/cog/painad.pdf (press ctrl +  left click to view)    Dyspnea:                           [x ]None[ ]Mild [ ]Moderate [ ]Severe     Respiratory Distress Observation Scale (RDOS):   A score of 0 to 2 signifies little or no respiratory distress, 3 signifies mild distress, scores 4 to 6 indicate moderate distress, and scores greater than 7 signify severe distress  https://www.[facility].ca/sites/default/files/PDFS/720431-tbuumytdmwj-jcsvsyeq-vcltlgwyebp-kpqpk.pdf    Anxiety:                             [ ]None[ ]Mild [ ]Moderate [ ]Severe   Fatigue:                             [ ]None[ ]Mild [ ]Moderate [ ]Severe   Nausea:                             [ x]None[ ]Mild [ ]Moderate [ ]Severe   Loss of appetite:              [ ]None[ ]Mild [ ]Moderate [ ]Severe   Constipation:                    [ ]None[ ]Mild [ ]Moderate [ ]Severe    Other Symptoms:  [x ]All other review of systems negative     Palliative Performance Status Version 2:         20%    http://npcrc.org/files/news/palliative_performance_scale_ppsv2.pdf    PHYSICAL EXAM:  Vital Signs Last 24 Hrs  T(C): 35.6 (04 Jun 2025 13:12), Max: 36.3 (04 Jun 2025 04:49)  T(F): 96 (04 Jun 2025 13:12), Max: 97.4 (04 Jun 2025 04:49)  HR: 87 (04 Jun 2025 13:12) (58 - 97)  BP: 112/82 (04 Jun 2025 13:12) (112/82 - 146/87)  BP(mean): --  RR: 18 (04 Jun 2025 13:12) (18 - 26)  SpO2: 100% (04 Jun 2025 13:12) (84% - 100%)    Parameters below as of 04 Jun 2025 13:12  Patient On (Oxygen Delivery Method): nasal cannula  O2 Flow (L/min): 4   I&O's Summary      GENERAL:  [ ] No acute distress [x ]Lethargic  [ ]Unarousable  [ ]Verbal  [ ]Non-Verbal [ ]Cachexia    BEHAVIORAL/PSYCH:  [ ]Alert and Oriented x  [ ] Anxiety [ ] Delirium [ ] Agitation [x ] Calm   EYES: [ ] No scleral icterus [ ] Scleral icterus [ ] Closed  ENMT:  [ ]Dry mouth  [ ]No external oral lesions [ ] No external ear or nose lesions  CARDIOVASCULAR:  [ ]Regular [ ]Irregular [ ]Tachy [ ]Not Tachy  [ ]Sergio [ ] Edema [ ] No edema  PULMONARY:  [x ]Tachypnea  [ ]Audible excessive secretions [ ] No labored breathing [ ] labored breathing  GASTROINTESTINAL: [ ]Soft  [ ]Distended  [ ]Not distended [ ]Non tender [ ]Tender  MUSCULOSKELETAL: [ ]No clubbing [ ] clubbing  [ ] No cyanosis [ ] cyanosis  NEUROLOGIC: [ ]No focal deficits  [x ]Follows commands  [ ]Does not follow commands  [x ]Cognitive impairment  [ ]Dysphagia  [ ]Dysarthria  [ ]Paresis   SKIN: [ ] Jaundiced [ x] Non-jaundiced [ ]Rash [ ]No Rash [ ] Warm [ ] Dry  MISC/LINES: [ ] ET tube [ ] Trach [ ]NGT/OGT [ ]PEG [ ]Sauer    LABS: Interpreted by me - BMP shows elevated creatinine, CBC shows anemia, LFTs shows low albumin and high Tibili                        7.9    7.49  )-----------( 196      ( 04 Jun 2025 07:16 )             25.3   06-04    135  |  96[L]  |  64[HH]  ----------------------------<  66[L]  4.7   |  20  |  3.6[H]    Ca    8.4      04 Jun 2025 07:16    TPro  7.0  /  Alb  3.2[L]  /  TBili  3.4[H]  /  DBili  x   /  AST  192[H]  /  ALT  167[H]  /  AlkPhos  109  06-04      Urinalysis Basic - ( 04 Jun 2025 07:16 )    Color: x / Appearance: x / SG: x / pH: x  Gluc: 66 mg/dL / Ketone: x  / Bili: x / Urobili: x   Blood: x / Protein: x / Nitrite: x   Leuk Esterase: x / RBC: x / WBC x   Sq Epi: x / Non Sq Epi: x / Bacteria: x      RADIOLOGY & ADDITIONAL STUDIES: Interpreted by me    < from: Xray Chest 1 View- PORTABLE-Urgent (06.03.25 @ 19:56) >  IMPRESSION:    Stable left base opacity    < end of copied text >      EKG: Interpreted by me    < from: 12 Lead ECG (06.03.25 @ 19:39) >    Ventricular Rate 90 BPM    Atrial Rate 288 BPM    QRS Duration 130 ms    Q-T Interval 348 ms    QTC Calculation(Bazett) 425 ms    R Axis -42 degrees    T Axis 32 degrees    Diagnosis Line    Undetermined rhythm  *** Poor data quality, interpretation may be adversely affected  Left axis deviation  Right bundle branch block  Abnormal ECG    < end of copied text >      PROTEIN CALORIE MALNUTRITION PRESENT: [ ]mild [ ]moderate [ ]severe [ ]underweight [ ]morbid obesity  https://www.andeal.org/vault/2440/web/files/ONC/Table_Clinical%20Characteristics%20to%20Document%20Malnutrition-White%20JV%20et%20al%202012.pdf    Height (cm): 182.9 (06-03-25 @ 23:15), 167.6 (04-25-25 @ 11:00)  Weight (kg): 96.9 (06-03-25 @ 23:15), 68 (04-25-25 @ 11:00), 80 (12-27-24 @ 04:45)  BMI (kg/m2): 29 (06-03-25 @ 23:15), 24.2 (04-25-25 @ 11:00)  [ ]PPSV2 < or = to 30% [ ]significant weight loss  [ ]poor nutritional intake  [ ]anasarca      [ ]Artificial Nutrition      Palliative Care Spiritual/Emotional Screening Tool Question  Severity (0-4):                    OR                    [ x] Unable to determine. Will assess at later time if appropriate.  Score of 2 or greater indicates recommendation of Chaplaincy and/or SW referral  Chaplaincy Referral: [ ] Yes [ ] Refused [ ] Following     Caregiver [place]:  [ ] Yes [ ] No    OR    [x ] Unable to determine. Will assess at later time if appropriate.  Social Work Referral [ ]  Patient and Family Centered Care Referral [ ]    Anticipatory Grief Present: [ ] Yes [ ] No    OR     [ x] Unable to determine. Will assess at later time if appropriate.  Social Work Referral [ ]  Patient and Family Centered Care Referral [ ]    Patient discussed with primary medical team MD  Palliative care education provided to patient and/or family

## 2025-06-05 PROCEDURE — 99232 SBSQ HOSP IP/OBS MODERATE 35: CPT

## 2025-06-05 RX ADMIN — TAMSULOSIN HYDROCHLORIDE 0.4 MILLIGRAM(S): 0.4 CAPSULE ORAL at 21:51

## 2025-06-05 RX ADMIN — IPRATROPIUM BROMIDE AND ALBUTEROL SULFATE 3 MILLILITER(S): .5; 2.5 SOLUTION RESPIRATORY (INHALATION) at 19:52

## 2025-06-05 RX ADMIN — HEPARIN SODIUM 5000 UNIT(S): 1000 INJECTION INTRAVENOUS; SUBCUTANEOUS at 06:45

## 2025-06-05 RX ADMIN — ATORVASTATIN CALCIUM 80 MILLIGRAM(S): 80 TABLET, FILM COATED ORAL at 21:51

## 2025-06-05 RX ADMIN — Medication 1 APPLICATION(S): at 06:45

## 2025-06-05 RX ADMIN — HEPARIN SODIUM 5000 UNIT(S): 1000 INJECTION INTRAVENOUS; SUBCUTANEOUS at 14:17

## 2025-06-05 RX ADMIN — MIRTAZAPINE 15 MILLIGRAM(S): 30 TABLET, FILM COATED ORAL at 21:51

## 2025-06-05 RX ADMIN — HEPARIN SODIUM 5000 UNIT(S): 1000 INJECTION INTRAVENOUS; SUBCUTANEOUS at 21:52

## 2025-06-05 RX ADMIN — IPRATROPIUM BROMIDE AND ALBUTEROL SULFATE 3 MILLILITER(S): .5; 2.5 SOLUTION RESPIRATORY (INHALATION) at 14:08

## 2025-06-05 RX ADMIN — FUROSEMIDE 40 MILLIGRAM(S): 10 INJECTION INTRAMUSCULAR; INTRAVENOUS at 06:44

## 2025-06-05 RX ADMIN — IPRATROPIUM BROMIDE AND ALBUTEROL SULFATE 3 MILLILITER(S): .5; 2.5 SOLUTION RESPIRATORY (INHALATION) at 07:46

## 2025-06-05 RX ADMIN — Medication 25 MILLIGRAM(S): at 06:46

## 2025-06-05 RX ADMIN — Medication 0.1 MILLIGRAM(S): at 06:45

## 2025-06-05 RX ADMIN — Medication 40 MILLIGRAM(S): at 06:45

## 2025-06-05 RX ADMIN — IPRATROPIUM BROMIDE AND ALBUTEROL SULFATE 3 MILLILITER(S): .5; 2.5 SOLUTION RESPIRATORY (INHALATION) at 02:05

## 2025-06-05 NOTE — DIETITIAN INITIAL EVALUATION ADULT - ORAL INTAKE PTA/DIET HISTORY
as per pt consumes a regular diet PTA with good po intake. NKFA or intolerances. As per EMR review pt weighed 80 kgs on 12/27/24 vs 80.1 kgs today via bedscale. pt also denies any recent weight changes    presently on a DASH/TLC easy to chew diet, tolerated well consumed >75% of meals

## 2025-06-05 NOTE — DIETITIAN INITIAL EVALUATION ADULT - PERTINENT MEDS FT
MEDICATIONS  (STANDING):  albuterol/ipratropium for Nebulization 3 milliLiter(s) Nebulizer every 6 hours  aspirin enteric coated 81 milliGRAM(s) Oral daily  atorvastatin 80 milliGRAM(s) Oral at bedtime  chlorhexidine 2% Cloths 1 Application(s) Topical <User Schedule>  clopidogrel Tablet 75 milliGRAM(s) Oral daily  escitalopram 20 milliGRAM(s) Oral daily  fludroCORTISONE 0.1 milliGRAM(s) Oral daily  furosemide   Injectable 40 milliGRAM(s) IV Push daily  heparin   Injectable 5000 Unit(s) SubCutaneous every 8 hours  mirtazapine 15 milliGRAM(s) Oral at bedtime  pantoprazole    Tablet 40 milliGRAM(s) Oral before breakfast  spironolactone 25 milliGRAM(s) Oral daily  tamsulosin 0.4 milliGRAM(s) Oral at bedtime    MEDICATIONS  (PRN):  acetaminophen     Tablet .. 650 milliGRAM(s) Oral every 6 hours PRN Temp greater or equal to 38C (100.4F), Mild Pain (1 - 3)  albuterol    90 MICROgram(s) HFA Inhaler 2 Puff(s) Inhalation every 4 hours PRN Shortness of Breath and/or Wheezing  aluminum hydroxide/magnesium hydroxide/simethicone Suspension 30 milliLiter(s) Oral every 4 hours PRN Dyspepsia  ondansetron Injectable 4 milliGRAM(s) IV Push every 8 hours PRN Nausea and/or Vomiting

## 2025-06-05 NOTE — PROGRESS NOTE ADULT - ASSESSMENT
85 yo M with a pmhx of TBI, speech impairment, ex-smoker quit 50 years ago, HTN, HLD, GERD, anemia, splenectomy, skin CA s/p excision and radiation, Hep C (treated), HFrEF, MR/TR/AS, pulmonary htn, orthostatic hypotension, thoracic and lumbar  compression fracture, and CVA  presented to the ED complaining of 3 weeks of leg swelling.  Patient with pulmonary edema and acute on chronic renal failure on arrival.  Palliative care consulted for [facility].    Plan for hospice disposition early next week. Plan is for ongoing medical management and stabilization until that time. Will follow    Plan  -DNR/DNI  -ongoing medical management  -continue IV diuresis  -hospice referral - plan for admission early next week  -rest of care per primary team  -will follow for C    Education about palliative care provided to patient/family.  Discussed with KANDY Avila    Please call [phone] with questions or concerns 24/7.

## 2025-06-05 NOTE — DIETITIAN INITIAL EVALUATION ADULT - NUTRITION DIAGNOSIS
Mohs Case Number: [id number] Date Of Previous Biopsy (Optional): 4/01/2024 Previous Accession (Optional): [id number] Biopsy Photograph Reviewed: Yes Referring Physician (Optional): Dr. Rivas Consent Type: Consent 1 (Standard) Eye Shield Used: No Surgeon Performing Repair (Optional): Eren A. Coronel, MD Initial Size Of Lesion: 3.3 X Size Of Lesion In Cm (Optional): 2.3 Number Of Stages: 2 Primary Defect Length In Cm (Final Defect Size - Required For Flaps/Grafts): 4.5 Primary Defect Width In Cm (Final Defect Size - Required For Flaps/Grafts): 3 Primary Defect Depth In Cm (Optional But Required For Some Insurers): 0 Repair Type: Graft Which Instrument Did You Use For Dermabrasion?: Wire Brush yes... Which Eyelid Repair Cpt Are You Using?: 04367 Oculoplastic Surgeon Procedure Text (A): After obtaining clear surgical margins the patient was sent to oculoplastics for surgical repair.  The patient understands they will receive post-surgical care and follow-up from the referring physician's office. Otolaryngologist Procedure Text (A): After obtaining clear surgical margins the patient was sent to otolaryngology for surgical repair.  The patient understands they will receive post-surgical care and follow-up from the referring physician's office. Plastic Surgeon Procedure Text (A): After obtaining clear surgical margins the patient was sent to plastics for surgical repair.  The patient understands they will receive post-surgical care and follow-up from the referring physician's office. Mid-Level Procedure Text (A): After obtaining clear surgical margins the patient was sent to a mid-level provider for surgical repair.  The patient understands they will receive post-surgical care and follow-up from the mid-level provider. Provider Procedure Text (A): After obtaining clear surgical margins the defect was repaired by another provider. Asc Procedure Text (A): After obtaining clear surgical margins the patient was sent to an ASC for surgical repair.  The patient understands they will receive post-surgical care and follow-up from the ASC physician. Suturegard Retention Suture: 2-0 Nylon Retention Suture Bite Size: 3 mm Length To Time In Minutes Device Was In Place: 10 Number Of Hemigard Strips Per Side: 1 Undermining Type: Entire Wound Debridement Text: The wound edges were debrided prior to proceeding with the closure to facilitate wound healing. Helical Rim Text: The closure involved the helical rim. Vermilion Border Text: The closure involved the vermilion border. Nostril Rim Text: The closure involved the nostril rim. Retention Suture Text: Retention sutures were placed to support the closure and prevent dehiscence. Graft Type: Full Thickness Skin Graft Graft Donor Site: Right Clavicle Area H Indication Text: Tumors in this location are included in Area H (eyelids, eyebrows, nose, lips, chin, ear, pre-auricular, post-auricular, temple, genitalia, hands, feet, ankles and areola).  Tissue conservation is critical in these anatomic locations. Area M Indication Text: Tumors in this location are included in Area M (cheek, forehead, scalp, neck, jawline and pretibial skin).  Mohs surgery is indicated for tumors in these anatomic locations. Area L Indication Text: Tumors in this location are included in Area L (trunk and extremities).  Mohs surgery is indicated for larger tumors, or tumors with aggressive histologic features, in these anatomic locations. Depth Of Tumor Invasion (For Histology): perichondrium Perineural Invasion (For Histology - Be Specific If Possible): absent Surgical Defect Length In Cm (Optional): 4.0 Surgical Defect Width In Cm (Optional): 2.5 Special Stains Stage 1 - Results: Base On Clearance Noted Above Stage 2: Additional Anesthesia Type: 1% lidocaine with epinephrine Surgical Defect Width In Cm (Optional): 3.0 Staging Info: By selecting yes to the question above you will include information on AJCC 8 tumor staging in your Mohs note. Information on tumor staging will be automatically added for SCCs on the head and neck. AJCC 8 includes tumor size, tumor depth, perineural involvement and bone invasion. Tumor Depth: Less than 6mm from granular layer and no invasion beyond the subcutaneous fat Was The Patient On Physician Recommended Anticoagulation Therapy?: Please Select the Appropriate Response Medical Necessity Statement: Based on my medical judgement, Mohs surgery is the most appropriate treatment for this cancer compared to other treatments. Alternatives Discussed Intro (Do Not Add Period): I discussed alternative treatments to Mohs surgery and specifically discussed the risks and benefits of Consent 1/Introductory Paragraph: The rationale for Mohs was explained to the patient and consent was obtained. The risks, benefits and alternatives to therapy were discussed in detail. Specifically, the risks of infection, scarring, bleeding, prolonged wound healing, incomplete removal, allergy to anesthesia, nerve injury and recurrence were addressed. Prior to the procedure, the treatment site was clearly identified and confirmed by the patient. All components of Universal Protocol/PAUSE Rule completed. Consent 2/Introductory Paragraph: Mohs surgery was explained to the patient and consent was obtained. The risks, benefits and alternatives to therapy were discussed in detail. Specifically, the risks of infection, scarring, bleeding, prolonged wound healing, incomplete removal, allergy to anesthesia, nerve injury and recurrence were addressed. Prior to the procedure, the treatment site was clearly identified and confirmed by the patient. All components of Universal Protocol/PAUSE Rule completed. Consent 3/Introductory Paragraph: I gave the patient a chance to ask questions they had about the procedure.  Following this I explained the Mohs procedure and consent was obtained. The risks, benefits and alternatives to therapy were discussed in detail. Specifically, the risks of infection, scarring, bleeding, prolonged wound healing, incomplete removal, allergy to anesthesia, nerve injury and recurrence were addressed. Prior to the procedure, the treatment site was clearly identified and confirmed by the patient. All components of Universal Protocol/PAUSE Rule completed. Consent (Temporal Branch)/Introductory Paragraph: The rationale for Mohs was explained to the patient and consent was obtained. The risks, benefits and alternatives to therapy were discussed in detail. Specifically, the risks of damage to the temporal branch of the facial nerve, infection, scarring, bleeding, prolonged wound healing, incomplete removal, allergy to anesthesia, and recurrence were addressed. Prior to the procedure, the treatment site was clearly identified and confirmed by the patient. All components of Universal Protocol/PAUSE Rule completed. Consent (Marginal Mandibular)/Introductory Paragraph: The rationale for Mohs was explained to the patient and consent was obtained. The risks, benefits and alternatives to therapy were discussed in detail. Specifically, the risks of damage to the marginal mandibular branch of the facial nerve, infection, scarring, bleeding, prolonged wound healing, incomplete removal, allergy to anesthesia, and recurrence were addressed. Prior to the procedure, the treatment site was clearly identified and confirmed by the patient. All components of Universal Protocol/PAUSE Rule completed. Consent (Spinal Accessory)/Introductory Paragraph: The rationale for Mohs was explained to the patient and consent was obtained. The risks, benefits and alternatives to therapy were discussed in detail. Specifically, the risks of damage to the spinal accessory nerve, infection, scarring, bleeding, prolonged wound healing, incomplete removal, allergy to anesthesia, and recurrence were addressed. Prior to the procedure, the treatment site was clearly identified and confirmed by the patient. All components of Universal Protocol/PAUSE Rule completed. Consent (Near Eyelid Margin)/Introductory Paragraph: The rationale for Mohs was explained to the patient and consent was obtained. The risks, benefits and alternatives to therapy were discussed in detail. Specifically, the risks of ectropion or eyelid deformity, infection, scarring, bleeding, prolonged wound healing, incomplete removal, allergy to anesthesia, nerve injury and recurrence were addressed. Prior to the procedure, the treatment site was clearly identified and confirmed by the patient. All components of Universal Protocol/PAUSE Rule completed. Consent (Ear)/Introductory Paragraph: The rationale for Mohs was explained to the patient and consent was obtained. The risks, benefits and alternatives to therapy were discussed in detail. Specifically, the risks of ear deformity, infection, scarring, bleeding, prolonged wound healing, incomplete removal, allergy to anesthesia, nerve injury and recurrence were addressed. Prior to the procedure, the treatment site was clearly identified and confirmed by the patient. All components of Universal Protocol/PAUSE Rule completed. Consent (Nose)/Introductory Paragraph: The rationale for Mohs was explained to the patient and consent was obtained. The risks, benefits and alternatives to therapy were discussed in detail. Specifically, the risks of nasal deformity, changes in the flow of air through the nose, infection, scarring, bleeding, prolonged wound healing, incomplete removal, allergy to anesthesia, nerve injury and recurrence were addressed. Prior to the procedure, the treatment site was clearly identified and confirmed by the patient. All components of Universal Protocol/PAUSE Rule completed. Consent (Lip)/Introductory Paragraph: The rationale for Mohs was explained to the patient and consent was obtained. The risks, benefits and alternatives to therapy were discussed in detail. Specifically, the risks of lip deformity, changes in the oral aperture, infection, scarring, bleeding, prolonged wound healing, incomplete removal, allergy to anesthesia, nerve injury and recurrence were addressed. Prior to the procedure, the treatment site was clearly identified and confirmed by the patient. All components of Universal Protocol/PAUSE Rule completed. Consent (Scalp)/Introductory Paragraph: The rationale for Mohs was explained to the patient and consent was obtained. The risks, benefits and alternatives to therapy were discussed in detail. Specifically, the risks of changes in hair growth pattern secondary to repair, infection, scarring, bleeding, prolonged wound healing, incomplete removal, allergy to anesthesia, nerve injury and recurrence were addressed. Prior to the procedure, the treatment site was clearly identified and confirmed by the patient. All components of Universal Protocol/PAUSE Rule completed. Detail Level: Detailed Postop Diagnosis: same Anesthesia Volume In Cc: 6 Hemostasis: Electrocautery Estimated Blood Loss (Cc): minimal Brow Lift Text: A midfrontal incision was made medially to the defect to allow access to the tissues just superior to the left eyebrow. Following careful dissection inferiorly in a supraperiosteal plane to the level of the left eyebrow, several 3-0 monocryl sutures were used to resuspend the eyebrow orbicularis oculi muscular unit to the superior frontal bone periosteum. This resulted in an appropriate reapproximation of static eyebrow symmetry and correction of the left brow ptosis. Epidermal Sutures: 5-0 Fast Absorbing Gut Epidermal Closure: running and interrupted Suturegard Intro: Intraoperative tissue expansion was performed, utilizing the SUTUREGARD device, in order to reduce wound tension. Suturegard Body: The suture ends were repeatedly re-tightened and re-clamped to achieve the desired tissue expansion. Hemigard Intro: Due to skin fragility and wound tension, it was decided to use HEMIGARD adhesive retention suture devices to permit a linear closure. The skin was cleaned and dried for a 6cm distance away from the wound. Excessive hair, if present, was removed to allow for adhesion. Hemigard Postcare Instructions: The HEMIGARD strips are to remain completely dry for at least 5-7 days. Donor Site Anesthesia Type: same as repair anesthesia Graft Basting Suture (Optional): 5-0 Silk Graft Donor Site Dermal Sutures (Optional): 4-0 Monocryl Graft Donor Site Epidermal Sutures (Optional): 5-0 Ethilon Graft Donor Site Bandage (Optional-Leave Blank If You Don't Want In Note): Steri-strips and a pressure bandage were applied to the donor site. Closure 2 Information: This tab is for additional flaps and grafts, including complex repair and grafts and complex repair and flaps. You can also specify a different location for the additional defect, if the location is the same you do not need to select a new one. We will insert the automated text for the repair you select below just as we do for solitary flaps and grafts. Please note that at this time if you select a location with a different insurance zone you will need to override the ICD10 and CPT if appropriate. Closure 3 Information: This tab is for additional flaps and grafts above and beyond our usual structured repairs.  Please note if you enter information here it will not currently bill and you will need to add the billing information manually. Wound Care: Aquaphor Dressing: pressure dressing with telfa Wound Care (No Sutures): Petrolatum Dressing (No Sutures): dry sterile dressing Unna Boot Text: An Unna boot was placed to help immobilize the limb and facilitate more rapid healing. Home Suture Removal Text: Patient was provided instructions on removing sutures and will remove their sutures at home.  If they have any questions or difficulties they will call the office. Post-Care Instructions: I reviewed with the patient in detail post-care instructions. Patient is not to engage in any heavy lifting, exercise, or swimming for the next 14 days. Should the patient develop any fevers, chills, bleeding, severe pain patient will contact the office immediately. Pain Refusal Text: I offered to prescribe pain medication but the patient refused to take this medication. Mauc Instructions: By selecting yes to the question below the MAUC number will be added into the note.  This will be calculated automatically based on the diagnosis chosen, the size entered, the body zone selected (H,M,L) and the specific indications you chose. You will also have the option to override the Mohs AUC if you disagree with the automatically calculated number and this option is found in the Case Summary tab. Where Do You Want The Question To Include Opioid Counseling Located?: Case Summary Tab Eye Protection Verbiage: Before proceeding with the stage, a plastic scleral shield was inserted. The globe was anesthetized with a few drops of 1% lidocaine with 1:100,000 epinephrine. Then, an appropriate sized scleral shield was chosen and coated with lacrilube ointment. The shield was gently inserted and left in place for the duration of each stage. After the stage was completed, the shield was gently removed. Mohs Method Verbiage: An incision at a 45 degree angle following the standard Mohs approach was done and the specimen was harvested as a microscopic controlled layer. Surgeon/Pathologist Verbiage (Will Incorporate Name Of Surgeon From Intro If Not Blank): operated in two distinct and integrated capacities as the surgeon and pathologist. Mohs Histo Method Verbiage: Each section was then chromacoded and processed in the Mohs lab using the Mohs protocol and submitted for frozen section. Subsequent Stages Histo Method Verbiage: Using a similar technique to that described above, a thin layer of tissue was removed from all areas where tumor was visible on the previous stage.  The tissue was again oriented, mapped, dyed, and processed as above. Mohs Rapid Report Verbiage: The area of clinically evident tumor was marked with skin marking ink and appropriately hatched.  The initial incision was made following the Mohs approach through the skin.  The specimen was taken to the lab, divided into the necessary number of pieces, chromacoded and processed according to the Mohs protocol.  This was repeated in successive stages until a tumor free defect was achieved. Complex Repair Preamble Text (Leave Blank If You Do Not Want): Extensive wide undermining was performed. Intermediate Repair Preamble Text (Leave Blank If You Do Not Want): Undermining was performed with blunt dissection. Non-Graft Cartilage Fenestration Text: The cartilage was fenestrated with a 2mm punch biopsy to help facilitate healing. Graft Cartilage Fenestration Text: The cartilage was fenestrated with a 2mm punch biopsy to help facilitate graft survival and healing. Secondary Intention Text (Leave Blank If You Do Not Want): The defect will heal with secondary intention. No Repair - Repaired With Adjacent Surgical Defect Text (Leave Blank If You Do Not Want): After obtaining clear surgical margins the defect was repaired concurrently with another surgical defect which was in close approximation. Adjacent Tissue Transfer Text: The defect edges were debeveled with a #15 scalpel blade.  Given the location of the defect and the proximity to free margins an adjacent tissue transfer was deemed most appropriate.  Using a sterile surgical marker, an appropriate flap was drawn incorporating the defect and placing the expected incisions within the relaxed skin tension lines where possible.    The area thus outlined was incised deep to adipose tissue with a #15 scalpel blade.  The skin margins were undermined to an appropriate distance in all directions utilizing iris scissors. Advancement Flap (Single) Text: The defect edges were debeveled with a #15 scalpel blade.  Given the location of the defect and the proximity to free margins a single advancement flap was deemed most appropriate.  Using a sterile surgical marker, an appropriate advancement flap was drawn incorporating the defect and placing the expected incisions within the relaxed skin tension lines where possible.    The area thus outlined was incised deep to adipose tissue with a #15 scalpel blade.  The skin margins were undermined to an appropriate distance in all directions utilizing iris scissors. Advancement Flap (Double) Text: The defect edges were debeveled with a #15 scalpel blade.  Given the location of the defect and the proximity to free margins a double advancement flap was deemed most appropriate.  Using a sterile surgical marker, the appropriate advancement flaps were drawn incorporating the defect and placing the expected incisions within the relaxed skin tension lines where possible.    The area thus outlined was incised deep to adipose tissue with a #15 scalpel blade.  The skin margins were undermined to an appropriate distance in all directions utilizing iris scissors. Burow's Advancement Flap Text: The defect edges were debeveled with a #15 scalpel blade.  Given the location of the defect and the proximity to free margins a Burow's advancement flap was deemed most appropriate.  Using a sterile surgical marker, the appropriate advancement flap was drawn incorporating the defect and placing the expected incisions within the relaxed skin tension lines where possible.    The area thus outlined was incised deep to adipose tissue with a #15 scalpel blade.  The skin margins were undermined to an appropriate distance in all directions utilizing iris scissors. Chonodrocutaneous Helical Advancement Flap Text: The defect edges were debeveled with a #15 scalpel blade.  Given the location of the defect and the proximity to free margins a chondrocutaneous helical advancement flap was deemed most appropriate.  Using a sterile surgical marker, the appropriate advancement flap was drawn incorporating the defect and placing the expected incisions within the relaxed skin tension lines where possible.    The area thus outlined was incised deep to adipose tissue with a #15 scalpel blade.  The skin margins were undermined to an appropriate distance in all directions utilizing iris scissors. Crescentic Advancement Flap Text: The defect edges were debeveled with a #15 scalpel blade.  Given the location of the defect and the proximity to free margins a crescentic advancement flap was deemed most appropriate.  Using a sterile surgical marker, the appropriate advancement flap was drawn incorporating the defect and placing the expected incisions within the relaxed skin tension lines where possible.    The area thus outlined was incised deep to adipose tissue with a #15 scalpel blade.  The skin margins were undermined to an appropriate distance in all directions utilizing iris scissors. A-T Advancement Flap Text: The defect edges were debeveled with a #15 scalpel blade.  Given the location of the defect, shape of the defect and the proximity to free margins an A-T advancement flap was deemed most appropriate.  Using a sterile surgical marker, an appropriate advancement flap was drawn incorporating the defect and placing the expected incisions within the relaxed skin tension lines where possible.    The area thus outlined was incised deep to adipose tissue with a #15 scalpel blade.  The skin margins were undermined to an appropriate distance in all directions utilizing iris scissors. O-T Advancement Flap Text: The defect edges were debeveled with a #15 scalpel blade.  Given the location of the defect, shape of the defect and the proximity to free margins an O-T advancement flap was deemed most appropriate.  Using a sterile surgical marker, an appropriate advancement flap was drawn incorporating the defect and placing the expected incisions within the relaxed skin tension lines where possible.    The area thus outlined was incised deep to adipose tissue with a #15 scalpel blade.  The skin margins were undermined to an appropriate distance in all directions utilizing iris scissors. O-L Flap Text: The defect edges were debeveled with a #15 scalpel blade.  Given the location of the defect, shape of the defect and the proximity to free margins an O-L flap was deemed most appropriate.  Using a sterile surgical marker, an appropriate advancement flap was drawn incorporating the defect and placing the expected incisions within the relaxed skin tension lines where possible.    The area thus outlined was incised deep to adipose tissue with a #15 scalpel blade.  The skin margins were undermined to an appropriate distance in all directions utilizing iris scissors. O-Z Flap Text: The defect edges were debeveled with a #15 scalpel blade.  Given the location of the defect, shape of the defect and the proximity to free margins an O-Z flap was deemed most appropriate.  Using a sterile surgical marker, an appropriate transposition flap was drawn incorporating the defect and placing the expected incisions within the relaxed skin tension lines where possible. The area thus outlined was incised deep to adipose tissue with a #15 scalpel blade.  The skin margins were undermined to an appropriate distance in all directions utilizing iris scissors. Double O-Z Flap Text: The defect edges were debeveled with a #15 scalpel blade.  Given the location of the defect, shape of the defect and the proximity to free margins a Double O-Z flap was deemed most appropriate.  Using a sterile surgical marker, an appropriate transposition flap was drawn incorporating the defect and placing the expected incisions within the relaxed skin tension lines where possible. The area thus outlined was incised deep to adipose tissue with a #15 scalpel blade.  The skin margins were undermined to an appropriate distance in all directions utilizing iris scissors. V-Y Flap Text: The defect edges were debeveled with a #15 scalpel blade.  Given the location of the defect, shape of the defect and the proximity to free margins a V-Y flap was deemed most appropriate.  Using a sterile surgical marker, an appropriate advancement flap was drawn incorporating the defect and placing the expected incisions within the relaxed skin tension lines where possible.    The area thus outlined was incised deep to adipose tissue with a #15 scalpel blade.  The skin margins were undermined to an appropriate distance in all directions utilizing iris scissors. Advancement-Rotation Flap Text: The defect edges were debeveled with a #15 scalpel blade.  Given the location of the defect, shape of the defect and the proximity to free margins an advancement-rotation flap was deemed most appropriate.  Using a sterile surgical marker, an appropriate flap was drawn incorporating the defect and placing the expected incisions within the relaxed skin tension lines where possible. The area thus outlined was incised deep to adipose tissue with a #15 scalpel blade.  The skin margins were undermined to an appropriate distance in all directions utilizing iris scissors. Mercedes Flap Text: The defect edges were debeveled with a #15 scalpel blade.  Given the location of the defect, shape of the defect and the proximity to free margins a Mercedes flap was deemed most appropriate.  Using a sterile surgical marker, an appropriate advancement flap was drawn incorporating the defect and placing the expected incisions within the relaxed skin tension lines where possible. The area thus outlined was incised deep to adipose tissue with a #15 scalpel blade.  The skin margins were undermined to an appropriate distance in all directions utilizing iris scissors. Modified Advancement Flap Text: The defect edges were debeveled with a #15 scalpel blade.  Given the location of the defect, shape of the defect and the proximity to free margins a modified advancement flap was deemed most appropriate.  Using a sterile surgical marker, an appropriate advancement flap was drawn incorporating the defect and placing the expected incisions within the relaxed skin tension lines where possible.    The area thus outlined was incised deep to adipose tissue with a #15 scalpel blade.  The skin margins were undermined to an appropriate distance in all directions utilizing iris scissors. Mucosal Advancement Flap Text: Given the location of the defect, shape of the defect and the proximity to free margins a mucosal advancement flap was deemed most appropriate. Incisions were made with a 15 blade scalpel in the appropriate fashion along the cutaneous vermilion border and the mucosal lip. The remaining actinically damaged mucosal tissue was excised.  The mucosal advancement flap was then elevated to the gingival sulcus with care taken to preserve the neurovascular structures and advanced into the primary defect. Care was taken to ensure that precise realignment of the vermilion border was achieved. Peng Advancement Flap Text: The defect edges were debeveled with a #15 scalpel blade.  Given the location of the defect, shape of the defect and the proximity to free margins a Peng advancement flap was deemed most appropriate.  Using a sterile surgical marker, an appropriate advancement flap was drawn incorporating the defect and placing the expected incisions within the relaxed skin tension lines where possible. The area thus outlined was incised deep to adipose tissue with a #15 scalpel blade.  The skin margins were undermined to an appropriate distance in all directions utilizing iris scissors. Hatchet Flap Text: The defect edges were debeveled with a #15 scalpel blade.  Given the location of the defect, shape of the defect and the proximity to free margins a hatchet flap was deemed most appropriate.  Using a sterile surgical marker, an appropriate hatchet flap was drawn incorporating the defect and placing the expected incisions within the relaxed skin tension lines where possible.    The area thus outlined was incised deep to adipose tissue with a #15 scalpel blade.  The skin margins were undermined to an appropriate distance in all directions utilizing iris scissors. Rotation Flap Text: The defect edges were debeveled with a #15 scalpel blade.  Given the location of the defect, shape of the defect and the proximity to free margins a rotation flap was deemed most appropriate.  Using a sterile surgical marker, an appropriate rotation flap was drawn incorporating the defect and placing the expected incisions within the relaxed skin tension lines where possible.    The area thus outlined was incised deep to adipose tissue with a #15 scalpel blade.  The skin margins were undermined to an appropriate distance in all directions utilizing iris scissors. Bilateral Rotation Flap Text: The defect edges were debeveled with a #15 scalpel blade. Given the location of the defect, shape of the defect and the proximity to free margins a bilateral rotation flap was deemed most appropriate. Using a sterile surgical marker, an appropriate rotation flap was drawn incorporating the defect and placing the expected incisions within the relaxed skin tension lines where possible. The area thus outlined was incised deep to adipose tissue with a #15 scalpel blade. The skin margins were undermined to an appropriate distance in all directions utilizing iris scissors. Following this, the designed flap was carried over into the primary defect and sutured into place. Spiral Flap Text: The defect edges were debeveled with a #15 scalpel blade.  Given the location of the defect, shape of the defect and the proximity to free margins a spiral flap was deemed most appropriate.  Using a sterile surgical marker, an appropriate rotation flap was drawn incorporating the defect and placing the expected incisions within the relaxed skin tension lines where possible. The area thus outlined was incised deep to adipose tissue with a #15 scalpel blade.  The skin margins were undermined to an appropriate distance in all directions utilizing iris scissors. Staged Advancement Flap Text: The defect edges were debeveled with a #15 scalpel blade.  Given the location of the defect, shape of the defect and the proximity to free margins a staged advancement flap was deemed most appropriate.  Using a sterile surgical marker, an appropriate advancement flap was drawn incorporating the defect and placing the expected incisions within the relaxed skin tension lines where possible. The area thus outlined was incised deep to adipose tissue with a #15 scalpel blade.  The skin margins were undermined to an appropriate distance in all directions utilizing iris scissors. Star Wedge Flap Text: The defect edges were debeveled with a #15 scalpel blade.  Given the location of the defect, shape of the defect and the proximity to free margins a star wedge flap was deemed most appropriate.  Using a sterile surgical marker, an appropriate rotation flap was drawn incorporating the defect and placing the expected incisions within the relaxed skin tension lines where possible. The area thus outlined was incised deep to adipose tissue with a #15 scalpel blade.  The skin margins were undermined to an appropriate distance in all directions utilizing iris scissors. Transposition Flap Text: The defect edges were debeveled with a #15 scalpel blade.  Given the location of the defect and the proximity to free margins a transposition flap was deemed most appropriate.  Using a sterile surgical marker, an appropriate transposition flap was drawn incorporating the defect.    The area thus outlined was incised deep to adipose tissue with a #15 scalpel blade.  The skin margins were undermined to an appropriate distance in all directions utilizing iris scissors. Muscle Hinge Flap Text: The defect edges were debeveled with a #15 scalpel blade.  Given the size, depth and location of the defect and the proximity to free margins a muscle hinge flap was deemed most appropriate.  Using a sterile surgical marker, an appropriate hinge flap was drawn incorporating the defect. The area thus outlined was incised with a #15 scalpel blade.  The skin margins were undermined to an appropriate distance in all directions utilizing iris scissors. Mustarde Flap Text: The defect edges were debeveled with a #15 scalpel blade.  Given the size, depth and location of the defect and the proximity to free margins a Mustarde flap was deemed most appropriate.  Using a sterile surgical marker, an appropriate flap was drawn incorporating the defect. The area thus outlined was incised with a #15 scalpel blade.  The skin margins were undermined to an appropriate distance in all directions utilizing iris scissors. Nasal Turnover Hinge Flap Text: The defect edges were debeveled with a #15 scalpel blade.  Given the size, depth, location of the defect and the defect being full thickness a nasal turnover hinge flap was deemed most appropriate.  Using a sterile surgical marker, an appropriate hinge flap was drawn incorporating the defect. The area thus outlined was incised with a #15 scalpel blade. The flap was designed to recreate the nasal mucosal lining and the alar rim. The skin margins were undermined to an appropriate distance in all directions utilizing iris scissors. Nasalis-Muscle-Based Myocutaneous Island Pedicle Flap Text: Using a #15 blade, an incision was made around the donor flap to the level of the nasalis muscle. Wide lateral undermining was then performed in both the subcutaneous plane above the nasalis muscle, and in a submuscular plane just above periosteum. This allowed the formation of a free nasalis muscle axial pedicle (based on the angular artery) which was still attached to the actual cutaneous flap, increasing its mobility and vascular viability. Hemostasis was obtained with pinpoint electrocoagulation. The flap was mobilized into position and the pivotal anchor points positioned and stabilized with buried interrupted sutures. Subcutaneous and dermal tissues were closed in a multilayered fashion with sutures. Tissue redundancies were excised, and the epidermal edges were apposed without significant tension and sutured with sutures. Nasalis Myocutaneous Flap Text: Using a #15 blade, an incision was made around the donor flap to the level of the nasalis muscle. Wide lateral undermining was then performed in both the subcutaneous plane above the nasalis muscle, and in a submuscular plane just above periosteum. This allowed the formation of a free nasalis muscle axial pedicle which was still attached to the actual cutaneous flap, increasing its mobility and vascular viability. Hemostasis was obtained with pinpoint electrocoagulation. The flap was mobilized into position and the pivotal anchor points positioned and stabilized with buried interrupted sutures. Subcutaneous and dermal tissues were closed in a multilayered fashion with sutures. Tissue redundancies were excised, and the epidermal edges were apposed without significant tension and sutured with sutures. Orbicularis Oris Muscle Flap Text: The defect edges were debeveled with a #15 scalpel blade.  Given that the defect affected the competency of the oral sphincter an orbicularis oris muscle flap was deemed most appropriate to restore this competency and normal muscle function.  Using a sterile surgical marker, an appropriate flap was drawn incorporating the defect. The area thus outlined was incised with a #15 scalpel blade. Melolabial Transposition Flap Text: The defect edges were debeveled with a #15 scalpel blade.  Given the location of the defect and the proximity to free margins a melolabial flap was deemed most appropriate.  Using a sterile surgical marker, an appropriate melolabial transposition flap was drawn incorporating the defect.    The area thus outlined was incised deep to adipose tissue with a #15 scalpel blade.  The skin margins were undermined to an appropriate distance in all directions utilizing iris scissors. Rectangular Flap Text: The defect edges were debeveled with a #15 scalpel blade. Given the location of the defect and the proximity to free margins a rectangular flap was deemed most appropriate. Using a sterile surgical marker, an appropriate rectangular flap was drawn incorporating the defect. The area thus outlined was incised deep to adipose tissue with a #15 scalpel blade. The skin margins were undermined to an appropriate distance in all directions utilizing iris scissors. Following this, the designed flap was carried over into the primary defect and sutured into place. Rhombic Flap Text: The defect edges were debeveled with a #15 scalpel blade.  Given the location of the defect and the proximity to free margins a rhombic flap was deemed most appropriate.  Using a sterile surgical marker, an appropriate rhombic flap was drawn incorporating the defect.    The area thus outlined was incised deep to adipose tissue with a #15 scalpel blade.  The skin margins were undermined to an appropriate distance in all directions utilizing iris scissors. Rhomboid Transposition Flap Text: The defect edges were debeveled with a #15 scalpel blade.  Given the location of the defect and the proximity to free margins a rhomboid transposition flap was deemed most appropriate.  Using a sterile surgical marker, an appropriate rhomboid flap was drawn incorporating the defect.    The area thus outlined was incised deep to adipose tissue with a #15 scalpel blade.  The skin margins were undermined to an appropriate distance in all directions utilizing iris scissors. Bi-Rhombic Flap Text: The defect edges were debeveled with a #15 scalpel blade.  Given the location of the defect and the proximity to free margins a bi-rhombic flap was deemed most appropriate.  Using a sterile surgical marker, an appropriate rhombic flap was drawn incorporating the defect. The area thus outlined was incised deep to adipose tissue with a #15 scalpel blade.  The skin margins were undermined to an appropriate distance in all directions utilizing iris scissors. Helical Rim Advancement Flap Text: The defect edges were debeveled with a #15 blade scalpel.  Given the location of the defect and the proximity to free margins (helical rim) a double helical rim advancement flap was deemed most appropriate.  Using a sterile surgical marker, the appropriate advancement flaps were drawn incorporating the defect and placing the expected incisions between the helical rim and antihelix where possible.  The area thus outlined was incised through and through with a #15 scalpel blade.  With a skin hook and iris scissors, the flaps were gently and sharply undermined and freed up. Bilateral Helical Rim Advancement Flap Text: The defect edges were debeveled with a #15 blade scalpel.  Given the location of the defect and the proximity to free margins (helical rim) a bilateral helical rim advancement flap was deemed most appropriate.  Using a sterile surgical marker, the appropriate advancement flaps were drawn incorporating the defect and placing the expected incisions between the helical rim and antihelix where possible.  The area thus outlined was incised through and through with a #15 scalpel blade.  With a skin hook and iris scissors, the flaps were gently and sharply undermined and freed up. Ear Star Wedge Flap Text: The defect edges were debeveled with a #15 blade scalpel.  Given the location of the defect and the proximity to free margins (helical rim) an ear star wedge flap was deemed most appropriate.  Using a sterile surgical marker, the appropriate flap was drawn incorporating the defect and placing the expected incisions between the helical rim and antihelix where possible.  The area thus outlined was incised through and through with a #15 scalpel blade. Banner Transposition Flap Text: The defect edges were debeveled with a #15 scalpel blade.  Given the location of the defect and the proximity to free margins a Banner transposition flap was deemed most appropriate.  Using a sterile surgical marker, an appropriate flap drawn around the defect. The area thus outlined was incised deep to adipose tissue with a #15 scalpel blade.  The skin margins were undermined to an appropriate distance in all directions utilizing iris scissors. Bilobed Flap Text: The defect edges were debeveled with a #15 scalpel blade.  Given the location of the defect and the proximity to free margins a bilobe flap was deemed most appropriate.  Using a sterile surgical marker, an appropriate bilobe flap drawn around the defect.    The area thus outlined was incised deep to adipose tissue with a #15 scalpel blade.  The skin margins were undermined to an appropriate distance in all directions utilizing iris scissors. Bilobed Transposition Flap Text: The defect edges were debeveled with a #15 scalpel blade.  Given the location of the defect and the proximity to free margins a bilobed transposition flap was deemed most appropriate.  Using a sterile surgical marker, an appropriate bilobe flap drawn around the defect.    The area thus outlined was incised deep to adipose tissue with a #15 scalpel blade.  The skin margins were undermined to an appropriate distance in all directions utilizing iris scissors. Trilobed Flap Text: The defect edges were debeveled with a #15 scalpel blade.  Given the location of the defect and the proximity to free margins a trilobed flap was deemed most appropriate.  Using a sterile surgical marker, an appropriate trilobed flap drawn around the defect.    The area thus outlined was incised deep to adipose tissue with a #15 scalpel blade.  The skin margins were undermined to an appropriate distance in all directions utilizing iris scissors. Dorsal Nasal Flap Text: The defect edges were debeveled with a #15 scalpel blade.  Given the location of the defect and the proximity to free margins a dorsal nasal flap was deemed most appropriate.  Using a sterile surgical marker, an appropriate dorsal nasal flap was drawn around the defect.    The area thus outlined was incised deep to adipose tissue with a #15 scalpel blade.  The skin margins were undermined to an appropriate distance in all directions utilizing iris scissors. Island Pedicle Flap Text: The defect edges were debeveled with a #15 scalpel blade.  Given the location of the defect, shape of the defect and the proximity to free margins an island pedicle advancement flap was deemed most appropriate.  Using a sterile surgical marker, an appropriate advancement flap was drawn incorporating the defect, outlining the appropriate donor tissue and placing the expected incisions within the relaxed skin tension lines where possible.    The area thus outlined was incised deep to adipose tissue with a #15 scalpel blade.  The skin margins were undermined to an appropriate distance in all directions around the primary defect and laterally outward around the island pedicle utilizing iris scissors.  There was minimal undermining beneath the pedicle flap. Island Pedicle Flap With Canthal Suspension Text: The defect edges were debeveled with a #15 scalpel blade.  Given the location of the defect, shape of the defect and the proximity to free margins an island pedicle advancement flap was deemed most appropriate.  Using a sterile surgical marker, an appropriate advancement flap was drawn incorporating the defect, outlining the appropriate donor tissue and placing the expected incisions within the relaxed skin tension lines where possible. The area thus outlined was incised deep to adipose tissue with a #15 scalpel blade.  The skin margins were undermined to an appropriate distance in all directions around the primary defect and laterally outward around the island pedicle utilizing iris scissors.  There was minimal undermining beneath the pedicle flap. A suspension suture was placed in the canthal tendon to prevent tension and prevent ectropion. Alar Island Pedicle Flap Text: The defect edges were debeveled with a #15 scalpel blade.  Given the location of the defect, shape of the defect and the proximity to the alar rim an island pedicle advancement flap was deemed most appropriate.  Using a sterile surgical marker, an appropriate advancement flap was drawn incorporating the defect, outlining the appropriate donor tissue and placing the expected incisions within the nasal ala running parallel to the alar rim. The area thus outlined was incised with a #15 scalpel blade.  The skin margins were undermined minimally to an appropriate distance in all directions around the primary defect and laterally outward around the island pedicle utilizing iris scissors.  There was minimal undermining beneath the pedicle flap. Double Island Pedicle Flap Text: The defect edges were debeveled with a #15 scalpel blade.  Given the location of the defect, shape of the defect and the proximity to free margins a double island pedicle advancement flap was deemed most appropriate.  Using a sterile surgical marker, an appropriate advancement flap was drawn incorporating the defect, outlining the appropriate donor tissue and placing the expected incisions within the relaxed skin tension lines where possible.    The area thus outlined was incised deep to adipose tissue with a #15 scalpel blade.  The skin margins were undermined to an appropriate distance in all directions around the primary defect and laterally outward around the island pedicle utilizing iris scissors.  There was minimal undermining beneath the pedicle flap. Island Pedicle Flap-Requiring Vessel Identification Text: The defect edges were debeveled with a #15 scalpel blade.  Given the location of the defect, shape of the defect and the proximity to free margins an island pedicle advancement flap was deemed most appropriate.  Using a sterile surgical marker, an appropriate advancement flap was drawn, based on the axial vessel mentioned above, incorporating the defect, outlining the appropriate donor tissue and placing the expected incisions within the relaxed skin tension lines where possible.    The area thus outlined was incised deep to adipose tissue with a #15 scalpel blade.  The skin margins were undermined to an appropriate distance in all directions around the primary defect and laterally outward around the island pedicle utilizing iris scissors.  There was minimal undermining beneath the pedicle flap. Keystone Flap Text: The defect edges were debeveled with a #15 scalpel blade.  Given the location of the defect, shape of the defect a keystone flap was deemed most appropriate.  Using a sterile surgical marker, an appropriate keystone flap was drawn incorporating the defect, outlining the appropriate donor tissue and placing the expected incisions within the relaxed skin tension lines where possible. The area thus outlined was incised deep to adipose tissue with a #15 scalpel blade.  The skin margins were undermined to an appropriate distance in all directions around the primary defect and laterally outward around the flap utilizing iris scissors. O-T Plasty Text: The defect edges were debeveled with a #15 scalpel blade.  Given the location of the defect, shape of the defect and the proximity to free margins an O-T plasty was deemed most appropriate.  Using a sterile surgical marker, an appropriate O-T plasty was drawn incorporating the defect and placing the expected incisions within the relaxed skin tension lines where possible.    The area thus outlined was incised deep to adipose tissue with a #15 scalpel blade.  The skin margins were undermined to an appropriate distance in all directions utilizing iris scissors. O-Z Plasty Text: The defect edges were debeveled with a #15 scalpel blade.  Given the location of the defect, shape of the defect and the proximity to free margins an O-Z plasty (double transposition flap) was deemed most appropriate.  Using a sterile surgical marker, the appropriate transposition flaps were drawn incorporating the defect and placing the expected incisions within the relaxed skin tension lines where possible.    The area thus outlined was incised deep to adipose tissue with a #15 scalpel blade.  The skin margins were undermined to an appropriate distance in all directions utilizing iris scissors.  Hemostasis was achieved with electrocautery.  The flaps were then transposed into place, one clockwise and the other counterclockwise, and anchored with interrupted buried subcutaneous sutures. Double O-Z Plasty Text: The defect edges were debeveled with a #15 scalpel blade.  Given the location of the defect, shape of the defect and the proximity to free margins a Double O-Z plasty (double transposition flap) was deemed most appropriate.  Using a sterile surgical marker, the appropriate transposition flaps were drawn incorporating the defect and placing the expected incisions within the relaxed skin tension lines where possible. The area thus outlined was incised deep to adipose tissue with a #15 scalpel blade.  The skin margins were undermined to an appropriate distance in all directions utilizing iris scissors.  Hemostasis was achieved with electrocautery.  The flaps were then transposed into place, one clockwise and the other counterclockwise, and anchored with interrupted buried subcutaneous sutures. V-Y Plasty Text: The defect edges were debeveled with a #15 scalpel blade.  Given the location of the defect, shape of the defect and the proximity to free margins an V-Y advancement flap was deemed most appropriate.  Using a sterile surgical marker, an appropriate advancement flap was drawn incorporating the defect and placing the expected incisions within the relaxed skin tension lines where possible.    The area thus outlined was incised deep to adipose tissue with a #15 scalpel blade.  The skin margins were undermined to an appropriate distance in all directions utilizing iris scissors. H Plasty Text: Given the location of the defect, shape of the defect and the proximity to free margins a H-plasty was deemed most appropriate for repair.  Using a sterile surgical marker, the appropriate advancement arms of the H-plasty were drawn incorporating the defect and placing the expected incisions within the relaxed skin tension lines where possible. The area thus outlined was incised deep to adipose tissue with a #15 scalpel blade. The skin margins were undermined to an appropriate distance in all directions utilizing iris scissors.  The opposing advancement arms were then advanced into place in opposite direction and anchored with interrupted buried subcutaneous sutures. W Plasty Text: The lesion was extirpated to the level of the fat with a #15 scalpel blade.  Given the location of the defect, shape of the defect and the proximity to free margins a W-plasty was deemed most appropriate for repair.  Using a sterile surgical marker, the appropriate transposition arms of the W-plasty were drawn incorporating the defect and placing the expected incisions within the relaxed skin tension lines where possible.    The area thus outlined was incised deep to adipose tissue with a #15 scalpel blade.  The skin margins were undermined to an appropriate distance in all directions utilizing iris scissors.  The opposing transposition arms were then transposed into place in opposite direction and anchored with interrupted buried subcutaneous sutures. Z Plasty Text: The lesion was extirpated to the level of the fat with a #15 scalpel blade.  Given the location of the defect, shape of the defect and the proximity to free margins a Z-plasty was deemed most appropriate for repair.  Using a sterile surgical marker, the appropriate transposition arms of the Z-plasty were drawn incorporating the defect and placing the expected incisions within the relaxed skin tension lines where possible.    The area thus outlined was incised deep to adipose tissue with a #15 scalpel blade.  The skin margins were undermined to an appropriate distance in all directions utilizing iris scissors.  The opposing transposition arms were then transposed into place in opposite direction and anchored with interrupted buried subcutaneous sutures. Double Z Plasty Text: The lesion was extirpated to the level of the fat with a #15 scalpel blade. Given the location of the defect, shape of the defect and the proximity to free margins a double Z-plasty was deemed most appropriate for repair. Using a sterile surgical marker, the appropriate transposition arms of the double Z-plasty were drawn incorporating the defect and placing the expected incisions within the relaxed skin tension lines where possible. The area thus outlined was incised deep to adipose tissue with a #15 scalpel blade. The skin margins were undermined to an appropriate distance in all directions utilizing iris scissors. The opposing transposition arms were then transposed and carried over into place in opposite direction and anchored with interrupted buried subcutaneous sutures. Zygomaticofacial Flap Text: Given the location of the defect, shape of the defect and the proximity to free margins a zygomaticofacial flap was deemed most appropriate for repair.  Using a sterile surgical marker, the appropriate flap was drawn incorporating the defect and placing the expected incisions within the relaxed skin tension lines where possible. The area thus outlined was incised deep to adipose tissue with a #15 scalpel blade with preservation of a vascular pedicle.  The skin margins were undermined to an appropriate distance in all directions utilizing iris scissors.  The flap was then placed into the defect and anchored with interrupted buried subcutaneous sutures. Cheek Interpolation Flap Text: A decision was made to reconstruct the defect utilizing an interpolation axial flap and a staged reconstruction.  A telfa template was made of the defect.  This telfa template was then used to outline the Cheek Interpolation flap.  The donor area for the pedicle flap was then injected with anesthesia.  The flap was excised through the skin and subcutaneous tissue down to the layer of the underlying musculature.  The interpolation flap was carefully excised within this deep plane to maintain its blood supply.  The edges of the donor site were undermined.   The donor site was closed in a primary fashion.  The pedicle was then rotated into position and sutured.  Once the tube was sutured into place, adequate blood supply was confirmed with blanching and refill.  The pedicle was then wrapped with xeroform gauze and dressed appropriately with a telfa and gauze bandage to ensure continued blood supply and protect the attached pedicle. Cheek-To-Nose Interpolation Flap Text: A decision was made to reconstruct the defect utilizing an interpolation axial flap and a staged reconstruction.  A telfa template was made of the defect.  This telfa template was then used to outline the Cheek-To-Nose Interpolation flap.  The donor area for the pedicle flap was then injected with anesthesia.  The flap was excised through the skin and subcutaneous tissue down to the layer of the underlying musculature.  The interpolation flap was carefully excised within this deep plane to maintain its blood supply.  The edges of the donor site were undermined.   The donor site was closed in a primary fashion.  The pedicle was then rotated into position and sutured.  Once the tube was sutured into place, adequate blood supply was confirmed with blanching and refill.  The pedicle was then wrapped with xeroform gauze and dressed appropriately with a telfa and gauze bandage to ensure continued blood supply and protect the attached pedicle. Interpolation Flap Text: A decision was made to reconstruct the defect utilizing an interpolation axial flap and a staged reconstruction.  A telfa template was made of the defect.  This telfa template was then used to outline the interpolation flap.  The donor area for the pedicle flap was then injected with anesthesia.  The flap was excised through the skin and subcutaneous tissue down to the layer of the underlying musculature.  The interpolation flap was carefully excised within this deep plane to maintain its blood supply.  The edges of the donor site were undermined.   The donor site was closed in a primary fashion.  The pedicle was then rotated into position and sutured.  Once the tube was sutured into place, adequate blood supply was confirmed with blanching and refill.  The pedicle was then wrapped with xeroform gauze and dressed appropriately with a telfa and gauze bandage to ensure continued blood supply and protect the attached pedicle. Melolabial Interpolation Flap Text: A decision was made to reconstruct the defect utilizing an interpolation axial flap and a staged reconstruction.  A telfa template was made of the defect.  This telfa template was then used to outline the melolabial interpolation flap.  The donor area for the pedicle flap was then injected with anesthesia.  The flap was excised through the skin and subcutaneous tissue down to the layer of the underlying musculature.  The pedicle flap was carefully excised within this deep plane to maintain its blood supply.  The edges of the donor site were undermined.   The donor site was closed in a primary fashion.  The pedicle was then rotated into position and sutured.  Once the tube was sutured into place, adequate blood supply was confirmed with blanching and refill.  The pedicle was then wrapped with xeroform gauze and dressed appropriately with a telfa and gauze bandage to ensure continued blood supply and protect the attached pedicle. Mastoid Interpolation Flap Text: A decision was made to reconstruct the defect utilizing an interpolation axial flap and a staged reconstruction.  A telfa template was made of the defect.  This telfa template was then used to outline the mastoid interpolation flap.  The donor area for the pedicle flap was then injected with anesthesia.  The flap was excised through the skin and subcutaneous tissue down to the layer of the underlying musculature.  The pedicle flap was carefully excised within this deep plane to maintain its blood supply.  The edges of the donor site were undermined.   The donor site was closed in a primary fashion.  The pedicle was then rotated into position and sutured.  Once the tube was sutured into place, adequate blood supply was confirmed with blanching and refill.  The pedicle was then wrapped with xeroform gauze and dressed appropriately with a telfa and gauze bandage to ensure continued blood supply and protect the attached pedicle. Posterior Auricular Interpolation Flap Text: A decision was made to reconstruct the defect utilizing an interpolation axial flap and a staged reconstruction.  A telfa template was made of the defect.  This telfa template was then used to outline the posterior auricular interpolation flap.  The donor area for the pedicle flap was then injected with anesthesia.  The flap was excised through the skin and subcutaneous tissue down to the layer of the underlying musculature.  The pedicle flap was carefully excised within this deep plane to maintain its blood supply.  The edges of the donor site were undermined.   The donor site was closed in a primary fashion.  The pedicle was then rotated into position and sutured.  Once the tube was sutured into place, adequate blood supply was confirmed with blanching and refill.  The pedicle was then wrapped with xeroform gauze and dressed appropriately with a telfa and gauze bandage to ensure continued blood supply and protect the attached pedicle. Paramedian Forehead Flap Text: A decision was made to reconstruct the defect utilizing an interpolation axial flap and a staged reconstruction.  A telfa template was made of the defect.  This telfa template was then used to outline the paramedian forehead pedicle flap.  The donor area for the pedicle flap was then injected with anesthesia.  The flap was excised through the skin and subcutaneous tissue down to the layer of the underlying musculature.  The pedicle flap was carefully excised within this deep plane to maintain its blood supply.  The edges of the donor site were undermined.   The donor site was closed in a primary fashion.  The pedicle was then rotated into position and sutured.  Once the tube was sutured into place, adequate blood supply was confirmed with blanching and refill.  The pedicle was then wrapped with xeroform gauze and dressed appropriately with a telfa and gauze bandage to ensure continued blood supply and protect the attached pedicle. Abbe Flap (Upper To Lower Lip) Text: The defect of the lower lip was assessed and measured.  Given the location and size of the defect, an Abbe flap was deemed most appropriate.  Using a sterile surgical marker, an appropriate Abbe flap was measured and drawn on the upper lip. Local anesthesia was then infiltrated.  A scalpel was then used to incise the upper lip through and through the skin, vermilion, muscle and mucosa, leaving the flap pedicled on the opposite side.  The flap was then rotated and transferred to the lower lip defect.  The flap was then sutured into place with a three layer technique, closing the orbicularis oris muscle layer with subcutaneous buried sutures, followed by a mucosal layer and an epidermal layer. Abbe Flap (Lower To Upper Lip) Text: The defect of the upper lip was assessed and measured.  Given the location and size of the defect, an Abbe flap was deemed most appropriate.  Using a sterile surgical marker, an appropriate Abbe flap was measured and drawn on the lower lip. Local anesthesia was then infiltrated. A scalpel was then used to incise the upper lip through and through the skin, vermilion, muscle and mucosa, leaving the flap pedicled on the opposite side.  The flap was then rotated and transferred to the lower lip defect.  The flap was then sutured into place with a three layer technique, closing the orbicularis oris muscle layer with subcutaneous buried sutures, followed by a mucosal layer and an epidermal layer. Estlander Flap (Upper To Lower Lip) Text: The defect of the lower lip was assessed and measured.  Given the location and size of the defect, an Estlander flap was deemed most appropriate.  Using a sterile surgical marker, an appropriate Estlander flap was measured and drawn on the upper lip. Local anesthesia was then infiltrated. A scalpel was then used to incise the lateral aspect of the flap, through skin, muscle and mucosa, leaving the flap pedicled medially.  The flap was then rotated and positioned to fill the lower lip defect.  The flap was then sutured into place with a three layer technique, closing the orbicularis oris muscle layer with subcutaneous buried sutures, followed by a mucosal layer and an epidermal layer. Cheiloplasty (Less Than 50%) Text: A decision was made to reconstruct the defect with a  cheiloplasty.  The defect was undermined extensively.  Additional obicularis oris muscle was excised with a 15 blade scalpel.  The defect was converted into a full thickness wedge, of less than 50% of the vertical height of the lip, to facilite a better cosmetic result.  Small vessels were then tied off with 5-0 monocyrl. The obicularis oris, superficial fascia, adipose and dermis were then reapproximated.  After the deeper layers were approximated the epidermis was reapproximated with particular care given to realign the vermilion border. Cheiloplasty (Complex) Text: A decision was made to reconstruct the defect with a  cheiloplasty.  The defect was undermined extensively.  Additional obicularis oris muscle was excised with a 15 blade scalpel.  The defect was converted into a full thickness wedge to facilite a better cosmetic result.  Small vessels were then tied off with 5-0 monocyrl. The obicularis oris, superficial fascia, adipose and dermis were then reapproximated.  After the deeper layers were approximated the epidermis was reapproximated with particular care given to realign the vermilion border. Ear Wedge Repair Text: A wedge excision was completed by carrying down an excision through the full thickness of the ear and cartilage with an inward facing Burow's triangle. The wound was then closed in a layered fashion. Full Thickness Lip Wedge Repair (Flap) Text: Given the location of the defect and the proximity to free margins a full thickness wedge repair was deemed most appropriate.  Using a sterile surgical marker, the appropriate repair was drawn incorporating the defect and placing the expected incisions perpendicular to the vermilion border.  The vermilion border was also meticulously outlined to ensure appropriate reapproximation during the repair.  The area thus outlined was incised through and through with a #15 scalpel blade.  The muscularis and dermis were reaproximated with deep sutures following hemostasis. Care was taken to realign the vermilion border before proceeding with the superficial closure.  Once the vermilion was realigned the superfical and mucosal closure was finished. Ftsg Text: The defect edges were debeveled with a #15 scalpel blade.  Given the location of the defect, shape of the defect and the proximity to free margins a full thickness skin graft was deemed most appropriate.  Using a sterile surgical marker, the primary defect shape was transferred to the donor site. The area thus outlined was incised deep to adipose tissue with a #15 scalpel blade.  The harvested graft was then trimmed of adipose tissue until only dermis and epidermis was left.  The skin margins of the secondary defect were undermined to an appropriate distance in all directions utilizing iris scissors.  The secondary defect was closed with interrupted buried subcutaneous sutures.  The skin edges were then re-apposed with running  sutures.  The skin graft was then placed in the primary defect and oriented appropriately. Split-Thickness Skin Graft Text: The defect edges were debeveled with a #15 scalpel blade.  Given the location of the defect, shape of the defect and the proximity to free margins a split thickness skin graft was deemed most appropriate.  Using a sterile surgical marker, the primary defect shape was transferred to the donor site. The split thickness graft was then harvested.  The skin graft was then placed in the primary defect and oriented appropriately. Pinch Graft Text: The defect edges were debeveled with a #15 scalpel blade. Given the location of the defect, shape of the defect and the proximity to free margins a pinch graft was deemed most appropriate. Using a sterile surgical marker, the primary defect shape was transferred to the donor site. The area thus outlined was incised deep to adipose tissue with a #15 scalpel blade.  The harvested graft was then trimmed of adipose tissue until only dermis and epidermis was left. The skin margins of the secondary defect were undermined to an appropriate distance in all directions utilizing iris scissors.  The secondary defect was closed with interrupted buried subcutaneous sutures.  The skin edges were then re-apposed with running  sutures.  The skin graft was then placed in the primary defect and oriented appropriately. Burow's Graft Text: The defect edges were debeveled with a #15 scalpel blade.  Given the location of the defect, shape of the defect, the proximity to free margins and the presence of a standing cone deformity a Burow's skin graft was deemed most appropriate. The standing cone was removed and this tissue was then trimmed to the shape of the primary defect. The adipose tissue was also removed until only dermis and epidermis were left.  The skin margins of the secondary defect were undermined to an appropriate distance in all directions utilizing iris scissors.  The secondary defect was closed with interrupted buried subcutaneous sutures.  The skin edges were then re-apposed with running  sutures.  The skin graft was then placed in the primary defect and oriented appropriately. Cartilage Graft Text: The defect edges were debeveled with a #15 scalpel blade.  Given the location of the defect, shape of the defect, the fact the defect involved a full thickness cartilage defect a cartilage graft was deemed most appropriate.  An appropriate donor site was identified, cleansed, and anesthetized. The cartilage graft was then harvested and transferred to the recipient site, oriented appropriately and then sutured into place.  The secondary defect was then repaired using a primary closure. Composite Graft Text: The defect edges were debeveled with a #15 scalpel blade.  Given the location of the defect, shape of the defect, the proximity to free margins and the fact the defect was full thickness a composite graft was deemed most appropriate.  The defect was outline and then transferred to the donor site.  A full thickness graft was then excised from the donor site. The graft was then placed in the primary defect, oriented appropriately and then sutured into place.  The secondary defect was then repaired using a primary closure. Epidermal Autograft Text: The defect edges were debeveled with a #15 scalpel blade.  Given the location of the defect, shape of the defect and the proximity to free margins an epidermal autograft was deemed most appropriate.  Using a sterile surgical marker, the primary defect shape was transferred to the donor site. The epidermal graft was then harvested.  The skin graft was then placed in the primary defect and oriented appropriately. Dermal Autograft Text: The defect edges were debeveled with a #15 scalpel blade.  Given the location of the defect, shape of the defect and the proximity to free margins a dermal autograft was deemed most appropriate.  Using a sterile surgical marker, the primary defect shape was transferred to the donor site. The area thus outlined was incised deep to adipose tissue with a #15 scalpel blade.  The harvested graft was then trimmed of adipose and epidermal tissue until only dermis was left.  The skin graft was then placed in the primary defect and oriented appropriately. Skin Substitute Text: The defect edges were debeveled with a #15 scalpel blade.  Given the location of the defect, shape of the defect and the proximity to free margins a skin substitute graft was deemed most appropriate.  The graft material was trimmed to fit the size of the defect. The graft was then placed in the primary defect and oriented appropriately. Tissue Cultured Epidermal Autograft Text: The defect edges were debeveled with a #15 scalpel blade.  Given the location of the defect, shape of the defect and the proximity to free margins a tissue cultured epidermal autograft was deemed most appropriate.  The graft was then trimmed to fit the size of the defect.  The graft was then placed in the primary defect and oriented appropriately. Xenograft Text: The defect edges were debeveled with a #15 scalpel blade.  Given the location of the defect, shape of the defect and the proximity to free margins a xenograft was deemed most appropriate.  The graft was then trimmed to fit the size of the defect.  The graft was then placed in the primary defect and oriented appropriately. Purse String (Simple) Text: Given the location of the defect and the characteristics of the surrounding skin a purse string closure was deemed most appropriate.  Undermining was performed circumfirentially around the surgical defect.  A purse string suture was then placed and tightened. Purse String (Intermediate) Text: Given the location of the defect and the characteristics of the surrounding skin a purse string intermediate closure was deemed most appropriate.  Undermining was performed circumfirentially around the surgical defect.  A purse string suture was then placed and tightened. Partial Purse String (Simple) Text: Given the location of the defect and the characteristics of the surrounding skin a simple purse string closure was deemed most appropriate.  Undermining was performed circumfirentially around the surgical defect.  A purse string suture was then placed and tightened. Wound tension only allowed a partial closure of the circular defect. Partial Purse String (Intermediate) Text: Given the location of the defect and the characteristics of the surrounding skin an intermediate purse string closure was deemed most appropriate.  Undermining was performed circumfirentially around the surgical defect.  A purse string suture was then placed and tightened. Wound tension only allowed a partial closure of the circular defect. Localized Dermabrasion With Wire Brush Text: The patient was draped in routine manner.  Localized dermabrasion using 3 x 17 mm wire brush was performed in routine manner to papillary dermis. This spot dermabrasion is being performed to complete skin cancer reconstruction. It also will eliminate the other sun damaged precancerous cells that are known to be part of the regional effect of a lifetime's worth of sun exposure. This localized dermabrasion is therapeutic and should not be considered cosmetic in any regard. Localized Dermabrasion With Sand Papertext: The patient was draped in routine manner.  Localized dermabrasion using sterile sand paper was performed in routine manner to papillary dermis. This spot dermabrasion is being performed to complete skin cancer reconstruction. It also will eliminate the other sun damaged precancerous cells that are known to be part of the regional effect of a lifetime's worth of sun exposure. This localized dermabrasion is therapeutic and should not be considered cosmetic in any regard. Localized Dermabrasion With 15 Blade Text: The patient was draped in routine manner.  Localized dermabrasion using a 15 blade was performed in routine manner to papillary dermis. This spot dermabrasion is being performed to complete skin cancer reconstruction. It also will eliminate the other sun damaged precancerous cells that are known to be part of the regional effect of a lifetime's worth of sun exposure. This localized dermabrasion is therapeutic and should not be considered cosmetic in any regard. Tarsorrhaphy Text: A tarsorrhaphy was performed using Frost sutures. Intermediate Repair And Flap Additional Text (Will Appearing After The Standard Complex Repair Text): The intermediate repair was not sufficient to completely close the primary defect. The remaining additional defect was repaired with the flap mentioned below. Intermediate Repair And Graft Additional Text (Will Appearing After The Standard Complex Repair Text): The intermediate repair was not sufficient to completely close the primary defect. The remaining additional defect was repaired with the graft mentioned below. Complex Repair And Flap Additional Text (Will Appearing After The Standard Complex Repair Text): The complex repair was not sufficient to completely close the primary defect. The remaining additional defect was repaired with the flap mentioned below. Complex Repair And Graft Additional Text (Will Appearing After The Standard Complex Repair Text): The complex repair was not sufficient to completely close the primary defect. The remaining additional defect was repaired with the graft mentioned below. Eyelid Full Thickness Repair - 40247: The eyelid defect was full thickness which required a wedge repair of the eyelid. Special care was taken to ensure that the eyelid margin was realligned when placing sutures. Eyelid Partial Thickness Repair - 12019: The eyelid defect was partial thickness which required a wedge repair of the eyelid. Special care was taken to ensure that the eyelid margin was realligned when placing sutures. Manual Repair Warning Statement: We plan on removing the manually selected variable below in favor of our much easier automatic structured text blocks found in the previous tab. We decided to do this to help make the flow better and give you the full power of structured data. Manual selection is never going to be ideal in our platform and I would encourage you to avoid using manual selection from this point on, especially since I will be sunsetting this feature. It is important that you do one of two things with the customized text below. First, you can save all of the text in a word file so you can have it for future reference. Second, transfer the text to the appropriate area in the Library tab. Lastly, if there is a flap or graft type which we do not have you need to let us know right away so I can add it in before the variable is hidden. No need to panic, we plan to give you roughly 6 months to make the change. Same Histology In Subsequent Stages Text: The pattern and morphology of the tumor is as described in the first stage. No Residual Tumor Seen Histology Text: There were no malignant cells seen in the sections examined. Inflammation Suggestive Of Cancer Camouflage Histology Text: There was a dense lymphocytic infiltrate which prevented adequate histologic evaluation of adjacent structures. Bcc Histology Text: There were numerous aggregates of basaloid cells. Bcc Infiltrative Histology Text: There were numerous aggregates of basaloid cells demonstrating an infiltrative pattern. Mart-1 - Positive Histology Text: MART-1 staining demonstrates areas of higher density and clustering of melanocytes with Pagetoid spread upwards within the epidermis. The surgical margins are positive for tumor cells. Mart-1 - Negative Histology Text: MART-1 staining demonstrates a normal density and pattern of melanocytes along the dermal-epidermal junction. The surgical margins are negative for tumor cells. Information: Selecting Yes will display possible errors in your note based on the variables you have selected. This validation is only offered as a suggestion for you. PLEASE NOTE THAT THE VALIDATION TEXT WILL BE REMOVED WHEN YOU FINALIZE YOUR NOTE. IF YOU WANT TO FAX A PRELIMINARY NOTE YOU WILL NEED TO TOGGLE THIS TO 'NO' IF YOU DO NOT WANT IT IN YOUR FAXED NOTE. Bill 59 Modifier?: No - Continue to Bill 79 Modifier Mohs Case Number: [id number] Date Of Previous Biopsy (Optional): 04/01/2024 Previous Accession (Optional): [id number] Initial Size Of Lesion: 1.1 X Size Of Lesion In Cm (Optional): 0.7 Primary Defect Length In Cm (Final Defect Size - Required For Flaps/Grafts): 1.4 Primary Defect Width In Cm (Final Defect Size - Required For Flaps/Grafts): 1.2 Repair Type: No repair - secondary intention Tumor Debulked?: dermablade Depth Of Tumor Invasion (For Histology): dermis Deep Sutures: 5-0 Vicryl Epidermal Sutures: 6-0 Ethilon Epidermal Closure: simple interrupted

## 2025-06-05 NOTE — DIETITIAN INITIAL EVALUATION ADULT - PERTINENT LABORATORY DATA
06-04    138  |  99  |  68[HH]  ----------------------------<  77  4.5   |  21  |  3.8[H]    Ca    8.2[L]      04 Jun 2025 16:13    TPro  7.0  /  Alb  3.2[L]  /  TBili  3.4[H]  /  DBili  x   /  AST  192[H]  /  ALT  167[H]  /  AlkPhos  109  06-04   06-04    138  |  99  |  68[HH]  ----------------------------<  77  4.5   |  21  |  3.8[H]    Ca    8.2[L]      04 Jun 2025 16:13    TPro  7.0  /  Alb  3.2[L]  /  TBili  3.4[H]  /  DBili  x   /  AST  192[H]  /  ALT  167[H]  /  AlkPhos  109  06-04                            7.9    7.49  )-----------( 196      ( 04 Jun 2025 07:16 )             25.3

## 2025-06-05 NOTE — DIETITIAN INITIAL EVALUATION ADULT - NS FNS DIET ORDER
Diet, Regular:   DASH/TLC {Sodium & Cholesterol Restricted} (DASH)  Easy to Chew (EASYTOCHEW) (06-04-25 @ 00:57) [Active]

## 2025-06-05 NOTE — DIETITIAN INITIAL EVALUATION ADULT - OTHER INFO
pt is 84 year old male with hx of TBI, speech impairment, ex-smoker quit 50 years ago, HTN, HLD, GERD, anemia, splenectomy, skin CA s/p excision and radiation, Hep C (treated), HFrEF, MR/TR/AS, pulmonary htn, orthostatic hypotension, thoracic and lumbar  compression fracture, and CVA  presented to the ED complaining of 3 weeks of leg swelling. pt admitted with pulmonary edema, HFpEF, renal failure. pt to be discharged home with hospice.

## 2025-06-06 RX ADMIN — MIRTAZAPINE 15 MILLIGRAM(S): 30 TABLET, FILM COATED ORAL at 21:05

## 2025-06-06 RX ADMIN — Medication 1 APPLICATION(S): at 06:12

## 2025-06-06 RX ADMIN — IPRATROPIUM BROMIDE AND ALBUTEROL SULFATE 3 MILLILITER(S): .5; 2.5 SOLUTION RESPIRATORY (INHALATION) at 19:38

## 2025-06-06 RX ADMIN — ATORVASTATIN CALCIUM 80 MILLIGRAM(S): 80 TABLET, FILM COATED ORAL at 21:06

## 2025-06-06 RX ADMIN — IPRATROPIUM BROMIDE AND ALBUTEROL SULFATE 3 MILLILITER(S): .5; 2.5 SOLUTION RESPIRATORY (INHALATION) at 08:56

## 2025-06-06 RX ADMIN — FUROSEMIDE 40 MILLIGRAM(S): 10 INJECTION INTRAMUSCULAR; INTRAVENOUS at 09:56

## 2025-06-06 RX ADMIN — Medication 0.1 MILLIGRAM(S): at 06:06

## 2025-06-06 RX ADMIN — Medication 81 MILLIGRAM(S): at 11:23

## 2025-06-06 RX ADMIN — TAMSULOSIN HYDROCHLORIDE 0.4 MILLIGRAM(S): 0.4 CAPSULE ORAL at 21:05

## 2025-06-06 RX ADMIN — HEPARIN SODIUM 5000 UNIT(S): 1000 INJECTION INTRAVENOUS; SUBCUTANEOUS at 13:29

## 2025-06-06 RX ADMIN — IPRATROPIUM BROMIDE AND ALBUTEROL SULFATE 3 MILLILITER(S): .5; 2.5 SOLUTION RESPIRATORY (INHALATION) at 14:15

## 2025-06-06 RX ADMIN — Medication 40 MILLIGRAM(S): at 06:07

## 2025-06-06 RX ADMIN — HEPARIN SODIUM 5000 UNIT(S): 1000 INJECTION INTRAVENOUS; SUBCUTANEOUS at 06:09

## 2025-06-06 RX ADMIN — IPRATROPIUM BROMIDE AND ALBUTEROL SULFATE 3 MILLILITER(S): .5; 2.5 SOLUTION RESPIRATORY (INHALATION) at 00:30

## 2025-06-06 RX ADMIN — CLOPIDOGREL BISULFATE 75 MILLIGRAM(S): 75 TABLET, FILM COATED ORAL at 11:23

## 2025-06-06 RX ADMIN — HEPARIN SODIUM 5000 UNIT(S): 1000 INJECTION INTRAVENOUS; SUBCUTANEOUS at 21:06

## 2025-06-06 RX ADMIN — ESCITALOPRAM OXALATE 20 MILLIGRAM(S): 20 TABLET ORAL at 11:23

## 2025-06-06 RX ADMIN — Medication 25 MILLIGRAM(S): at 06:06

## 2025-06-07 RX ADMIN — IPRATROPIUM BROMIDE AND ALBUTEROL SULFATE 3 MILLILITER(S): .5; 2.5 SOLUTION RESPIRATORY (INHALATION) at 14:08

## 2025-06-07 RX ADMIN — HEPARIN SODIUM 5000 UNIT(S): 1000 INJECTION INTRAVENOUS; SUBCUTANEOUS at 15:22

## 2025-06-07 RX ADMIN — HEPARIN SODIUM 5000 UNIT(S): 1000 INJECTION INTRAVENOUS; SUBCUTANEOUS at 21:15

## 2025-06-07 RX ADMIN — MIRTAZAPINE 15 MILLIGRAM(S): 30 TABLET, FILM COATED ORAL at 21:15

## 2025-06-07 RX ADMIN — IPRATROPIUM BROMIDE AND ALBUTEROL SULFATE 3 MILLILITER(S): .5; 2.5 SOLUTION RESPIRATORY (INHALATION) at 07:47

## 2025-06-07 RX ADMIN — Medication 40 MILLIGRAM(S): at 05:47

## 2025-06-07 RX ADMIN — IPRATROPIUM BROMIDE AND ALBUTEROL SULFATE 3 MILLILITER(S): .5; 2.5 SOLUTION RESPIRATORY (INHALATION) at 19:53

## 2025-06-07 RX ADMIN — Medication 1 APPLICATION(S): at 05:44

## 2025-06-07 RX ADMIN — FUROSEMIDE 40 MILLIGRAM(S): 10 INJECTION INTRAMUSCULAR; INTRAVENOUS at 05:43

## 2025-06-07 RX ADMIN — Medication 81 MILLIGRAM(S): at 11:36

## 2025-06-07 RX ADMIN — CLOPIDOGREL BISULFATE 75 MILLIGRAM(S): 75 TABLET, FILM COATED ORAL at 11:36

## 2025-06-07 RX ADMIN — Medication 25 MILLIGRAM(S): at 05:44

## 2025-06-07 RX ADMIN — TAMSULOSIN HYDROCHLORIDE 0.4 MILLIGRAM(S): 0.4 CAPSULE ORAL at 21:15

## 2025-06-07 RX ADMIN — ESCITALOPRAM OXALATE 20 MILLIGRAM(S): 20 TABLET ORAL at 11:36

## 2025-06-07 RX ADMIN — HEPARIN SODIUM 5000 UNIT(S): 1000 INJECTION INTRAVENOUS; SUBCUTANEOUS at 05:43

## 2025-06-07 RX ADMIN — Medication 0.1 MILLIGRAM(S): at 05:54

## 2025-06-07 RX ADMIN — ATORVASTATIN CALCIUM 80 MILLIGRAM(S): 80 TABLET, FILM COATED ORAL at 21:15

## 2025-06-07 NOTE — PROGRESS NOTE ADULT - PROVIDER SPECIALTY LIST ADULT
Cardiology Preventive
Palliative Care

## 2025-06-07 NOTE — PROGRESS NOTE ADULT - SUBJECTIVE AND OBJECTIVE BOX
Name: ÁLVARO SELLERS  Age: 84y  Gender: Male    Pt was seen and examined.   c/o:  doing same, no acute changes    Allergies:  No Known Allergies      PHYSICAL EXAM:    Vitals:  T(C): 36.7 (06-07-25 @ 21:03), Max: 36.7 (06-07-25 @ 14:03)  HR: 64 (06-07-25 @ 21:03) (64 - 73)  BP: 109/71 (06-07-25 @ 21:03) (102/61 - 111/71)  RR: 18 (06-07-25 @ 21:03) (18 - 18)  SpO2: 98% (06-07-25 @ 21:03) (91% - 98%)  Wt(kg): --Vital Signs Last 24 Hrs  T(C): 36.7 (07 Jun 2025 21:03), Max: 36.7 (07 Jun 2025 14:03)  T(F): 98 (07 Jun 2025 21:03), Max: 98.1 (07 Jun 2025 14:03)  HR: 64 (07 Jun 2025 21:03) (64 - 73)  BP: 109/71 (07 Jun 2025 21:03) (102/61 - 111/71)  BP(mean): --  RR: 18 (07 Jun 2025 21:03) (18 - 18)  SpO2: 98% (07 Jun 2025 21:03) (91% - 98%)    Parameters below as of 07 Jun 2025 04:45  Patient On (Oxygen Delivery Method): nasal cannula  O2 Flow (L/min): 4        NECK: Supple, No JVD  CHEST/LUNG: CTA, B/L, No rales, rhonchi, wheezing, or rubs  HEART: S1,S2, N1 Regular rate and rhythm; No murmurs, rubs, or gallops  ABDOMEN: Soft, Nontender, Nondistended; Bowel sounds present  EXTREMITIES:  2+ Peripheral Pulses, No clubbing, cyanosis, or edema      LABS:                  MEDICATIONS  (STANDING):  albuterol/ipratropium for Nebulization 3 milliLiter(s) Nebulizer every 6 hours  aspirin enteric coated 81 milliGRAM(s) Oral daily  atorvastatin 80 milliGRAM(s) Oral at bedtime  chlorhexidine 2% Cloths 1 Application(s) Topical <User Schedule>  clopidogrel Tablet 75 milliGRAM(s) Oral daily  escitalopram 20 milliGRAM(s) Oral daily  fludroCORTISONE 0.1 milliGRAM(s) Oral daily  furosemide   Injectable 40 milliGRAM(s) IV Push daily  heparin   Injectable 5000 Unit(s) SubCutaneous every 8 hours  mirtazapine 15 milliGRAM(s) Oral at bedtime  pantoprazole    Tablet 40 milliGRAM(s) Oral before breakfast  spironolactone 25 milliGRAM(s) Oral daily  tamsulosin 0.4 milliGRAM(s) Oral at bedtime        RADIOLOGY & ADDITIONAL TESTS:    Imaging Personally Reviewed:  [ ] YES  [ ] NO    A/P:  83 yo M with a pmhx of TBI, speech impairment, ex-smoker quit 50 years ago, HTN, HLD, GERD, anemia, splenectomy, skin CA s/p excision and radiation, Hep C (treated), HFrEF, MR/TR/AS, pulmonary htn, orthostatic hypotension, thoracic and lumbar  compression fracture, and CVA  presented to the ED complaining of 3 weeks of leg swelling,    # Pulmonary edema/chronic, no acute chf  #Acute on chronic renal failure,   # HFrEF  - troponin elevated - will not follow  hospice consult appreciated     # Anemia  will not follow    PLAN - hospice care
  Name: ÁLVARO SELLERS  Age: 84y  Gender: Male    Pt was seen and examined.   c/o:  about same     Allergies:  No Known Allergies      PHYSICAL EXAM:    Vitals:  T(C): 36.4 (06-06-25 @ 20:05), Max: 36.6 (06-06-25 @ 13:37)  HR: 68 (06-06-25 @ 20:05) (68 - 89)  BP: 115/69 (06-06-25 @ 20:05) (95/62 - 127/66)  RR: 18 (06-06-25 @ 20:05) (18 - 18)  SpO2: 94% (06-06-25 @ 20:05) (90% - 100%)  Wt(kg): --Vital Signs Last 24 Hrs  T(C): 36.4 (06 Jun 2025 20:05), Max: 36.6 (06 Jun 2025 13:37)  T(F): 97.5 (06 Jun 2025 20:05), Max: 97.8 (06 Jun 2025 13:37)  HR: 68 (06 Jun 2025 20:05) (68 - 89)  BP: 115/69 (06 Jun 2025 20:05) (95/62 - 127/66)  BP(mean): --  RR: 18 (06 Jun 2025 20:05) (18 - 18)  SpO2: 94% (06 Jun 2025 20:05) (90% - 100%)    Parameters below as of 06 Jun 2025 20:05  Patient On (Oxygen Delivery Method): nasal cannula  O2 Flow (L/min): 4        NECK: Supple, No JVD  CHEST/LUNG: CTA, B/L, No rales, rhonchi, wheezing, or rubs  HEART: S1,S2, N1 Regular rate and rhythm; No murmurs, rubs, or gallops  ABDOMEN: Soft, Nontender, Nondistended; Bowel sounds present  EXTREMITIES:  2+ Peripheral Pulses, No clubbing, cyanosis, or edema      LABS:                  MEDICATIONS  (STANDING):  albuterol/ipratropium for Nebulization 3 milliLiter(s) Nebulizer every 6 hours  aspirin enteric coated 81 milliGRAM(s) Oral daily  atorvastatin 80 milliGRAM(s) Oral at bedtime  chlorhexidine 2% Cloths 1 Application(s) Topical <User Schedule>  clopidogrel Tablet 75 milliGRAM(s) Oral daily  escitalopram 20 milliGRAM(s) Oral daily  fludroCORTISONE 0.1 milliGRAM(s) Oral daily  furosemide   Injectable 40 milliGRAM(s) IV Push daily  heparin   Injectable 5000 Unit(s) SubCutaneous every 8 hours  mirtazapine 15 milliGRAM(s) Oral at bedtime  pantoprazole    Tablet 40 milliGRAM(s) Oral before breakfast  spironolactone 25 milliGRAM(s) Oral daily  tamsulosin 0.4 milliGRAM(s) Oral at bedtime        RADIOLOGY & ADDITIONAL TESTS:    Imaging Personally Reviewed:  [ ] YES  [ ] NO    A/P:  85 yo M with a pmhx of TBI, speech impairment, ex-smoker quit 50 years ago, HTN, HLD, GERD, anemia, splenectomy, skin CA s/p excision and radiation, Hep C (treated), HFrEF, MR/TR/AS, pulmonary htn, orthostatic hypotension, thoracic and lumbar  compression fracture, and CVA  presented to the ED complaining of 3 weeks of leg swelling,    # Pulmonary edema/chronic, no acute chf  #Acute on chronic renal failure,   # HFrEF  - troponin elevated - will not follow  hospice consult appreciated     # Anemia  will not follow    I know the pt well and I strongly believe that the best for pt and family at this time is hospice    
  Name: ÁLVARO SELLERS  Age: 84y  Gender: Male    Pt was seen and examined.   c/o:  doing same     Allergies:  No Known Allergies      PHYSICAL EXAM:    Vitals:  T(C): 36.4 (06-05-25 @ 21:18), Max: 36.7 (06-05-25 @ 13:37)  HR: 92 (06-05-25 @ 21:18) (85 - 98)  BP: 118/74 (06-05-25 @ 21:18) (118/74 - 119/64)  RR: 18 (06-05-25 @ 21:18) (18 - 18)  SpO2: 99% (06-05-25 @ 21:18) (97% - 99%)  Wt(kg): --Vital Signs Last 24 Hrs  T(C): 36.4 (05 Jun 2025 21:18), Max: 36.7 (05 Jun 2025 13:37)  T(F): 97.5 (05 Jun 2025 21:18), Max: 98.1 (05 Jun 2025 13:37)  HR: 92 (05 Jun 2025 21:18) (85 - 98)  BP: 118/74 (05 Jun 2025 21:18) (118/74 - 119/64)  BP(mean): --  RR: 18 (05 Jun 2025 21:18) (18 - 18)  SpO2: 99% (05 Jun 2025 21:18) (97% - 99%)    Parameters below as of 05 Jun 2025 21:18  Patient On (Oxygen Delivery Method): nasal cannula  O2 Flow (L/min): 4        NECK: Supple, No JVD  CHEST/LUNG: CTA, B/L, No rales, rhonchi, wheezing, or rubs  HEART: S1,S2, N1 Regular rate and rhythm; No murmurs, rubs, or gallops  ABDOMEN: Soft, Nontender, Nondistended; Bowel sounds present  EXTREMITIES:  2+ Peripheral Pulses, No clubbing, cyanosis, or edema      LABS:                        7.9    7.49  )-----------( 196      ( 04 Jun 2025 07:16 )             25.3     06-04    138  |  99  |  68[HH]  ----------------------------<  77  4.5   |  21  |  3.8[H]    Ca    8.2[L]      04 Jun 2025 16:13    TPro  7.0  /  Alb  3.2[L]  /  TBili  3.4[H]  /  DBili  x   /  AST  192[H]  /  ALT  167[H]  /  AlkPhos  109  06-04    LIVER FUNCTIONS - ( 04 Jun 2025 07:16 )  Alb: 3.2 g/dL / Pro: 7.0 g/dL / ALK PHOS: 109 U/L / ALT: 167 U/L / AST: 192 U/L / GGT: x                 MEDICATIONS  (STANDING):  albuterol/ipratropium for Nebulization 3 milliLiter(s) Nebulizer every 6 hours  aspirin enteric coated 81 milliGRAM(s) Oral daily  atorvastatin 80 milliGRAM(s) Oral at bedtime  chlorhexidine 2% Cloths 1 Application(s) Topical <User Schedule>  clopidogrel Tablet 75 milliGRAM(s) Oral daily  escitalopram 20 milliGRAM(s) Oral daily  fludroCORTISONE 0.1 milliGRAM(s) Oral daily  furosemide   Injectable 40 milliGRAM(s) IV Push daily  heparin   Injectable 5000 Unit(s) SubCutaneous every 8 hours  mirtazapine 15 milliGRAM(s) Oral at bedtime  pantoprazole    Tablet 40 milliGRAM(s) Oral before breakfast  spironolactone 25 milliGRAM(s) Oral daily  tamsulosin 0.4 milliGRAM(s) Oral at bedtime        RADIOLOGY & ADDITIONAL TESTS:    Imaging Personally Reviewed:  [ ] YES  [ ] NO    A/P:  83 yo M with a pmhx of TBI, speech impairment, ex-smoker quit 50 years ago, HTN, HLD, GERD, anemia, splenectomy, skin CA s/p excision and radiation, Hep C (treated), HFrEF, MR/TR/AS, pulmonary htn, orthostatic hypotension, thoracic and lumbar  compression fracture, and CVA  presented to the ED complaining of 3 weeks of leg swelling,    # Pulmonary edema/chronic, no acute chf  #Acute on chronic renal failure,   # HFrEF  - troponin elevated - will not follow  hospice consult appreciated     # Anemia  will not follow    I know the pt well and I strongly believe that the best for pt and family at this time is hospice    
  Name: ÁLVARO SELLERS  Age: 84y  Gender: Male    Pt was seen and examined.   c/o: same   no acute changes    Allergies:  No Known Allergies      PHYSICAL EXAM:    Vitals:  T(C): 36.4 (06-04-25 @ 21:05), Max: 36.4 (06-04-25 @ 21:05)  HR: 96 (06-04-25 @ 21:05) (87 - 97)  BP: 114/76 (06-04-25 @ 21:05) (112/82 - 128/57)  RR: 18 (06-04-25 @ 21:05) (18 - 18)  SpO2: 97% (06-04-25 @ 21:05) (95% - 100%)  Wt(kg): --Vital Signs Last 24 Hrs  T(C): 36.4 (04 Jun 2025 21:05), Max: 36.4 (04 Jun 2025 21:05)  T(F): 97.6 (04 Jun 2025 21:05), Max: 97.6 (04 Jun 2025 21:05)  HR: 96 (04 Jun 2025 21:05) (87 - 97)  BP: 114/76 (04 Jun 2025 21:05) (112/82 - 128/57)  BP(mean): --  RR: 18 (04 Jun 2025 21:05) (18 - 18)  SpO2: 97% (04 Jun 2025 21:05) (95% - 100%)    Parameters below as of 04 Jun 2025 21:05  Patient On (Oxygen Delivery Method): nasal cannula  O2 Flow (L/min): 4        NECK: Supple, No JVD  CHEST/LUNG: CTA, B/L, No rales, rhonchi, wheezing, or rubs  HEART: S1,S2, N1 Regular rate and rhythm; No murmurs, rubs, or gallops  ABDOMEN: Soft, Nontender, Nondistended; Bowel sounds present  EXTREMITIES:  2+ Peripheral Pulses, No clubbing, cyanosis, or edema      LABS:                        7.9    7.49  )-----------( 196      ( 04 Jun 2025 07:16 )             25.3     06-04    138  |  99  |  68[HH]  ----------------------------<  77  4.5   |  21  |  3.8[H]    Ca    8.2[L]      04 Jun 2025 16:13    TPro  7.0  /  Alb  3.2[L]  /  TBili  3.4[H]  /  DBili  x   /  AST  192[H]  /  ALT  167[H]  /  AlkPhos  109  06-04    LIVER FUNCTIONS - ( 04 Jun 2025 07:16 )  Alb: 3.2 g/dL / Pro: 7.0 g/dL / ALK PHOS: 109 U/L / ALT: 167 U/L / AST: 192 U/L / GGT: x                 MEDICATIONS  (STANDING):  albuterol/ipratropium for Nebulization 3 milliLiter(s) Nebulizer every 6 hours  aspirin enteric coated 81 milliGRAM(s) Oral daily  atorvastatin 80 milliGRAM(s) Oral at bedtime  chlorhexidine 2% Cloths 1 Application(s) Topical <User Schedule>  clopidogrel Tablet 75 milliGRAM(s) Oral daily  escitalopram 20 milliGRAM(s) Oral daily  fludroCORTISONE 0.1 milliGRAM(s) Oral daily  furosemide   Injectable 40 milliGRAM(s) IV Push daily  heparin   Injectable 5000 Unit(s) SubCutaneous every 8 hours  mirtazapine 15 milliGRAM(s) Oral at bedtime  pantoprazole    Tablet 40 milliGRAM(s) Oral before breakfast  spironolactone 25 milliGRAM(s) Oral daily  tamsulosin 0.4 milliGRAM(s) Oral at bedtime        RADIOLOGY & ADDITIONAL TESTS:    Imaging Personally Reviewed:  [ ] YES  [ ] NO    A/P:  83 yo M with a pmhx of TBI, speech impairment, ex-smoker quit 50 years ago, HTN, HLD, GERD, anemia, splenectomy, skin CA s/p excision and radiation, Hep C (treated), HFrEF, MR/TR/AS, pulmonary htn, orthostatic hypotension, thoracic and lumbar  compression fracture, and CVA  presented to the ED complaining of 3 weeks of leg swelling,    # Pulmonary edema/chronic, no acute chf  #Acute on chronic renal failure,   # HFrEF  - troponin elevated - will not follow  hospice consult appreciated     # Anemia  will not follow    I know the pt well and I strongly believe that the best for pt and family at this time is hospice  
HPI:  85 yo M with a pmhx of TBI, speech impairment, ex-smoker quit 50 years ago, HTN, HLD, GERD, anemia, splenectomy, skin CA s/p excision and radiation, Hep C (treated), HFrEF, MR/TR/AS, pulmonary htn, orthostatic hypotension, thoracic and lumbar  compression fracture, and CVA  presented to the ED complaining of 3 weeks of leg swelling, Denies trauma to legs. States he has been having difficulty walking, admits to some cough, denies bleeding, CP, SOB at rest, abd pain, n/v, HA.   Wife  having difficulty  taking  care of him at home.     Interval history  -Patient seen at bedside  -He was lethargic and did not participate fully in exam     ADVANCE DIRECTIVES:     [ ] Full Code [ x] DNR  MOLST  [ ]  Living Will  [ ]   DECISION MAKER(s):  [ ] Health Care Proxy(s)  [ x] Surrogate(s)  [ ] Guardian           Name(s): Phone Number(s):  Sofia Bridge -  spouse      BASELINE (I)ADL(s) (prior to admission):    Green: [ ]Total  [ ] Moderate [ ]Dependent  Palliative Performance Status Version 2:         %    http://npcrc.org/files/news/palliative_performance_scale_ppsv2.pdf    Allergies    No Known Allergies    Intolerances    MEDICATIONS  (STANDING):  albuterol/ipratropium for Nebulization 3 milliLiter(s) Nebulizer every 6 hours  aspirin enteric coated 81 milliGRAM(s) Oral daily  atorvastatin 80 milliGRAM(s) Oral at bedtime  chlorhexidine 2% Cloths 1 Application(s) Topical <User Schedule>  clopidogrel Tablet 75 milliGRAM(s) Oral daily  escitalopram 20 milliGRAM(s) Oral daily  fludroCORTISONE 0.1 milliGRAM(s) Oral daily  furosemide   Injectable 40 milliGRAM(s) IV Push daily  heparin   Injectable 5000 Unit(s) SubCutaneous every 8 hours  mirtazapine 15 milliGRAM(s) Oral at bedtime  pantoprazole    Tablet 40 milliGRAM(s) Oral before breakfast  spironolactone 25 milliGRAM(s) Oral daily  tamsulosin 0.4 milliGRAM(s) Oral at bedtime    MEDICATIONS  (PRN):  acetaminophen     Tablet .. 650 milliGRAM(s) Oral every 6 hours PRN Temp greater or equal to 38C (100.4F), Mild Pain (1 - 3)  albuterol    90 MICROgram(s) HFA Inhaler 2 Puff(s) Inhalation every 4 hours PRN Shortness of Breath and/or Wheezing  aluminum hydroxide/magnesium hydroxide/simethicone Suspension 30 milliLiter(s) Oral every 4 hours PRN Dyspepsia  ondansetron Injectable 4 milliGRAM(s) IV Push every 8 hours PRN Nausea and/or Vomiting      PRESENT SYMPTOMS: [x ]Unable to obtain due to poor mentation   Source if other than patient:  [ ]Family   [ ]Team     Pain: [ ]yes [ ]no  ALL PAIN ASSESSMENT COMPONENTS WERE ASKED ABOUT UNLESS OTHERWISE NOTED  QOL impact -   Location -                    Aggravating factors -  Quality -  Radiation -  Timing-  Severity (0-10 scale):  Minimal acceptable level (0-10 scale):     CPOT:  0  https://www.[facility].org/getattachment/[email or web address]/Critical-Care-Pain-Observation-Tool-(CPOT)    PAIN AD Score:   http://geriatrictoolkit.[facility]/cog/painad.pdf (press ctrl +  left click to view)    Dyspnea:                           [ ]None[ ]Mild [ ]Moderate [ ]Severe     Respiratory Distress Observation Scale (RDOS): 0  A score of 0 to 2 signifies little or no respiratory distress, 3 signifies mild distress, scores 4 to 6 indicate moderate distress, and scores greater than 7 signify severe distress  https://[email or web address].ca/sites/default/files/PDFS/477653-mpiwgnrkyne-itvsgczx-xhfiuwdxpho-mdlly.pdf    Anxiety:                             [ ]None[ ]Mild [ ]Moderate [ ]Severe   Fatigue:                             [ ]None[ ]Mild [ ]Moderate [ ]Severe   Nausea:                             [ x]None[ ]Mild [ ]Moderate [ ]Severe   Loss of appetite:              [ ]None[ ]Mild [ ]Moderate [ ]Severe   Constipation:                    [ ]None[ ]Mild [ ]Moderate [ ]Severe    Other Symptoms:  [x ]All other review of systems negative     Palliative Performance Status Version 2:         20%    http://[facility].org/files/news/palliative_performance_scale_ppsv2.pdf    PHYSICAL EXAM:  Vital Signs Last 24 Hrs  T(C): 36.7 (05 Jun 2025 13:37), Max: 36.7 (05 Jun 2025 13:37)  T(F): 98.1 (05 Jun 2025 13:37), Max: 98.1 (05 Jun 2025 13:37)  HR: 85 (05 Jun 2025 13:37) (85 - 98)  BP: 119/64 (05 Jun 2025 13:37) (114/76 - 119/64)  BP(mean): --  RR: 18 (05 Jun 2025 13:37) (18 - 18)  SpO2: 97% (05 Jun 2025 13:37) (97% - 97%)    Parameters below as of 04 Jun 2025 21:05  Patient On (Oxygen Delivery Method): nasal cannula  O2 Flow (L/min): 4        GENERAL:  [ ] No acute distress [x ]Lethargic  [ ]Unarousable  [ ]Verbal  [ ]Non-Verbal [ ]Cachexia    BEHAVIORAL/PSYCH:  [ ]Alert and Oriented x  [ ] Anxiety [ ] Delirium [ ] Agitation [x ] Calm   EYES: [ ] No scleral icterus [ ] Scleral icterus [ ] Closed  ENMT:  [ ]Dry mouth  [ ]No external oral lesions [ ] No external ear or nose lesions  CARDIOVASCULAR:  [ ]Regular [ ]Irregular [ ]Tachy [ ]Not Tachy  [ ]Sergio [ ] Edema [ ] No edema  PULMONARY:  [ ]Tachypnea  [ ]Audible excessive secretions [ ] No labored breathing [ ] labored breathing  GASTROINTESTINAL: [ ]Soft  [ ]Distended  [ ]Not distended [ ]Non tender [ ]Tender  MUSCULOSKELETAL: [ ]No clubbing [ ] clubbing  [ ] No cyanosis [ ] cyanosis  NEUROLOGIC: [ ]No focal deficits  [ ]Follows commands  [ ]Does not follow commands  [x ]Cognitive impairment  [ ]Dysphagia  [ ]Dysarthria  [ ]Paresis   SKIN: [ ] Jaundiced [ x] Non-jaundiced [ ]Rash [ ]No Rash [ ] Warm [ ] Dry  MISC/LINES: [ ] ET tube [ ] Trach [ ]NGT/OGT [ ]PEG [ ]Sauer    LABS: Interpreted by me - BMP shows elevated creatinine, CBC shows anemia, LFTs shows low albumin and high Tibili                          7.9    7.49  )-----------( 196      ( 04 Jun 2025 07:16 )             25.3       06-04    138  |  99  |  68[HH]  ----------------------------<  77  4.5   |  21  |  3.8[H]    Ca    8.2[L]      04 Jun 2025 16:13    TPro  7.0  /  Alb  3.2[L]  /  TBili  3.4[H]  /  DBili  x   /  AST  192[H]  /  ALT  167[H]  /  AlkPhos  109  06-04              Urinalysis Basic - ( 04 Jun 2025 16:13 )    Color: x / Appearance: x / SG: x / pH: x  Gluc: 77 mg/dL / Ketone: x  / Bili: x / Urobili: x   Blood: x / Protein: x / Nitrite: x   Leuk Esterase: x / RBC: x / WBC x   Sq Epi: x / Non Sq Epi: x / Bacteria: x                  CAPILLARY BLOOD GLUCOSE                  RADIOLOGY & ADDITIONAL STUDIES: Interpreted by me    < from: Xray Chest 1 View- PORTABLE-Urgent (06.03.25 @ 19:56) >  IMPRESSION:    Stable left base opacity    < end of copied text >        EKG: Interpreted by me    < from: 12 Lead ECG (06.03.25 @ 19:39) >  Ventricular Rate 90 BPM    Atrial Rate 288 BPM    QRS Duration 130 ms    Q-T Interval 348 ms    QTC Calculation(Bazett) 425 ms    R Axis -42 degrees    T Axis 32 degrees    Diagnosis Line    Undetermined rhythm  *** Poor data quality, interpretation may be adversely affected  Left axis deviation  Right bundle branch block  Abnormal ECG    < end of copied text >        PROTEIN CALORIE MALNUTRITION PRESENT: [ ]mild [ ]moderate [ ]severe [ ]underweight [ ]morbid obesity  https://www.andeal.org/vault/2440/web/files/ONC/Table_Clinical%20Characteristics%20to%20Document%20Malnutrition-White%20JV%20et%20al%202012.pdf    Height (cm): 182.9 (06-03-25 @ 23:15), 167.6 (04-25-25 @ 11:00)  Weight (kg): 96.9 (06-03-25 @ 23:15), 68 (04-25-25 @ 11:00), 80 (12-27-24 @ 04:45)  BMI (kg/m2): 29 (06-03-25 @ 23:15), 24.2 (04-25-25 @ 11:00)  [ ]PPSV2 < or = to 30% [ ]significant weight loss  [ ]poor nutritional intake  [ ]anasarca      [ ]Artificial Nutrition      Palliative Care Spiritual/Emotional Screening Tool Question  Severity (0-4):                    OR                    [ x] Unable to determine. Will assess at later time if appropriate.  Score of 2 or greater indicates recommendation of Chaplaincy and/or SW referral  Chaplaincy Referral: [ ] Yes [ ] Refused [ ] Following     Caregiver [place]:  [ ] Yes [ ] No    OR    [x ] Unable to determine. Will assess at later time if appropriate.  Social Work Referral [ ]  Patient and Family Centered Care Referral [ ]    Anticipatory Grief Present: [ ] Yes [ ] No    OR     [ x] Unable to determine. Will assess at later time if appropriate.  Social Work Referral [ ]  Patient and Family Centered Care Referral [ ]    Patient discussed with primary medical team MD  Palliative care education provided to patient and/or family

## 2025-06-07 NOTE — CHART NOTE - NSCHARTNOTEFT_GEN_A_CORE
pt was seen in bed alert, comfortable,  NAD  Vital Signs Last 24 Hrs  T(C): 36.5 (07 Jun 2025 04:45), Max: 36.5 (07 Jun 2025 04:45)  T(F): 97.7 (07 Jun 2025 04:45), Max: 97.7 (07 Jun 2025 04:45)  HR: 65 (07 Jun 2025 04:45) (65 - 68)  BP: 111/71 (07 Jun 2025 04:45) (111/71 - 115/69)  RR: 18 (07 Jun 2025 04:45) (18 - 18)  SpO2: 95% (07 Jun 2025 04:45) (94% - 95%)  Parameters below as of 07 Jun 2025 04:45  Patient On (Oxygen Delivery Method): nasal cannula  O2 Flow (L/min): 4    A/P:  continue current tx  hospice care  monitor vss  monitor pt

## 2025-06-08 RX ADMIN — CLOPIDOGREL BISULFATE 75 MILLIGRAM(S): 75 TABLET, FILM COATED ORAL at 11:22

## 2025-06-08 RX ADMIN — TAMSULOSIN HYDROCHLORIDE 0.4 MILLIGRAM(S): 0.4 CAPSULE ORAL at 21:12

## 2025-06-08 RX ADMIN — IPRATROPIUM BROMIDE AND ALBUTEROL SULFATE 3 MILLILITER(S): .5; 2.5 SOLUTION RESPIRATORY (INHALATION) at 14:22

## 2025-06-08 RX ADMIN — Medication 1 APPLICATION(S): at 05:15

## 2025-06-08 RX ADMIN — ESCITALOPRAM OXALATE 20 MILLIGRAM(S): 20 TABLET ORAL at 11:21

## 2025-06-08 RX ADMIN — HEPARIN SODIUM 5000 UNIT(S): 1000 INJECTION INTRAVENOUS; SUBCUTANEOUS at 21:12

## 2025-06-08 RX ADMIN — MIRTAZAPINE 15 MILLIGRAM(S): 30 TABLET, FILM COATED ORAL at 21:12

## 2025-06-08 RX ADMIN — ATORVASTATIN CALCIUM 80 MILLIGRAM(S): 80 TABLET, FILM COATED ORAL at 21:12

## 2025-06-08 RX ADMIN — IPRATROPIUM BROMIDE AND ALBUTEROL SULFATE 3 MILLILITER(S): .5; 2.5 SOLUTION RESPIRATORY (INHALATION) at 01:58

## 2025-06-08 RX ADMIN — IPRATROPIUM BROMIDE AND ALBUTEROL SULFATE 3 MILLILITER(S): .5; 2.5 SOLUTION RESPIRATORY (INHALATION) at 07:38

## 2025-06-08 RX ADMIN — Medication 81 MILLIGRAM(S): at 11:21

## 2025-06-08 RX ADMIN — Medication 25 MILLIGRAM(S): at 05:15

## 2025-06-08 RX ADMIN — Medication 0.1 MILLIGRAM(S): at 05:15

## 2025-06-08 RX ADMIN — FUROSEMIDE 40 MILLIGRAM(S): 10 INJECTION INTRAMUSCULAR; INTRAVENOUS at 05:15

## 2025-06-08 RX ADMIN — IPRATROPIUM BROMIDE AND ALBUTEROL SULFATE 3 MILLILITER(S): .5; 2.5 SOLUTION RESPIRATORY (INHALATION) at 20:28

## 2025-06-08 RX ADMIN — HEPARIN SODIUM 5000 UNIT(S): 1000 INJECTION INTRAVENOUS; SUBCUTANEOUS at 05:15

## 2025-06-08 RX ADMIN — HEPARIN SODIUM 5000 UNIT(S): 1000 INJECTION INTRAVENOUS; SUBCUTANEOUS at 13:12

## 2025-06-08 RX ADMIN — Medication 40 MILLIGRAM(S): at 05:15

## 2025-06-08 NOTE — DISCHARGE NOTE PROVIDER - PROVIDER TOKENS
PROVIDER:[TOKEN:[[id number]],FOLLOWUP:[1 week]] PROVIDER:[TOKEN:[[id number]],SCHEDULEDAPPT:[06/12/2025],SCHEDULEDAPPTTIME:[04:00 PM]]

## 2025-06-08 NOTE — DISCHARGE NOTE PROVIDER - HOSPITAL COURSE
83 yo M with a pmhx of TBI, speech impairment, ex-smoker quit 50 years ago, HTN, HLD, GERD, anemia, splenectomy, skin CA s/p excision and radiation, Hep C (treated), HFrEF, MR/TR/AS, pulmonary htn, orthostatic hypotension, thoracic and lumbar  compression fracture, and CVA  presented to the ED complaining of 3 weeks of leg swelling,    # Pulmonary edema/chronic, no acute chf  #Acute on chronic renal failure,   # HFrEF  - troponin elevated - will not follow  hospice consult appreciated     # Anemia  will not follow    PLAN - hospice care 85 yo M with a pmhx of TBI, speech impairment, ex-smoker quit 50 years ago, HTN, HLD, GERD, anemia, splenectomy, skin CA s/p excision and radiation, Hep C (treated), HFrEF, MR/TR/AS, pulmonary htn, orthostatic hypotension, thoracic and lumbar  compression fracture, and CVA  presented to the ED complaining of 3 weeks of leg swelling,    # Pulmonary edema/chronic, no acute chf  #Acute on chronic renal failure,   # HFrEF  - troponin elevated - will not follow  hospice consult appreciated     # Anemia  will not follow    PLAN - hospice care         Patient examined on day of discharge and found to be medically stable for discharge by Dr. Enriquez to home w/ hospice with outpatient follow up.                    Vital Signs Last 24 Hrs  T(C): 35.8 (09 Jun 2025 12:56), Max: 36.6 (08 Jun 2025 20:30)  T(F): 96.5 (09 Jun 2025 12:56), Max: 97.9 (09 Jun 2025 04:38)  HR: 72 (09 Jun 2025 12:56) (63 - 108)  BP: 116/59 (09 Jun 2025 12:56) (101/61 - 116/59)  BP(mean): --  RR: 18 (09 Jun 2025 12:56) (18 - 18)  SpO2: 91% (09 Jun 2025 12:56) (91% - 99%)    Parameters below as of 09 Jun 2025 12:56  Patient On (Oxygen Delivery Method): nasal cannula  O2 Flow (L/min): 4       83 yo M with a pmhx of TBI, speech impairment, ex-smoker quit 50 years ago, HTN, HLD, GERD, anemia, splenectomy, skin CA s/p excision and radiation, Hep C (treated), HFrEF, MR/TR/AS, pulmonary htn, orthostatic hypotension, thoracic and lumbar  compression fracture, and CVA  presented to the ED complaining of 3 weeks of leg swelling,    # Pulmonary edema/chronic, no acute chf  #Acute on chronic renal failure,   # HFrEF  - troponin elevated - will not follow  hospice consult appreciated     # Anemia  will not follow    PLAN - hospice care         Patient examined on day of discharge and found to be medically stable for discharge by Dr. Enriquez to home w/ hospice with outpatient follow up.    med rec d/w Dr. Enriquez and completed accordingly                Vital Signs Last 24 Hrs  T(C): 35.8 (09 Jun 2025 12:56), Max: 36.6 (08 Jun 2025 20:30)  T(F): 96.5 (09 Jun 2025 12:56), Max: 97.9 (09 Jun 2025 04:38)  HR: 72 (09 Jun 2025 12:56) (63 - 108)  BP: 116/59 (09 Jun 2025 12:56) (101/61 - 116/59)  BP(mean): --  RR: 18 (09 Jun 2025 12:56) (18 - 18)  SpO2: 91% (09 Jun 2025 12:56) (91% - 99%)    Parameters below as of 09 Jun 2025 12:56  Patient On (Oxygen Delivery Method): nasal cannula  O2 Flow (L/min): 4

## 2025-06-08 NOTE — DISCHARGE NOTE PROVIDER - NSDCCPCAREPLAN_GEN_ALL_CORE_FT
PRINCIPAL DISCHARGE DIAGNOSIS  Diagnosis: Pulmonary edema  Assessment and Plan of Treatment: Hospice care. Conitnue medications      SECONDARY DISCHARGE DIAGNOSES  Diagnosis: Acute on chronic renal failure  Assessment and Plan of Treatment: hospice care

## 2025-06-08 NOTE — DISCHARGE NOTE PROVIDER - NSDCMRMEDTOKEN_GEN_ALL_CORE_FT
Aspirin Low Dose 81 mg oral delayed release tablet: 1 tab(s) orally once a day  clopidogrel 75 mg oral tablet: 1 tab(s) orally once a day  Flomax 0.4 mg oral capsule: 1 cap(s) orally once a day (at bedtime)  Florinef Acetate 0.1 mg oral tablet: 1 tab(s) orally once a day  Lexapro 20 mg oral tablet: 1 tab(s) orally once a day  Lipitor 80 mg oral tablet: 1 tab(s) orally once a day (at bedtime)  mirtazapine 15 mg oral tablet: 1 tab(s) orally once a day (at bedtime)  pantoprazole 40 mg oral delayed release tablet: 1 tab(s) orally once a day  spironolactone 25 mg oral tablet: 1 tab(s) orally once a day  torsemide 20 mg oral tablet: 1 tab(s) orally once a day   albuterol 90 mcg/inh inhalation aerosol: 2 puff(s) inhaled every 6 hours as needed for Shortness of Breath and/or Wheezing  Aspirin Low Dose 81 mg oral delayed release tablet: 1 tab(s) orally once a day  clopidogrel 75 mg oral tablet: 1 tab(s) orally once a day  Flomax 0.4 mg oral capsule: 1 cap(s) orally once a day (at bedtime)  Florinef Acetate 0.1 mg oral tablet: 1 tab(s) orally once a day  Lexapro 20 mg oral tablet: 1 tab(s) orally once a day  Lipitor 80 mg oral tablet: 1 tab(s) orally once a day (at bedtime)  mirtazapine 15 mg oral tablet: 1 tab(s) orally once a day (at bedtime)  pantoprazole 40 mg oral delayed release tablet: 1 tab(s) orally once a day  spironolactone 25 mg oral tablet: 1 tab(s) orally once a day  torsemide 20 mg oral tablet: 1 tab(s) orally once a day

## 2025-06-08 NOTE — DISCHARGE NOTE PROVIDER - NSDCFUADDAPPT_GEN_ALL_CORE_FT
** You have an appointment w/ Dr. Enriquez in office scheduled on 6/12/25 at 4pm for post-hospitalization follow-up**

## 2025-06-08 NOTE — DISCHARGE NOTE PROVIDER - CARE PROVIDER_API CALL
Dr. Lindsay, please advise   Deion Enriquez  Internal Medicine  [address] [place] [address]  Phone: [phone]  Fax: [phone]  Follow Up Time: 1 week   Deion Enriquez  Internal Medicine  [address] [place] [address]  Phone: [phone]  Fax: [phone]  Scheduled Appointment: 06/12/2025 04:00 PM

## 2025-06-08 NOTE — DISCHARGE NOTE PROVIDER - CARE PROVIDERS DIRECT ADDRESSES
I will call you with the lab results.  Continue to offer plenty of fluids for hydration.  Follow up in 1 week, if symptoms fail to improve.  Follow up sooner for worsening symptoms or fever lasting longer than 3 days.  
,DirectAddress_Unknown

## 2025-06-09 ENCOUNTER — TRANSCRIPTION ENCOUNTER (OUTPATIENT)
Age: 85
End: 2025-06-09

## 2025-06-09 VITALS
SYSTOLIC BLOOD PRESSURE: 116 MMHG | TEMPERATURE: 96 F | HEART RATE: 72 BPM | OXYGEN SATURATION: 91 % | RESPIRATION RATE: 18 BRPM | DIASTOLIC BLOOD PRESSURE: 59 MMHG

## 2025-06-09 RX ORDER — ALBUTEROL SULFATE 2.5 MG/3ML
2 VIAL, NEBULIZER (ML) INHALATION
Qty: 0 | Refills: 0 | DISCHARGE
Start: 2025-06-09

## 2025-06-09 RX ADMIN — IPRATROPIUM BROMIDE AND ALBUTEROL SULFATE 3 MILLILITER(S): .5; 2.5 SOLUTION RESPIRATORY (INHALATION) at 07:52

## 2025-06-09 RX ADMIN — Medication 1 APPLICATION(S): at 05:15

## 2025-06-09 RX ADMIN — CLOPIDOGREL BISULFATE 75 MILLIGRAM(S): 75 TABLET, FILM COATED ORAL at 11:57

## 2025-06-09 RX ADMIN — Medication 81 MILLIGRAM(S): at 11:57

## 2025-06-09 RX ADMIN — IPRATROPIUM BROMIDE AND ALBUTEROL SULFATE 3 MILLILITER(S): .5; 2.5 SOLUTION RESPIRATORY (INHALATION) at 05:55

## 2025-06-09 RX ADMIN — Medication 0.1 MILLIGRAM(S): at 05:15

## 2025-06-09 RX ADMIN — HEPARIN SODIUM 5000 UNIT(S): 1000 INJECTION INTRAVENOUS; SUBCUTANEOUS at 05:15

## 2025-06-09 RX ADMIN — ESCITALOPRAM OXALATE 20 MILLIGRAM(S): 20 TABLET ORAL at 11:57

## 2025-06-09 RX ADMIN — IPRATROPIUM BROMIDE AND ALBUTEROL SULFATE 3 MILLILITER(S): .5; 2.5 SOLUTION RESPIRATORY (INHALATION) at 13:57

## 2025-06-09 RX ADMIN — Medication 40 MILLIGRAM(S): at 05:15

## 2025-06-09 RX ADMIN — FUROSEMIDE 40 MILLIGRAM(S): 10 INJECTION INTRAMUSCULAR; INTRAVENOUS at 05:15

## 2025-06-09 RX ADMIN — Medication 25 MILLIGRAM(S): at 05:15

## 2025-06-09 NOTE — DISCHARGE NOTE NURSING/CASE MANAGEMENT/SOCIAL WORK - NSDCPEFALRISK_GEN_ALL_CORE
For information on Fall & Injury Prevention, visit: [email or web address] OR  [email or web address] OR  https://www.cdc.gov/steadi/patient.html

## 2025-06-09 NOTE — DISCHARGE NOTE NURSING/CASE MANAGEMENT/SOCIAL WORK - PATIENT PORTAL LINK FT
You can access the FollowMyHealth Patient Portal offered by [facility] by registering at the following website: [email or web address]. By joining [employer]’s FollowMyHealth portal, you will also be able to view your health information using other applications (apps) compatible with our system.

## 2025-06-09 NOTE — DISCHARGE NOTE NURSING/CASE MANAGEMENT/SOCIAL WORK - FINANCIAL ASSISTANCE
[facility] provides services at a reduced cost to those who are determined to be eligible through [facility]’s financial assistance program. Information regarding [facility]’s financial assistance program can be found by going to [email or web address] or by calling [phone].

## 2025-06-09 NOTE — DISCHARGE NOTE NURSING/CASE MANAGEMENT/SOCIAL WORK - NSDCVIVACCINE_GEN_ALL_CORE_FT
Tdap; 09-Nov-2024 20:08; Cintia Conroy (RN); [employer]; [id number] (Exp. Date: 30-May-2026); IntraMuscular; Deltoid Left.; 0.5 milliLiter(s); VIS (VIS Published: 09-May-2013, VIS Presented: 09-Nov-2024);

## 2025-06-10 ENCOUNTER — TRANSCRIPTION ENCOUNTER (OUTPATIENT)
Age: 85
End: 2025-06-10